# Patient Record
Sex: FEMALE | Race: WHITE | NOT HISPANIC OR LATINO
[De-identification: names, ages, dates, MRNs, and addresses within clinical notes are randomized per-mention and may not be internally consistent; named-entity substitution may affect disease eponyms.]

---

## 2017-03-28 ENCOUNTER — RECORD ABSTRACTING (OUTPATIENT)
Age: 22
End: 2017-03-28

## 2017-03-28 DIAGNOSIS — Z30.09 ENCOUNTER FOR OTHER GENERAL COUNSELING AND ADVICE ON CONTRACEPTION: ICD-10-CM

## 2017-03-28 DIAGNOSIS — Z82.49 FAMILY HISTORY OF ISCHEMIC HEART DISEASE AND OTHER DISEASES OF THE CIRCULATORY SYSTEM: ICD-10-CM

## 2017-03-28 DIAGNOSIS — J45.909 UNSPECIFIED ASTHMA, UNCOMPLICATED: ICD-10-CM

## 2017-03-28 DIAGNOSIS — N94.6 DYSMENORRHEA, UNSPECIFIED: ICD-10-CM

## 2017-03-28 DIAGNOSIS — Z30.41 ENCOUNTER FOR SURVEILLANCE OF CONTRACEPTIVE PILLS: ICD-10-CM

## 2017-03-28 DIAGNOSIS — Z71.7 HUMAN IMMUNODEFICIENCY VIRUS [HIV] COUNSELING: ICD-10-CM

## 2017-03-28 DIAGNOSIS — S62.102A FRACTURE OF UNSPECIFIED CARPAL BONE, LEFT WRIST, INITIAL ENCOUNTER FOR CLOSED FRACTURE: ICD-10-CM

## 2017-03-28 DIAGNOSIS — Z30.9 ENCOUNTER FOR CONTRACEPTIVE MANAGEMENT, UNSPECIFIED: ICD-10-CM

## 2017-03-28 DIAGNOSIS — Z78.9 OTHER SPECIFIED HEALTH STATUS: ICD-10-CM

## 2017-03-28 DIAGNOSIS — Z81.1 FAMILY HISTORY OF ALCOHOL ABUSE AND DEPENDENCE: ICD-10-CM

## 2017-03-28 DIAGNOSIS — Z80.9 FAMILY HISTORY OF MALIGNANT NEOPLASM, UNSPECIFIED: ICD-10-CM

## 2017-03-28 PROBLEM — Z00.00 ENCOUNTER FOR PREVENTIVE HEALTH EXAMINATION: Status: ACTIVE | Noted: 2017-03-28

## 2017-03-29 ENCOUNTER — APPOINTMENT (EMERGENCY)
Dept: RADIOLOGY | Facility: HOSPITAL | Age: 22
End: 2017-03-29
Payer: COMMERCIAL

## 2017-03-29 ENCOUNTER — HOSPITAL ENCOUNTER (EMERGENCY)
Facility: HOSPITAL | Age: 22
Discharge: HOME | End: 2017-03-29
Attending: EMERGENCY MEDICINE | Admitting: EMERGENCY MEDICINE
Payer: COMMERCIAL

## 2017-03-29 VITALS
BODY MASS INDEX: 20.41 KG/M2 | DIASTOLIC BLOOD PRESSURE: 71 MMHG | HEIGHT: 66 IN | WEIGHT: 127 LBS | HEART RATE: 92 BPM | TEMPERATURE: 98.8 F | RESPIRATION RATE: 18 BRPM | OXYGEN SATURATION: 100 % | SYSTOLIC BLOOD PRESSURE: 132 MMHG

## 2017-03-29 DIAGNOSIS — M54.6 THORACIC BACK PAIN: ICD-10-CM

## 2017-03-29 DIAGNOSIS — M54.50 ACUTE RIGHT-SIDED LOW BACK PAIN WITHOUT SCIATICA: Primary | ICD-10-CM

## 2017-03-29 PROCEDURE — 96372 THER/PROPH/DIAG INJ SC/IM: CPT

## 2017-03-29 PROCEDURE — 72100 X-RAY EXAM L-S SPINE 2/3 VWS: CPT

## 2017-03-29 PROCEDURE — 99283 EMERGENCY DEPT VISIT LOW MDM: CPT

## 2017-03-29 PROCEDURE — 72070 X-RAY EXAM THORAC SPINE 2VWS: CPT

## 2017-03-29 RX ORDER — IBUPROFEN 400 MG/1
400 TABLET ORAL EVERY 6 HOURS PRN
Qty: 30 TABLET | Refills: 0 | Status: SHIPPED | OUTPATIENT
Start: 2017-03-29 | End: 2017-06-05

## 2017-03-29 RX ORDER — LIDOCAINE 50 MG/G
2 PATCH TOPICAL ONCE
Status: DISCONTINUED | OUTPATIENT
Start: 2017-03-29 | End: 2017-03-29 | Stop reason: HOSPADM

## 2017-03-29 RX ORDER — METHOCARBAMOL 500 MG/1
500 TABLET, FILM COATED ORAL 3 TIMES DAILY
Qty: 30 TABLET | Refills: 0 | Status: SHIPPED | OUTPATIENT
Start: 2017-03-29 | End: 2018-03-31

## 2017-03-29 RX ORDER — METHOCARBAMOL 500 MG/1
500 TABLET, FILM COATED ORAL ONCE
Status: COMPLETED | OUTPATIENT
Start: 2017-03-29 | End: 2017-03-29

## 2017-03-29 RX ORDER — ACETAMINOPHEN 325 MG/1
975 TABLET ORAL ONCE
Status: COMPLETED | OUTPATIENT
Start: 2017-03-29 | End: 2017-03-29

## 2017-03-29 RX ORDER — KETOROLAC TROMETHAMINE 30 MG/ML
15 INJECTION, SOLUTION INTRAMUSCULAR; INTRAVENOUS ONCE
Status: COMPLETED | OUTPATIENT
Start: 2017-03-29 | End: 2017-03-29

## 2017-03-29 RX ORDER — LIDOCAINE 50 MG/G
1 PATCH TOPICAL EVERY 24 HOURS
Qty: 10 PATCH | Refills: 0 | Status: SHIPPED | OUTPATIENT
Start: 2017-03-29 | End: 2017-06-05

## 2017-03-29 RX ADMIN — KETOROLAC TROMETHAMINE 15 MG: 30 INJECTION, SOLUTION INTRAMUSCULAR at 15:36

## 2017-03-29 RX ADMIN — LIDOCAINE 2 PATCH: 50 PATCH CUTANEOUS at 15:37

## 2017-03-29 RX ADMIN — ACETAMINOPHEN 975 MG: 325 TABLET, FILM COATED ORAL at 15:36

## 2017-03-29 RX ADMIN — METHOCARBAMOL 500 MG: 500 TABLET ORAL at 15:36

## 2017-06-05 ENCOUNTER — HOSPITAL ENCOUNTER (EMERGENCY)
Facility: HOSPITAL | Age: 22
Discharge: HOME | End: 2017-06-05
Attending: EMERGENCY MEDICINE
Payer: COMMERCIAL

## 2017-06-05 ENCOUNTER — APPOINTMENT (EMERGENCY)
Dept: RADIOLOGY | Facility: HOSPITAL | Age: 22
End: 2017-06-05
Payer: COMMERCIAL

## 2017-06-05 VITALS
TEMPERATURE: 97.9 F | OXYGEN SATURATION: 99 % | BODY MASS INDEX: 20.09 KG/M2 | HEIGHT: 66 IN | DIASTOLIC BLOOD PRESSURE: 67 MMHG | WEIGHT: 125 LBS | SYSTOLIC BLOOD PRESSURE: 115 MMHG | RESPIRATION RATE: 18 BRPM | HEART RATE: 66 BPM
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
## 2017-06-05 DIAGNOSIS — R51.9 HEADACHE: ICD-10-CM

## 2017-06-05 DIAGNOSIS — M54.6 THORACOLUMBAR BACK PAIN: ICD-10-CM

## 2017-06-05 DIAGNOSIS — M54.2 NECK PAIN: ICD-10-CM

## 2017-06-05 DIAGNOSIS — R10.9 ABDOMINAL PAIN: ICD-10-CM

## 2017-06-05 DIAGNOSIS — M54.50 THORACOLUMBAR BACK PAIN: ICD-10-CM

## 2017-06-05 LAB
ANION GAP BLD CALC-SCNC: 15 MMOL/L (ref 4–13)
BILIRUB UR QL STRIP: NEGATIVE
BUN BLD-MCNC: 14 MG/DL (ref 5–25)
CA-I BLD-SCNC: 1.14 MMOL/L (ref 1.12–1.32)
CHLORIDE BLD-SCNC: 103 MMOL/L (ref 100–108)
CLARITY UR: CLEAR
COLOR UR: YELLOW
CREAT BLD-MCNC: 0.7 MG/DL (ref 0.6–1.3)
GFR SERPL CREATININE-BSD FRML MDRD: >60 ML/MIN/1.73SQ M
GLUCOSE SERPL-MCNC: 97 MG/DL (ref 65–140)
GLUCOSE UR STRIP-MCNC: NEGATIVE MG/DL
HCG UR QL: NORMAL
HCT VFR BLD CALC: 42 % (ref 34.8–46.1)
HGB BLDA-MCNC: 14.3 G/DL (ref 11.5–15.4)
HGB UR QL STRIP.AUTO: NEGATIVE
KETONES UR STRIP-MCNC: NEGATIVE MG/DL
LEUKOCYTE ESTERASE UR QL STRIP: NEGATIVE
NITRITE UR QL STRIP: NEGATIVE
PCO2 BLD: 28 MMOL/L (ref 21–32)
PH UR STRIP.AUTO: 6 [PH] (ref 4.5–8)
POTASSIUM BLD-SCNC: 4.7 MMOL/L (ref 3.5–5.3)
PROT UR STRIP-MCNC: NEGATIVE MG/DL
SODIUM BLD-SCNC: 140 MMOL/L (ref 136–145)
SP GR UR STRIP.AUTO: 1.01 (ref 1–1.03)
SPECIMEN SOURCE: ABNORMAL
UROBILINOGEN UR QL STRIP.AUTO: 0.2 E.U./DL

## 2017-06-05 PROCEDURE — 99284 EMERGENCY DEPT VISIT MOD MDM: CPT

## 2017-06-05 PROCEDURE — 72125 CT NECK SPINE W/O DYE: CPT

## 2017-06-05 PROCEDURE — 71250 CT THORAX DX C-: CPT

## 2017-06-05 PROCEDURE — 85014 HEMATOCRIT: CPT

## 2017-06-05 PROCEDURE — 96374 THER/PROPH/DIAG INJ IV PUSH: CPT

## 2017-06-05 PROCEDURE — 81025 URINE PREGNANCY TEST: CPT | Performed by: EMERGENCY MEDICINE

## 2017-06-05 PROCEDURE — 74176 CT ABD & PELVIS W/O CONTRAST: CPT

## 2017-06-05 PROCEDURE — 81003 URINALYSIS AUTO W/O SCOPE: CPT

## 2017-06-05 PROCEDURE — 70450 CT HEAD/BRAIN W/O DYE: CPT

## 2017-06-05 PROCEDURE — 80047 BASIC METABLC PNL IONIZED CA: CPT

## 2017-06-05 RX ORDER — OMEPRAZOLE 40 MG/1
40 CAPSULE, DELAYED RELEASE ORAL DAILY
COMMUNITY
End: 2020-01-07 | Stop reason: ALTCHOICE

## 2017-06-05 RX ORDER — ACETAMINOPHEN 325 MG/1
975 TABLET ORAL ONCE
Status: DISCONTINUED | OUTPATIENT
Start: 2017-06-05 | End: 2017-06-06 | Stop reason: HOSPADM

## 2017-06-05 RX ORDER — KETOROLAC TROMETHAMINE 30 MG/ML
15 INJECTION, SOLUTION INTRAMUSCULAR; INTRAVENOUS ONCE
Status: COMPLETED | OUTPATIENT
Start: 2017-06-05 | End: 2017-06-05

## 2017-06-05 RX ORDER — TIZANIDINE 4 MG/1
4 TABLET ORAL
COMMUNITY
End: 2018-03-31

## 2017-06-05 RX ORDER — OXYCODONE HYDROCHLORIDE AND ACETAMINOPHEN 5; 325 MG/1; MG/1
0.25 TABLET ORAL AS NEEDED
COMMUNITY

## 2017-06-05 RX ADMIN — KETOROLAC TROMETHAMINE 15 MG: 30 INJECTION, SOLUTION INTRAMUSCULAR at 19:09

## 2017-12-03 ENCOUNTER — EMERGENCY (EMERGENCY)
Facility: HOSPITAL | Age: 22
LOS: 0 days | Discharge: HOME | End: 2017-12-03
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
## 2017-12-03 DIAGNOSIS — M79.642 PAIN IN LEFT HAND: ICD-10-CM
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
## 2018-03-31 ENCOUNTER — APPOINTMENT (EMERGENCY)
Dept: RADIOLOGY | Facility: HOSPITAL | Age: 23
End: 2018-03-31
Payer: COMMERCIAL

## 2018-03-31 ENCOUNTER — HOSPITAL ENCOUNTER (EMERGENCY)
Facility: HOSPITAL | Age: 23
Discharge: HOME | End: 2018-04-01
Attending: EMERGENCY MEDICINE
Payer: COMMERCIAL

## 2018-03-31 DIAGNOSIS — N39.0 URINARY TRACT INFECTION: Primary | ICD-10-CM

## 2018-03-31 LAB
BACTERIA UR QL AUTO: ABNORMAL /HPF
BASOPHILS # BLD AUTO: 0.02 THOUSANDS/ΜL (ref 0–0.1)
BASOPHILS NFR BLD AUTO: 0 % (ref 0–1)
BILIRUB UR QL STRIP: NEGATIVE
CLARITY UR: CLEAR
COLOR UR: YELLOW
COLOR, POC: YELLOW
EOSINOPHIL # BLD AUTO: 0.18 THOUSAND/ΜL (ref 0–0.61)
EOSINOPHIL NFR BLD AUTO: 2 % (ref 0–6)
ERYTHROCYTE [DISTWIDTH] IN BLOOD BY AUTOMATED COUNT: 12.9 % (ref 11.6–15.1)
EXT PREG TEST URINE: NEGATIVE
GLUCOSE UR STRIP-MCNC: NEGATIVE MG/DL
HCT VFR BLD AUTO: 41.8 % (ref 34.8–46.1)
HGB BLD-MCNC: 14.5 G/DL (ref 11.5–15.4)
HGB UR QL STRIP.AUTO: ABNORMAL
HYALINE CASTS #/AREA URNS LPF: ABNORMAL /LPF
KETONES UR STRIP-MCNC: NEGATIVE MG/DL
LEUKOCYTE ESTERASE UR QL STRIP: ABNORMAL
LYMPHOCYTES # BLD AUTO: 3.34 THOUSANDS/ΜL (ref 0.6–4.47)
LYMPHOCYTES NFR BLD AUTO: 46 % (ref 14–44)
MCH RBC QN AUTO: 32 PG (ref 26.8–34.3)
MCHC RBC AUTO-ENTMCNC: 34.7 G/DL (ref 31.4–37.4)
MCV RBC AUTO: 92 FL (ref 82–98)
MONOCYTES # BLD AUTO: 0.66 THOUSAND/ΜL (ref 0.17–1.22)
MONOCYTES NFR BLD AUTO: 9 % (ref 4–12)
NEUTROPHILS # BLD AUTO: 3.17 THOUSANDS/ΜL (ref 1.85–7.62)
NEUTS SEG NFR BLD AUTO: 43 % (ref 43–75)
NITRITE UR QL STRIP: NEGATIVE
NON-SQ EPI CELLS URNS QL MICRO: ABNORMAL /HPF
NRBC BLD AUTO-RTO: 0 /100 WBCS
PH UR STRIP.AUTO: 7.5 [PH] (ref 4.5–8)
PLATELET # BLD AUTO: 189 THOUSANDS/UL (ref 149–390)
PMV BLD AUTO: 11.2 FL (ref 8.9–12.7)
PROT UR STRIP-MCNC: ABNORMAL MG/DL
RBC # BLD AUTO: 4.53 MILLION/UL (ref 3.81–5.12)
RBC #/AREA URNS AUTO: ABNORMAL /HPF
SP GR UR STRIP.AUTO: 1.01 (ref 1–1.03)
UROBILINOGEN UR QL STRIP.AUTO: 2 E.U./DL
WBC # BLD AUTO: 7.38 THOUSAND/UL (ref 4.31–10.16)
WBC #/AREA URNS AUTO: ABNORMAL /HPF

## 2018-03-31 PROCEDURE — 81002 URINALYSIS NONAUTO W/O SCOPE: CPT | Performed by: EMERGENCY MEDICINE

## 2018-03-31 PROCEDURE — 83690 ASSAY OF LIPASE: CPT | Performed by: EMERGENCY MEDICINE

## 2018-03-31 PROCEDURE — 81025 URINE PREGNANCY TEST: CPT | Performed by: EMERGENCY MEDICINE

## 2018-03-31 PROCEDURE — 81001 URINALYSIS AUTO W/SCOPE: CPT

## 2018-03-31 PROCEDURE — 80053 COMPREHEN METABOLIC PANEL: CPT | Performed by: EMERGENCY MEDICINE

## 2018-03-31 PROCEDURE — 96365 THER/PROPH/DIAG IV INF INIT: CPT

## 2018-03-31 PROCEDURE — 96375 TX/PRO/DX INJ NEW DRUG ADDON: CPT

## 2018-03-31 PROCEDURE — 85025 COMPLETE CBC W/AUTO DIFF WBC: CPT | Performed by: EMERGENCY MEDICINE

## 2018-03-31 PROCEDURE — 36415 COLL VENOUS BLD VENIPUNCTURE: CPT | Performed by: EMERGENCY MEDICINE

## 2018-03-31 PROCEDURE — 74176 CT ABD & PELVIS W/O CONTRAST: CPT

## 2018-03-31 RX ORDER — KETOROLAC TROMETHAMINE 30 MG/ML
15 INJECTION, SOLUTION INTRAMUSCULAR; INTRAVENOUS ONCE
Status: COMPLETED | OUTPATIENT
Start: 2018-03-31 | End: 2018-03-31

## 2018-03-31 RX ADMIN — KETOROLAC TROMETHAMINE 15 MG: 30 INJECTION, SOLUTION INTRAMUSCULAR at 23:38

## 2018-04-01 VITALS
SYSTOLIC BLOOD PRESSURE: 109 MMHG | OXYGEN SATURATION: 99 % | WEIGHT: 130 LBS | HEART RATE: 64 BPM | RESPIRATION RATE: 16 BRPM | BODY MASS INDEX: 20.98 KG/M2 | TEMPERATURE: 98.6 F | DIASTOLIC BLOOD PRESSURE: 61 MMHG

## 2018-04-01 LAB
ALBUMIN SERPL BCP-MCNC: 3.9 G/DL (ref 3.5–5)
ALP SERPL-CCNC: 38 U/L (ref 46–116)
ALT SERPL W P-5'-P-CCNC: 19 U/L (ref 12–78)
ANION GAP SERPL CALCULATED.3IONS-SCNC: 4 MMOL/L (ref 4–13)
AST SERPL W P-5'-P-CCNC: 16 U/L (ref 5–45)
BILIRUB SERPL-MCNC: 0.46 MG/DL (ref 0.2–1)
BUN SERPL-MCNC: 11 MG/DL (ref 5–25)
CALCIUM SERPL-MCNC: 8.6 MG/DL (ref 8.3–10.1)
CHLORIDE SERPL-SCNC: 108 MMOL/L (ref 100–108)
CO2 SERPL-SCNC: 28 MMOL/L (ref 21–32)
CREAT SERPL-MCNC: 1.03 MG/DL (ref 0.6–1.3)
GFR SERPL CREATININE-BSD FRML MDRD: 77 ML/MIN/1.73SQ M
GLUCOSE SERPL-MCNC: 86 MG/DL (ref 65–140)
LIPASE SERPL-CCNC: 81 U/L (ref 73–393)
POTASSIUM SERPL-SCNC: 3.6 MMOL/L (ref 3.5–5.3)
PROT SERPL-MCNC: 7.1 G/DL (ref 6.4–8.2)
SODIUM SERPL-SCNC: 140 MMOL/L (ref 136–145)

## 2018-04-01 PROCEDURE — 99284 EMERGENCY DEPT VISIT MOD MDM: CPT

## 2018-04-01 RX ORDER — NAPROXEN 375 MG/1
375 TABLET ORAL 2 TIMES DAILY WITH MEALS
Qty: 20 TABLET | Refills: 0 | Status: SHIPPED | OUTPATIENT
Start: 2018-04-01 | End: 2018-10-16

## 2018-04-01 RX ORDER — NAPROXEN 375 MG/1
375 TABLET ORAL 2 TIMES DAILY WITH MEALS
Qty: 20 TABLET | Refills: 0 | Status: SHIPPED | OUTPATIENT
Start: 2018-04-01 | End: 2018-04-01

## 2018-04-01 RX ORDER — CEPHALEXIN 500 MG/1
500 CAPSULE ORAL EVERY 8 HOURS SCHEDULED
Qty: 21 CAPSULE | Refills: 0 | Status: SHIPPED | OUTPATIENT
Start: 2018-04-01 | End: 2018-04-01

## 2018-04-01 RX ORDER — CEPHALEXIN 500 MG/1
500 CAPSULE ORAL EVERY 8 HOURS SCHEDULED
Qty: 21 CAPSULE | Refills: 0 | Status: SHIPPED | OUTPATIENT
Start: 2018-04-01 | End: 2018-04-08

## 2018-04-01 RX ADMIN — CEFTRIAXONE 1000 MG: 1 INJECTION, SOLUTION INTRAVENOUS at 01:05

## 2018-04-01 NOTE — ED ATTENDING ATTESTATION
I, [address] [place] [address], DO, saw and evaluated the patient  I have discussed the patient with the resident/non-physician practitioner and agree with the resident's/non-physician practitioner's findings, Plan of Care, and MDM as documented in the resident's/non-physician practitioner's note, except where noted  All available labs and Radiology studies were reviewed  At this point I agree with the current assessment done in the Emergency Department  I have conducted an independent evaluation of this patient a history and physical is as follows:    21yo female presents with abd pain  Pt states it started suddenly while at work  Pain is in lower left side of abdomen and into her back  Denies dysuria and denies blood in her urine  No fevers  On exam - nad, heart reg, lungs clear, abd soft with tenderness left lower abd and left CVA tenderness    Plan - check urine, labs, CT    Critical Care Time  CritCare Time    Procedures

## 2018-04-01 NOTE — DISCHARGE INSTRUCTIONS

## 2018-04-01 NOTE — ED PROVIDER NOTES
History  Chief Complaint   Patient presents with    Abdominal Pain     Pt has lower left sided abdominal pain that radiates to her back  Pt states its 'burning my spine " Pt denies NVD  HPI     26year old female p/w LLQ and flank pain  Started 10 hours ago  Worsening  Aching  Radiates to flank and whole left side of abdomen  denies assoc dysuria, hematuria, cp, sob  No vag bleeding or discharge  No pmhx  A/P: left sided pain, cva tenderness on exam  Will check ct for stsone, urine for infection  Labs  Prior to Admission Medications   Prescriptions Last Dose Informant Patient Reported? Taking? Norgestrel-Ethinyl Estradiol (CRYSELLE-28 PO)   Yes Yes   Sig: Take 1 tablet by mouth daily  etanercept (ENBREL) 50 mg/mL SOSY   Yes Yes   Sig: Inject under the skin once a week   omeprazole (PriLOSEC) 40 MG capsule   Yes No   Sig: Take 40 mg by mouth daily   oxyCODONE-acetaminophen (PERCOCET) 5-325 mg per tablet   Yes Yes   Sig: Take 1 tablet by mouth every 4 (four) hours as needed for moderate pain      Facility-Administered Medications: None       Past Medical History:   Diagnosis Date    GERD (gastroesophageal reflux disease)     Irritable bowel syndrome     Psoriasis        History reviewed  No pertinent surgical history  History reviewed  No pertinent family history  I have reviewed and agree with the history as documented  Social History   Substance Use Topics    Smoking status: Current Every Day Smoker     Packs/day: 0 25     Types: Cigarettes    Smokeless tobacco: Never Used    Alcohol use No        Review of Systems   Constitutional: Negative for diaphoresis, fatigue and fever  HENT: Negative for facial swelling and nosebleeds  Eyes: Negative for pain and visual disturbance  Respiratory: Negative for apnea, cough, shortness of breath and wheezing  Cardiovascular: Negative for chest pain and leg swelling     Gastrointestinal: Negative for abdominal distention, abdominal pain, anal bleeding, blood in stool, nausea, rectal pain and vomiting  Genitourinary: Negative for difficulty urinating, dysuria and flank pain  Musculoskeletal: Negative for back pain, neck pain and neck stiffness  Neurological: Negative for dizziness, syncope, weakness, light-headedness and headaches  All other systems reviewed and are negative  Physical Exam  ED Triage Vitals   Temperature Pulse Respirations Blood Pressure SpO2   04/01/18 0115 03/31/18 2235 03/31/18 2235 03/31/18 2235 03/31/18 2235   98 6 °F (37 °C) 100 18 147/94 100 %      Temp Source Heart Rate Source Patient Position - Orthostatic VS BP Location FiO2 (%)   04/01/18 0115 -- -- -- --   Oral          Pain Score       03/31/18 2235       9           Orthostatic Vital Signs  Vitals:    03/31/18 2235 04/01/18 0115   BP: 147/94 109/61   Pulse: 100 64       Physical Exam   Constitutional: She is oriented to person, place, and time  She appears well-developed and well-nourished  No distress  HENT:   Head: Normocephalic and atraumatic  Nose: Nose normal    Eyes: Conjunctivae and EOM are normal  Pupils are equal, round, and reactive to light  No scleral icterus  Neck: Normal range of motion  Neck supple  No JVD present  No tracheal deviation present  No thyromegaly present  Cardiovascular: Normal rate, regular rhythm, normal heart sounds and intact distal pulses  Exam reveals no gallop and no friction rub  Pulmonary/Chest: Effort normal and breath sounds normal  No respiratory distress  She has no wheezes  She has no rales  She exhibits no tenderness  Abdominal: Soft  Bowel sounds are normal  She exhibits no distension and no mass  There is tenderness (left cva, llq )  There is no rebound and no guarding  No hernia  Musculoskeletal: Normal range of motion  She exhibits no edema, tenderness or deformity  Neurological: She is alert and oriented to person, place, and time  She has normal reflexes  No cranial nerve deficit  Coordination normal    Skin: Skin is warm and dry  She is not diaphoretic  No erythema  Psychiatric: She has a normal mood and affect  Her behavior is normal    Nursing note and vitals reviewed  ED Medications  Medications   ketorolac (TORADOL) injection 15 mg (15 mg Intravenous Given 3/31/18 2338)   cefTRIAXone (ROCEPHIN) IVPB (premix) 1,000 mg (0 mg Intravenous Stopped 4/1/18 0135)       Diagnostic Studies  Results Reviewed     Procedure Component Value Units Date/Time    Comprehensive metabolic panel [[id number]]  (Abnormal) Collected:  03/31/18 2339    Lab Status:  Final result Specimen:  Blood from Arm, Left Updated:  04/01/18 0011     Sodium 140 mmol/L      Potassium 3 6 mmol/L      Chloride 108 mmol/L      CO2 28 mmol/L      Anion Gap 4 mmol/L      BUN 11 mg/dL      Creatinine 1 03 mg/dL      Glucose 86 mg/dL      Calcium 8 6 mg/dL      AST 16 U/L      ALT 19 U/L      Alkaline Phosphatase 38 (L) U/L      Total Protein 7 1 g/dL      Albumin 3 9 g/dL      Total Bilirubin 0 46 mg/dL      eGFR 77 ml/min/1 73sq m     Narrative:         National Kidney Disease Education Program recommendations are as follows:  GFR calculation is accurate only with a steady state creatinine  Chronic Kidney disease less than 60 ml/min/1 73 sq  meters  Kidney failure less than 15 ml/min/1 73 sq  meters      Lipase [[id number]]  (Normal) Collected:  03/31/18 2339    Lab Status:  Final result Specimen:  Blood from Arm, Left Updated:  04/01/18 0011     Lipase 81 u/L     Urine Microscopic [[id number]]  (Abnormal) Collected:  03/31/18 2349    Lab Status:  Final result Specimen:  Urine from Urine, Clean Catch Updated:  03/31/18 2359     RBC, UA None Seen /hpf      WBC, UA 4-10 (A) /hpf      Epithelial Cells None Seen /hpf      Bacteria, UA Occasional /hpf      Hyaline Casts, UA None Seen /lpf     CBC and differential [[id number]]  (Abnormal) Collected:  03/31/18 2339    Lab Status:  Final result Specimen:  Blood from Arm, Left Updated: 03/31/18 2358     WBC 7 38 Thousand/uL      RBC 4 53 Million/uL      Hemoglobin 14 5 g/dL      Hematocrit 41 8 %      MCV 92 fL      MCH 32 0 pg      MCHC 34 7 g/dL      RDW 12 9 %      MPV 11 2 fL      Platelets 057 Thousands/uL      nRBC 0 /100 WBCs      Neutrophils Relative 43 %      Lymphocytes Relative 46 (H) %      Monocytes Relative 9 %      Eosinophils Relative 2 %      Basophils Relative 0 %      Neutrophils Absolute 3 17 Thousands/µL      Lymphocytes Absolute 3 34 Thousands/µL      Monocytes Absolute 0 66 Thousand/µL      Eosinophils Absolute 0 18 Thousand/µL      Basophils Absolute 0 02 Thousands/µL     POCT pregnancy, urine [[id number]]  (Normal) Resulted:  03/31/18 2349    Lab Status:  Final result Updated:  03/31/18 2350     EXT PREG TEST UR (Ref: Negative) negative    POCT urinalysis dipstick [[id number]]  (Normal) Resulted:  03/31/18 2349    Lab Status:  Final result Specimen:  Urine Updated:  03/31/18 2349     Color, UA yellow    ED Urine Macroscopic [[id number]]  (Abnormal) Collected:  03/31/18 2349    Lab Status:  Final result Specimen:  Urine Updated:  03/31/18 2348     Color, UA Yellow     Clarity, UA Clear     pH, UA 7 5     Leukocytes, UA Trace (A)     Nitrite, UA Negative     Protein, UA 30 (1+) (A) mg/dl      Glucose, UA Negative mg/dl      Ketones, UA Negative mg/dl      Urobilinogen, UA 2 0 (A) E U /dl      Bilirubin, UA Negative     Blood, UA Moderate (A)     Specific [place], UA 1 015    Narrative:       CLINITEK RESULT                 CT abdomen pelvis wo contrast   Final Result by Gayathri Bartholomew MD (04/01 0035)         No renal stones  Normal appendix  No bowel obstruction  Consider contrast examining the appropriate clinical setting              Workstation performed: [id number]               Procedures  Procedures      Phone Consults  ED Phone Contact    ED Course  ED Course                                MDM  Number of Diagnoses or Management Options  Urinary tract infection: new and requires workup  Diagnosis management comments: Ct negative, patient feels better, will tx empirically for uti  Amount and/or Complexity of Data Reviewed  Clinical lab tests: reviewed and ordered  Tests in the radiology section of CPT®: reviewed and ordered  Tests in the medicine section of CPT®: reviewed and ordered      CritCare Time    Disposition  Final diagnoses:   Urinary tract infection     Time reflects when diagnosis was documented in both MDM as applicable and the Disposition within this note     Time User Action Codes Description Comment    4/1/2018  1:39 AM Ricarda Silva Add [N39 0] Urinary tract infection       ED Disposition     ED Disposition Condition Comment    Discharge  Lorenza Teresa discharge to home/self care  Condition at discharge: Good        Follow-up Information     Follow up With Specialties Details Why [address] [facility], DO Internal Medicine Schedule an appointment as soon as possible for a visit  [place] [id number]  [phone]          Discharge Medication List as of 4/1/2018  1:58 AM      START taking these medications    Details   cephalexin (KEFLEX) 500 mg capsule Take 1 capsule (500 mg total) by mouth every 8 (eight) hours for 7 days, Starting Sun 4/1/2018, Until Sun 4/8/2018, Print      naproxen (NAPROSYN) 375 mg tablet Take 1 tablet (375 mg total) by mouth 2 (two) times a day with meals for 20 doses, Starting Sun 4/1/2018, Until Wed 4/11/2018, Print         CONTINUE these medications which have NOT CHANGED    Details   etanercept (ENBREL) 50 mg/mL SOSY Inject under the skin once a week, Historical Med      Norgestrel-Ethinyl Estradiol (CRYSELLE-28 PO) Take 1 tablet by mouth daily  , Until Discontinued, Historical Med      oxyCODONE-acetaminophen (PERCOCET) 5-325 mg per tablet Take 1 tablet by mouth every 4 (four) hours as needed for moderate pain, Until Discontinued, Historical Med      omeprazole (PriLOSEC) 40 MG capsule Take 40 mg by mouth daily, Until Discontinued, Historical Med           No discharge procedures on file  ED Provider  Attending physically available and evaluated Gumaro Senior I managed the patient along with the ED Attending      Electronically Signed by         Stuart Leggett DO  04/03/18 9943

## 2018-05-21 ENCOUNTER — TELEPHONE (OUTPATIENT)
Dept: NEUROLOGY | Facility: CLINIC | Age: 23
End: 2018-05-21

## 2018-05-21 NOTE — TELEPHONE ENCOUNTER
Patient's provider, Dr Bon Mock called to see if we had a referral  We did so we called and spoke to Yeni in call center  She will be reaching out to patient to schedule an appointment

## 2018-10-15 ENCOUNTER — APPOINTMENT (EMERGENCY)
Dept: RADIOLOGY | Facility: HOSPITAL | Age: 23
End: 2018-10-15
Payer: COMMERCIAL

## 2018-10-15 ENCOUNTER — HOSPITAL ENCOUNTER (EMERGENCY)
Facility: HOSPITAL | Age: 23
Discharge: HOME | End: 2018-10-16
Attending: EMERGENCY MEDICINE
Payer: COMMERCIAL

## 2018-10-15 VITALS
OXYGEN SATURATION: 100 % | HEART RATE: 79 BPM | TEMPERATURE: 99.5 F | RESPIRATION RATE: 18 BRPM | BODY MASS INDEX: 20.99 KG/M2 | WEIGHT: 130.07 LBS | DIASTOLIC BLOOD PRESSURE: 72 MMHG | SYSTOLIC BLOOD PRESSURE: 119 MMHG

## 2018-10-15 DIAGNOSIS — K08.89 PAIN, DENTAL: Primary | ICD-10-CM

## 2018-10-15 DIAGNOSIS — K04.7 DENTAL ABSCESS: ICD-10-CM

## 2018-10-15 DIAGNOSIS — R50.9 FEVER: ICD-10-CM

## 2018-10-15 LAB
ANION GAP BLD CALC-SCNC: 17 MMOL/L (ref 4–13)
BUN BLD-MCNC: 9 MG/DL (ref 5–25)
CA-I BLD-SCNC: 1.17 MMOL/L (ref 1.12–1.32)
CHLORIDE BLD-SCNC: 102 MMOL/L (ref 100–108)
CREAT BLD-MCNC: 0.8 MG/DL (ref 0.6–1.3)
EXT PREG TEST URINE: NEGATIVE
GFR SERPL CREATININE-BSD FRML MDRD: 105 ML/MIN/1.73SQ M
GLUCOSE SERPL-MCNC: 87 MG/DL (ref 65–140)
HCT VFR BLD CALC: 47 % (ref 34.8–46.1)
HGB BLDA-MCNC: 16 G/DL (ref 11.5–15.4)
PCO2 BLD: 26 MMOL/L (ref 21–32)
POTASSIUM BLD-SCNC: 3.4 MMOL/L (ref 3.5–5.3)
SODIUM BLD-SCNC: 140 MMOL/L (ref 136–145)
SPECIMEN SOURCE: ABNORMAL

## 2018-10-15 PROCEDURE — 70491 CT SOFT TISSUE NECK W/DYE: CPT

## 2018-10-15 PROCEDURE — 96374 THER/PROPH/DIAG INJ IV PUSH: CPT

## 2018-10-15 PROCEDURE — 85014 HEMATOCRIT: CPT

## 2018-10-15 PROCEDURE — 99284 EMERGENCY DEPT VISIT MOD MDM: CPT

## 2018-10-15 PROCEDURE — 80047 BASIC METABLC PNL IONIZED CA: CPT

## 2018-10-15 PROCEDURE — 81025 URINE PREGNANCY TEST: CPT | Performed by: EMERGENCY MEDICINE

## 2018-10-15 PROCEDURE — 96375 TX/PRO/DX INJ NEW DRUG ADDON: CPT

## 2018-10-15 RX ORDER — HYDROMORPHONE HCL/PF 1 MG/ML
0.5 SYRINGE (ML) INJECTION ONCE
Status: COMPLETED | OUTPATIENT
Start: 2018-10-15 | End: 2018-10-15

## 2018-10-15 RX ORDER — AMOXICILLIN AND CLAVULANATE POTASSIUM 875; 125 MG/1; MG/1
1 TABLET, FILM COATED ORAL EVERY 12 HOURS
Qty: 14 TABLET | Refills: 0 | Status: SHIPPED | OUTPATIENT
Start: 2018-10-15 | End: 2018-10-22

## 2018-10-15 RX ORDER — AMOXICILLIN AND CLAVULANATE POTASSIUM 875; 125 MG/1; MG/1
1 TABLET, FILM COATED ORAL ONCE
Status: COMPLETED | OUTPATIENT
Start: 2018-10-16 | End: 2018-10-16

## 2018-10-15 RX ORDER — METHYLPREDNISOLONE SODIUM SUCCINATE 125 MG/2ML
125 INJECTION, POWDER, LYOPHILIZED, FOR SOLUTION INTRAMUSCULAR; INTRAVENOUS ONCE
Status: COMPLETED | OUTPATIENT
Start: 2018-10-15 | End: 2018-10-15

## 2018-10-15 RX ADMIN — IOHEXOL 100 ML: 350 INJECTION, SOLUTION INTRAVENOUS at 23:31

## 2018-10-15 RX ADMIN — METHYLPREDNISOLONE SODIUM SUCCINATE 125 MG: 125 INJECTION, POWDER, FOR SOLUTION INTRAMUSCULAR; INTRAVENOUS at 23:10

## 2018-10-15 RX ADMIN — HYDROMORPHONE HYDROCHLORIDE 0.5 MG: 1 INJECTION, SOLUTION INTRAMUSCULAR; INTRAVENOUS; SUBCUTANEOUS at 23:11

## 2018-10-16 RX ORDER — ZOLPIDEM TARTRATE 5 MG/1
5 TABLET ORAL
COMMUNITY
End: 2019-08-06 | Stop reason: SINTOL

## 2018-10-16 RX ORDER — DULOXETIN HYDROCHLORIDE 60 MG/1
20 CAPSULE, DELAYED RELEASE ORAL DAILY
COMMUNITY
End: 2019-07-15

## 2018-10-16 RX ADMIN — AMOXICILLIN AND CLAVULANATE POTASSIUM 1 TABLET: 875; 125 TABLET, FILM COATED ORAL at 00:05

## 2018-10-16 NOTE — DISCHARGE INSTRUCTIONS
Dental Abscess   WHAT YOU NEED TO KNOW:   A dental abscess is a collection of pus in or around a tooth  A dental abscess is caused by bacteria  The bacteria usually enter the tooth when the enamel (outer part of the tooth) is damaged by tooth decay  Bacteria may also enter the tooth through a break or chip in the tooth, or a cut in the gum  Food particles that are stuck between the teeth for a long time may also lead to an abscess  DISCHARGE INSTRUCTIONS:   Return to the emergency department if:   · You have severe pain  · You have trouble breathing because of pain or swelling  Contact your healthcare provider if:   · Your symptoms get worse, even after treatment  · Your mouth is bleeding  · You cannot eat or drink because of pain or swelling  · Your abscess returns  · You have an injury that causes a crack in your tooth  · You have questions or concerns about your condition or care  Medicines: You may  need any of the following:  · Antibiotics  help treat a bacterial infection  · NSAIDs , such as ibuprofen, help decrease swelling, pain, and fever  This medicine is available with or without a doctor's order  NSAIDs can cause stomach bleeding or kidney problems in certain people  If you take blood thinner medicine, always ask your healthcare provider if NSAIDs are safe for you  Always read the medicine label and follow directions  · Acetaminophen  decreases pain and fever  It is available without a doctor's order  Ask how much to take and how often to take it  Follow directions  Read the labels of all other medicines you are using to see if they also contain acetaminophen, or ask your doctor or pharmacist  Acetaminophen can cause liver damage if not taken correctly  Do not use more than 4 grams (4,000 milligrams) total of acetaminophen in one day  · Prescription pain medicine  may be given  Ask your healthcare provider how to take this medicine safely   Some prescription pain medicines contain acetaminophen  Do not take other medicines that contain acetaminophen without talking to your healthcare provider  Too much acetaminophen may cause liver damage  Prescription pain medicine may cause constipation  Ask your healthcare provider how to prevent or treat constipation  · Take your medicine as directed  Contact your healthcare provider if you think your medicine is not helping or if you have side effects  Tell him of her if you are allergic to any medicine  Keep a list of the medicines, vitamins, and herbs you take  Include the amounts, and when and why you take them  Bring the list or the pill bottles to follow-up visits  Carry your medicine list with you in case of an emergency  Self-care:   · Rinse your mouth every 2 hours with salt water  This will help keep the area clean  · Gently brush your teeth twice a day with a soft tooth brush  This will help keep the area clean  · Eat soft foods as directed  Soft foods may cause less pain  Examples include applesauce, yogurt, and cooked pasta  Ask your healthcare provider how long to follow this instruction  · Apply a warm compress to your tooth or gum  Use a cotton ball or gauze soaked in warm water  Remove the compress in 10 minutes or when it becomes cool  Repeat 3 times a day  Prevent another abscess:   · Brush your teeth at least 2 times a day with fluoride toothpaste  · Use dental floss to clean between your teeth at least once a day  · Rinse your mouth with water or mouthwash after meals and snacks  · Chew sugarless gum after meals and snacks  · Limit foods that are sticky and high in sugar such as raisons  Also limit drinks high in sugar, such as soda  · See your dentist every 6 months for dental cleanings and oral exams  Follow up with your healthcare provider in 24 hours: Your healthcare provider will need to check your teeth and gums   Write down your questions so you remember to ask them during your visits  © 2017 [employer] Information is for End User's use only and may not be sold, redistributed or otherwise used for commercial purposes  All illustrations and images included in CareNotes® are the copyrighted property of [employer]  or Mikael Traore  The above information is an  only  It is not intended as medical advice for individual conditions or treatments  Talk to your doctor, nurse or pharmacist before following any medical regimen to see if it is safe and effective for you

## 2018-10-16 NOTE — ED ATTENDING ATTESTATION
John London MD, saw and evaluated the patient  All available labs and X-rays were ordered by me or the resident and have been reviewed by myself  I discussed the patient with the resident / non-physician and agree with the resident's / non-physician practitioner's findings and plan as documented in the resident's / non-physician practicitioner's note, except where noted  At this point, I agree with the current assessment done in the ED  Chief Complaint   Patient presents with    Dental Pain     Pt c/o right sided dental pain that started 1 week ago  Pt sent from her dentist to rule out abscess  Pt reports fevers  This is a 79-year-old female presenting from [place] for evaluation of right-sided dental pain  She states that she has been noticing focal tenderness in the right upper aspect of her teeth, around to 4-6  It has been gradually getting worse, noticing some facial swelling  She had percussive tenderness of multiple teeth  Subjective fevers  Denies nausea or vomiting  Home medications not functioning so she came in for evaluation  No history of similar  She states that she has a history of asthma whenever she gets IV contrast   Denies any anaphylaxis  PE:  Vitals:    10/15/18 2019 10/15/18 2322   BP: 166/76 119/72   BP Location:  Right arm   Pulse: 104 79   Resp: 18 18   Temp: 99 5 °F (37 5 °C)    SpO2: 100% 100%   Weight: 59 kg (130 lb 1 1 oz)    General: VSS, NAD, awake, alert  Well-nourished, well-developed  Appears stated age  Speaking normally in full sentences  Head: Normocephalic, atraumatic, nontender  Eyes: PERRL, EOM-I  No diplopia  No hyphema  No subconjunctival hemorrhages  Symmetrical lids  ENT: Atraumatic external nose and ears  MMM  No malocclusion  No stridor  Normal phonation  No drooling  Normal swallowing  There is no fluctuance, there is percussive tenderness of teeth 4 5 and 6 without any obvious fractures or dental caries    She has very mild facial asymmetry noted  Uvula is midline  No tonsillar hypertrophy  No marked lymphadenopathy  Neck: Symmetric, trachea midline  No JVD  CV: RRR  +S1/S2  No murmurs or gallops  Peripheral pulses +2 throughout  No chest wall tenderness  Lungs:   Unlabored No retractions  CTAB, lungs sounds equal bilateral    No tachypnea  Abd: +BS, soft, NT/ND    MSK:   FROM   Back:   No rashes  Skin: Dry, intact  Neuro: AAOx3, GCS 15, CN II-XII grossly intact  Motor grossly intact  Psychiatric/Behavioral: Appropriate mood and affect   Exam: deferred  A:  - dental pain  P:  - imaging for abscess, but likely discharge home with dental pain management, follow up with dental clinic   - We will give tylenol/motrin for pain  - We will write for chlorhexidine to sterilize the area and prevent further worsening of infection or repeat infection   - Given the appearance, we will also do antibiotics:  [    ] PCN 500mg QID  [ x ] Amoxicillin 20-40mg/kg/day TID  [    ] Augmentin 20mg/kg BID  [    ] Clindamycin (PCN allergy or worsening infection after 72 hours of amoxicillin) 8-20mg/kg/day TID  [    ] Azithromycin (alternative to clindamycin) 5-12mg/kg Qday  [    ] Metronidazole (30mg/kg/day QID) + Amoxicillin if suspected resistant infection  - The patient will follow-up with her primary care or dentist for re-evaluation of her symptoms   - The patient has no red flags for Jacques's or serious dental infection at this juncture  The patient doesn't appear toxic, nor has rapid progression of symptoms  Patient isn't immunocompromised  Patient has no SOB nor trouble swallowing  Patient doesn't appear dehydrated nor demonstrates trismus on exam    - 13 point ROS was performed and all are normal unless stated in the history above  - Nursing note reviewed  Vitals reviewed  - Orders placed by myself and/or advanced practitioner / resident     - Previous chart was reviewed  - No language barrier    - History obtained from patient     - There are no limitations to the history obtained  - Critical care time: Not applicable for this patient  Final Diagnosis:  1  Pain, dental    2  Fever    3  Dental abscess         Medications   methylPREDNISolone sodium succinate (Solu-MEDROL) injection 125 mg (125 mg Intravenous Given 10/15/18 2310)   HYDROmorphone (DILAUDID) injection 0 5 mg (0 5 mg Intravenous Given 10/15/18 2311)   iohexol (OMNIPAQUE) 350 MG/ML injection (MULTI-DOSE) 100 mL (100 mL Intravenous Given 10/15/18 2331)   amoxicillin-clavulanate (AUGMENTIN) 875-125 mg per tablet 1 tablet (1 tablet Oral Given 10/16/18 0005)     CT soft tissue neck   Final Result      No discrete fluid collection to suggest drainable abscess           Workstation performed: [id number]           Orders Placed This Encounter   Procedures    CT soft tissue neck    POCT chem 8+    POCT pregnancy, urine     Labs Reviewed   POCT CHEM 8+ - Abnormal        Result Value Ref Range Status    SODIUM, I-STAT 140  136 - 145 mmol/l Final    Potassium, i-STAT 3 4 (*) 3 5 - 5 3 mmol/L Final    Chloride, istat 102  100 - 108 mmol/L Final    CO2, i-STAT 26  21 - 32 mmol/L Final    Anion Gap, Istat 17 (*) 4 - 13 mmol/L Final    Calcium, Ionized i-STAT 1 17  1 12 - 1 32 mmol/L Final    BUN, I-STAT 9  5 - 25 mg/dl Final    Creatinine, i-STAT 0 8  0 6 - 1 3 mg/dl Final    eGFR 105  ml/min/1 73sq m Final    Glucose, i-STAT 87  65 - 140 mg/dl Final    Hct, i-STAT 47 (*) 34 8 - 46 1 % Final    Hgb, i-STAT 16 0 (*) 11 5 - 15 4 g/dl Final    Specimen Type VENOUS   Final   POCT PREGNANCY, URINE - Normal    EXT PREG TEST UR (Ref: Negative) negative   Final     Time reflects when diagnosis was documented in both MDM as applicable and the Disposition within this note     Time User Action Codes Description Comment    10/15/2018 11:58 PM Mamadou Bird Add [K08 89] Pain, dental     10/15/2018 11:58 PM Bria Palomino Add [R50 9] Fever     10/16/2018 12:01 AM Bria Palomino Add [K04 7] Dental abscess       ED Disposition     ED Disposition Condition Comment    Discharge  Florencia Schilling discharge to home/self care  Condition at discharge: Good        Follow-up Information     Follow up With Specialties Details Why Contact Info Additional Information    Keira Sandoval DMD Oral Maxillofacial Surgery Schedule an appointment as soon as possible for a visit Dental abscess [address] [facility] Emergency Department Emergency Medicine  If symptoms worsen [address]  [phone]  ED, [address] [place], [place], [address]        Discharge Medication List as of 10/16/2018 12:03 AM      START taking these medications    Details   amoxicillin-clavulanate (AUGMENTIN) 875-125 mg per tablet Take 1 tablet by mouth every 12 (twelve) hours for 7 days, Starting Mon 10/15/2018, Until Mon 10/22/2018, Print         CONTINUE these medications which have NOT CHANGED    Details   Norgestrel-Ethinyl Estradiol (CRYSELLE-28 PO) Take 1 tablet by mouth daily  , Until Discontinued, Historical Med      omeprazole (PriLOSEC) 40 MG capsule Take 40 mg by mouth daily, Until Discontinued, Historical Med      oxyCODONE-acetaminophen (PERCOCET) 5-325 mg per tablet Take 1 tablet by mouth every 4 (four) hours as needed for moderate pain, Until Discontinued, Historical Med      etanercept (ENBREL) 50 mg/mL SOSY Inject under the skin once a week, Historical Med      naproxen (NAPROSYN) 375 mg tablet Take 1 tablet (375 mg total) by mouth 2 (two) times a day with meals for 20 doses, Starting Sun 4/1/2018, Until Wed 4/11/2018, Print           No discharge procedures on file  Prior to Admission Medications   Prescriptions Last Dose Informant Patient Reported? Taking?    Certolizumab Pegol (CIMZIA SC)   Yes Yes   Sig: Inject under the skin 2 syringes every 4 weeks   DULoxetine (CYMBALTA) 60 mg delayed release capsule   Yes Yes Sig: Take 20 mg by mouth daily   Norgestrel-Ethinyl Estradiol (CRYSELLE-28 PO)   Yes Yes   Sig: Take 1 tablet by mouth daily  omeprazole (PriLOSEC) 40 MG capsule   Yes Yes   Sig: Take 40 mg by mouth daily   oxyCODONE-acetaminophen (PERCOCET) 5-325 mg per tablet   Yes Yes   Sig: Take 1 tablet by mouth every 4 (four) hours as needed for moderate pain   zolpidem (AMBIEN) 5 mg tablet   Yes Yes   Sig: Take 5 mg by mouth daily at bedtime as needed for sleep      Facility-Administered Medications: None       Portions of the record may have been created with voice recognition software  Occasional wrong word or "sound a like" substitutions may have occurred due to the inherent limitations of voice recognition software  Read the chart carefully and recognize, using context, where substitutions have occurred      Electronically signed by:  Penny Melton

## 2018-10-16 NOTE — ED PROVIDER NOTES
History  Chief Complaint   Patient presents with    Dental Pain     Pt c/o right sided dental pain that started 1 week ago  Pt sent from her dentist to rule out abscess  Pt reports fevers  This is a 81-year-old female presenting emergency department for evaluation of dental pain that started 1 week ago  She reports that the pain started on the right side of her upper jaw and has recently started to spread across the right side of her face  She has pain with chewing  She admits to fevers and headache as well  She has been having nausea without any vomiting and diarrhea  She denies any difficulty swallowing or change in voice  She has a history poor dentition with multiple fillings in the past   She has been trying opioid pain medications and mouthwash for her pain without significant improvement  She called her dentist today who was unable to see her and referred her to the emergency department  She has an appointment with him at 10 o'clock tomorrow morning  Prior to Admission Medications   Prescriptions Last Dose Informant Patient Reported? Taking? Certolizumab Pegol (CIMZIA SC)   Yes Yes   Sig: Inject under the skin 2 syringes every 4 weeks   DULoxetine (CYMBALTA) 60 mg delayed release capsule   Yes Yes   Sig: Take 20 mg by mouth daily   Norgestrel-Ethinyl Estradiol (CRYSELLE-28 PO)   Yes Yes   Sig: Take 1 tablet by mouth daily  omeprazole (PriLOSEC) 40 MG capsule   Yes Yes   Sig: Take 40 mg by mouth daily   oxyCODONE-acetaminophen (PERCOCET) 5-325 mg per tablet   Yes Yes   Sig: Take 1 tablet by mouth every 4 (four) hours as needed for moderate pain   zolpidem (AMBIEN) 5 mg tablet   Yes Yes   Sig: Take 5 mg by mouth daily at bedtime as needed for sleep      Facility-Administered Medications: None       Past Medical History:   Diagnosis Date    GERD (gastroesophageal reflux disease)     Irritable bowel syndrome     Psoriasis        History reviewed   No pertinent surgical history  History reviewed  No pertinent family history  I have reviewed and agree with the history as documented  Social History   Substance Use Topics    Smoking status: Current Every Day Smoker     Packs/day: 0 25     Types: Cigarettes    Smokeless tobacco: Never Used    Alcohol use No        Review of Systems   Constitutional: Positive for chills and fever  HENT: Positive for dental problem  Negative for congestion, drooling, mouth sores, sore throat, trouble swallowing and voice change  Eyes: Negative for visual disturbance  Respiratory: Negative for cough and shortness of breath  Cardiovascular: Negative for chest pain and palpitations  Gastrointestinal: Positive for diarrhea and nausea  Negative for abdominal pain and vomiting  Genitourinary: Negative for difficulty urinating and dysuria  Musculoskeletal: Negative for myalgias  Skin: Negative for rash  Neurological: Negative for dizziness, weakness, light-headedness, numbness and headaches  Physical Exam  ED Triage Vitals   Temperature Pulse Respirations Blood Pressure SpO2   10/15/18 2019 10/15/18 2019 10/15/18 2019 10/15/18 2019 10/15/18 2019   99 5 °F (37 5 °C) 104 18 166/76 100 %      Temp src Heart Rate Source Patient Position - Orthostatic VS BP Location FiO2 (%)   -- 10/15/18 2322 10/15/18 2322 10/15/18 2322 --    Monitor Sitting Right arm       Pain Score       10/15/18 2019       Worst Possible Pain           Orthostatic Vital Signs  Vitals:    10/15/18 2019 10/15/18 2322   BP: 166/76 119/72   Pulse: 104 79   Patient Position - Orthostatic VS:  Sitting       Physical Exam   Constitutional: She is oriented to person, place, and time  She appears well-developed and well-nourished  No distress  HENT:   Head: Normocephalic and atraumatic  Right Ear: External ear normal    Left Ear: External ear normal    Nose: Nose normal    Mouth/Throat:       Tenderness to palpation over the entire right side of the face    Within the oropharynx there is no fluctuance noted along the gumline or within the buccal mucosa  The floor of the mouth is soft a nonfluctuant  There are no mucosal lesions identified on exam    Eyes: Pupils are equal, round, and reactive to light  Conjunctivae and EOM are normal    Neck: Normal range of motion  Neck supple  No tracheal deviation present  Cardiovascular: Normal rate, regular rhythm, normal heart sounds and intact distal pulses  Pulmonary/Chest: Effort normal and breath sounds normal  No stridor  No respiratory distress  Abdominal: Soft  Bowel sounds are normal  There is no tenderness  Musculoskeletal: Normal range of motion  Lymphadenopathy:     She has no cervical adenopathy  Neurological: She is alert and oriented to person, place, and time  No cranial nerve deficit  Skin: Skin is warm and dry  Capillary refill takes less than 2 seconds  Psychiatric: She has a normal mood and affect  Nursing note and vitals reviewed        ED Medications  Medications   methylPREDNISolone sodium succinate (Solu-MEDROL) injection 125 mg (125 mg Intravenous Given 10/15/18 2310)   HYDROmorphone (DILAUDID) injection 0 5 mg (0 5 mg Intravenous Given 10/15/18 2311)   iohexol (OMNIPAQUE) 350 MG/ML injection (MULTI-DOSE) 100 mL (100 mL Intravenous Given 10/15/18 2331)   amoxicillin-clavulanate (AUGMENTIN) 875-125 mg per tablet 1 tablet (1 tablet Oral Given 10/16/18 0005)       Diagnostic Studies  Results Reviewed     Procedure Component Value Units Date/Time    POCT Chem 8+ [[id number]]  (Abnormal) Collected:  10/15/18 2315    Lab Status:  Final result Updated:  10/15/18 2320     SODIUM, I-STAT 140 mmol/l      Potassium, i-STAT 3 4 (L) mmol/L      Chloride, istat 102 mmol/L      CO2, i-STAT 26 mmol/L      Anion Gap, Istat 17 (H) mmol/L      Calcium, Ionized i-STAT 1 17 mmol/L      BUN, I-STAT 9 mg/dl      Creatinine, i-STAT 0 8 mg/dl      eGFR 105 ml/min/1 73sq m      Glucose, i-STAT 87 mg/dl      Hct, i-STAT 47 (H) %      Hgb, i-STAT 16 0 (H) g/dl      Specimen Type VENOUS    POCT pregnancy, urine [[id number]]  (Normal) Resulted:  10/15/18 2314    Lab Status:  Final result Updated:  10/15/18 2314     EXT PREG TEST UR (Ref: Negative) negative                 CT soft tissue neck   Final Result by David Gaytan MD (10/15 2352)      No discrete fluid collection to suggest drainable abscess  Workstation performed: [id number]               Procedures  Procedures      Phone Consults  ED Phone Contact    ED Course  ED Course as of Oct 15 2347   Mon Oct 15, 2018   2156                                [facility]  Number of Diagnoses or Management Options  Dental abscess:   Fever:   Pain, dental:   Diagnosis management comments: 28-year-old female presented to the emergency department for evaluation of dental pain which is likely secondary to apical abscesses over multiple teeth  She has got fevers and chills and appears uncomfortable  A CT scan of the soft tissue of the face and neck was performed to identify any large drainable abscesses and was negative  She is treated in the emergency department with pain medication and discharged home with a prescription of Augmentin  She has a appointment with her dentist at 10:00 a m  Tomorrow morning  I gave her the phone number for [employer] for further management if her dentist her dental abscess  CritCare Time    Disposition  Final diagnoses:   Pain, dental   Fever   Dental abscess     Time reflects when diagnosis was documented in both MDM as applicable and the Disposition within this note     Time User Action Codes Description Comment    10/15/2018 11:58 PM Lashawn Muller Add [K08 89] Pain, dental     10/15/2018 11:58 PM Mg Palomino Add [R50 9] Fever     10/16/2018 12:01 AM Mg Palomino Add [K04 7] Dental abscess       ED Disposition     ED Disposition Condition Comment    Discharge  Sangeetha Abed discharge to home/self care      Condition at discharge: Good Follow-up Information     Follow up With Specialties Details Why Contact Info Additional Information    Jigar Buckley DMD Oral Maxillofacial Surgery Schedule an appointment as soon as possible for a visit Dental abscess [address] [facility] Emergency Department Emergency Medicine  If symptoms worsen [address]  [phone]  ED, [address] [facility], [place], [place], [address]          Discharge Medication List as of 10/16/2018 12:03 AM      START taking these medications    Details   amoxicillin-clavulanate (AUGMENTIN) 875-125 mg per tablet Take 1 tablet by mouth every 12 (twelve) hours for 7 days, Starting Mon 10/15/2018, Until Mon 10/22/2018, Print         CONTINUE these medications which have NOT CHANGED    Details   Norgestrel-Ethinyl Estradiol (CRYSELLE-28 PO) Take 1 tablet by mouth daily  , Until Discontinued, Historical Med      omeprazole (PriLOSEC) 40 MG capsule Take 40 mg by mouth daily, Until Discontinued, Historical Med      oxyCODONE-acetaminophen (PERCOCET) 5-325 mg per tablet Take 1 tablet by mouth every 4 (four) hours as needed for moderate pain, Until Discontinued, Historical Med      etanercept (ENBREL) 50 mg/mL SOSY Inject under the skin once a week, Historical Med      naproxen (NAPROSYN) 375 mg tablet Take 1 tablet (375 mg total) by mouth 2 (two) times a day with meals for 20 doses, Starting Sun 4/1/2018, Until Wed 4/11/2018, Print           No discharge procedures on file  ED Provider  Attending physically available and evaluated Christo Arora  ANDRE managed the patient along with the ED Attending      Electronically Signed by         Bharath Caro MD  10/16/18 2413

## 2019-01-09 ENCOUNTER — TELEPHONE (OUTPATIENT)
Dept: PSYCHIATRY | Facility: CLINIC | Age: 24
End: 2019-01-09

## 2019-01-09 NOTE — TELEPHONE ENCOUNTER
Behavorial Health Outpatient Intake Questions    Referred by: insurance  Check with provider before scheduling    Are there any developmental disabilities? No    Does the patient have hearing impairment? No    Does the patient have ICM or CTT? No    Taking injectable psychiatric medications? NoIf yes, patient can not be seen here  Has the patient ever seen or currently see a psychiatrist? No If yes who/when? Has the patient ever seen or currently see a therapist? Yes If yes who/when? 5 yrs ago, from [place]    How many visits did the pt have for previous psychiatric treatment?  History    Has the patient served in the [place] [address]? No    If yes, have you had combat services? No    Was the patient activated into federal active duty as a member of the national guard or reserve? No    Minor Child    Who has custody of the child? Is there a custody agreement? If there is a custody agreement remind parent that they must bring a copy to the first appt or they will not be seen  [facility]     Presenting Problem (in patient's words) depression and anxiety    Substance Abuse:No concerns of substance abuse are reported  Has the patient been seen here previously, either inpatient or outpatient? No outpatient    If seen as outpatient, what provider(s) did the patient see? A member of the patient's family has been in therapy here with none  ACCEPTED as a patient Yes Appointment Date: Ant Morelos 3/11/19 @ 1:00    Referred Elsewhere? No    Primary Care Physician: DO PACHECO Loaiza telephone number: [phone]    [address] [facility]  ----------------------------------------------------------------------------------------------------------------------    Insurance subscriber:     kashmir BAUER;     Address: Phone:                                   SSN:    Employer:     [employer]                                                 Address:  ----------------------------------------------------------------------------------------------------------------------    Primary Insurance:  ma                                                              Phone:    ID number:    ss#                                                Group number:  ----------------------------------------------------------------------------------------------------------------------    Secondary Insurance:                                                               Phone:    ID number:                                                   Group number:  ----------------------------------------------------------------------------------------------------------------------    Other insurance information:             _______________________________________

## 2019-02-26 ENCOUNTER — TELEPHONE (OUTPATIENT)
Dept: NEUROLOGY | Facility: CLINIC | Age: 24
End: 2019-02-26

## 2019-04-26 ENCOUNTER — OFFICE VISIT (OUTPATIENT)
Dept: BEHAVIORAL/MENTAL HEALTH CLINIC | Facility: CLINIC | Age: 24
End: 2019-04-26
Payer: COMMERCIAL

## 2019-04-26 DIAGNOSIS — F41.1 GAD (GENERALIZED ANXIETY DISORDER): Primary | ICD-10-CM

## 2019-04-26 DIAGNOSIS — F31.81 BIPOLAR II DISORDER (HCC): ICD-10-CM

## 2019-04-26 PROCEDURE — 90791 PSYCH DIAGNOSTIC EVALUATION: CPT | Performed by: SOCIAL WORKER

## 2019-05-07 ENCOUNTER — TRANSCRIBE ORDERS (OUTPATIENT)
Dept: NEUROLOGY | Facility: CLINIC | Age: 24
End: 2019-05-07

## 2019-05-07 DIAGNOSIS — R51.9 HEADACHE: Primary | ICD-10-CM

## 2019-05-08 ENCOUNTER — TELEPHONE (OUTPATIENT)
Dept: PSYCHIATRY | Facility: CLINIC | Age: 24
End: 2019-05-08

## 2019-06-24 ENCOUNTER — OFFICE VISIT (OUTPATIENT)
Dept: BEHAVIORAL/MENTAL HEALTH CLINIC | Facility: CLINIC | Age: 24
End: 2019-06-24
Payer: COMMERCIAL

## 2019-06-24 DIAGNOSIS — F43.10 PTSD (POST-TRAUMATIC STRESS DISORDER): ICD-10-CM

## 2019-06-24 DIAGNOSIS — F33.2 SEVERE EPISODE OF RECURRENT MAJOR DEPRESSIVE DISORDER, WITHOUT PSYCHOTIC FEATURES (HCC): Primary | ICD-10-CM

## 2019-06-24 DIAGNOSIS — F41.1 GAD (GENERALIZED ANXIETY DISORDER): ICD-10-CM

## 2019-06-24 PROCEDURE — 90834 PSYTX W PT 45 MINUTES: CPT | Performed by: SOCIAL WORKER

## 2019-07-01 ENCOUNTER — SOCIAL WORK (OUTPATIENT)
Dept: BEHAVIORAL/MENTAL HEALTH CLINIC | Facility: CLINIC | Age: 24
End: 2019-07-01

## 2019-07-01 DIAGNOSIS — F43.10 PTSD (POST-TRAUMATIC STRESS DISORDER): ICD-10-CM

## 2019-07-01 DIAGNOSIS — F33.2 SEVERE EPISODE OF RECURRENT MAJOR DEPRESSIVE DISORDER, WITHOUT PSYCHOTIC FEATURES (HCC): Primary | ICD-10-CM

## 2019-07-01 DIAGNOSIS — F41.1 GAD (GENERALIZED ANXIETY DISORDER): ICD-10-CM

## 2019-07-08 ENCOUNTER — SOCIAL WORK (OUTPATIENT)
Dept: BEHAVIORAL/MENTAL HEALTH CLINIC | Facility: CLINIC | Age: 24
End: 2019-07-08

## 2019-07-08 DIAGNOSIS — F41.1 GAD (GENERALIZED ANXIETY DISORDER): ICD-10-CM

## 2019-07-08 DIAGNOSIS — F33.2 SEVERE EPISODE OF RECURRENT MAJOR DEPRESSIVE DISORDER, WITHOUT PSYCHOTIC FEATURES (HCC): Primary | ICD-10-CM

## 2019-07-08 DIAGNOSIS — F43.10 PTSD (POST-TRAUMATIC STRESS DISORDER): ICD-10-CM

## 2019-07-08 NOTE — PSYCH
Patient no showed  This is the second no show and support staff will call her to ask her about her commitment to therapy

## 2019-07-14 NOTE — PROGRESS NOTES
[address] [facility] Consult  PATIENT:  Marlon Felix  MRN:  [id number]  :  1995  DATE OF SERVICE:  7/15/2019  REFERRED BY: Cristian Bauer DO  PMD: Leticia Cedillo DO    Assessment/Plan:     Marlon Felix is a very pleasant 22 y o  female with a Past medical history that includes psoriatic arthritis, psoriasis, degenerative disc disease, possible lupus, possible torticollis, PTSD, possible bipolar, anxiety, panic attacks, depression, GERD, IBS, chronic neck and back pain referred here for evaluation of headache  Chronic daily headache  Migraine without aura and without status migrainosus, not intractable  Cervicalgia  Patient reports a long history of headaches and migraines since pre teen years  She describes many different headache types including bitemporal/bifrontal, bilateral sinus pressure, bilateral ear pressure, base of the neck and bioccipital - see HPI for details  She describes typical associated migrainous features across the board, without aura  - as of 07/15/2019:  Headaches every other day, migraines that last a week about once a month    We discussed likely multifactorial etiology to her headaches and migraines as well as cervicogenic, attention, migrainous components  Also likely related to lifestyle factors in comorbid medical conditions  Workup:  - patient reports normal MRI brain in the past multiple times  -most recent head imaging was noncontrast head CT 2017:  Unremarkable   and CT C-spine 2017:  No cervical spine fracture or traumatic malalignment, unremarkable  - referral to physical therapy for cervicogenic headache  - Referral to physiatry Dr Rebecca Baez to see if trigger point injections may be useful    Preventative:  - we discussed headache hygiene and lifestyle factors that may improve headaches including caffeine regulation, mental health care, sleep quality and quantity  - trial of magnesium 40 mg daily    Discussed possible side effects and risks  - discussed other headache preventative supplements she could consider including riboflavin, butterbur  - trial of topiramate with gradual up titration  -     topiramate (TOPAMAX) 25 mg tablet; 1 tab PO QHS for 1 week, increase as tolerated to 1 tab BID for 1 week, then 1 tab QAM and 2 tabs QHS for 1 week and finish at 2 tabs BID  Discussed proper use, possible side effects and risks  - she is currently on tizanidine 4 mg nightly, Ambien 5 mg nightly  - past:  Failed for migraine prevention:  Duloxetine, Depakote, gabapentin, melatonin  - Future options:  Verapamil if blood pressure would tolerate, today she reports she is not willing to start a mood medication due to previous reaction - but options include venlafaxine, lamotrigine, amitriptyline, protriptyline, olanzapine, CGRP med, Botox (patient reports she was actually referred for this and therefore will have patient see with Dr Gissel Sanders in the future)    Abortive:  - discussed not taking over-the-counter or prescription pain medications more than 3 days per week to prevent medication overuse/rebound headache  - trial of rizatriptan 10 mg as needed for migraine abortive  Discussed proper use, possible side effects and risks  -trial of prochlorperazine/Compazine as needed for migraine abortive or for nausea  Discussed proper use, possible side effects and risks  - past:  Sumatriptan 50 mg was ineffective without side effects, patient reports Depakote was prescribed by her rheumatologist and she had side effects?  - future options:   Alternative Triptan,  indomethacin, dexamethasone 1-2 mg daily with breakfast for 3-5 days, metoclopramide/Reglan, Toradol/ketorolac 10 mg PO, or IM 30-60 mg      Patient instructions     [employer] - Download this free Sudha on your phone (they will offer subscription, but you do not have to do this)  - I recommend listening to the first approximately 5 or so lectures (more if you want) that are about 10-20 minutes long on the neuroscience of pain  - They discuss how pain works in the brain and steps to try and cure chronic pain    - Referral to physical therapy  - Referral to physiatry Dr Gia Rico to see if trigger point injections would be useful, she has not typically do these on the 1st visit  - Follow up with Dentist re TMJ    Try and get MRI Brain results on CD, or radiology read paper    Headache/migraine treatment:   Abortive medications (for immediate treatment of a headache): It is ok to take ibuprofen, acetaminophen or naproxen (Advil, Tylenol,  Aleve, Excedrin) if they help your headaches you should limit these to No more than 3 times a week to avoid medication overuse/rebound headaches  Percocet is not a good medication for headaches/migraines     Migraine specific medication  For your more moderate to severe migraines take this medication early:  Maxalt (rizatriptan) (orally dissolving tablet - ODT) 10mg tabs - take one at the onset of headache  May repeat one time after 1-2 hours if pain has not resolved  (Max 2 a day and 9 a month)   - try half tab the 1st time to see how you feel  - can make you feel flushed, tingling or tightness of chest or face, palpitations, tired    Technically and nausea medication but can use for migraines with or without nausea  Prochlorperazine/Compazine 10 mg  -     prochlorperazine (COMPAZINE) 10 mg tablet;  Take 1 tablet (10 mg total) by mouth every 6 (six) hours as needed for Migraine     Over the counter preventive supplements for headaches/migraines - daily for 2-3 months  (to take every day to help prevent headaches - not to take at the time of headache):  [x] Magnesium 400mg daily (If any diarrhea or upset stomach, decrease dose  as tolerated)  [] Riboflavin (Vitamin B2) 200 mg (kids) to 400mg daily (adults)   (FYI B2 may make your urine bright/neon yellow)  AND/OR  [] Herbal medication: Petasites/Butterbur 50mg (kids) - 150 mg (adults) daily  (When choosing your Butterbur online or in the store, beware that there are some poor preps containing pyrrolizidine alkaloids (PAs) that can be harmful to the liver  Therefore, do not use butterbur products that are not certified and labeled as PA-free )    Prescription preventive medications for headaches/migraines   (to take every day to help prevent headaches - not to take at the time of headache):  [x] topiramate 25 mg nightly for 1 week, then 25 mg in a m  and 25 mg in night for 1 week, then 25 mg in a m  And 50 mg at night for 1 week, then 50 mg in a m  and 50 mg in p m  Holli Loco - if the a m  dose is making you have side effects such as drowsiness or cognitive slowing you can take the whole dose at night of course be careful taking this with you your other sleep medications  - if as you increased when you get to the next higher dose you have side effects you can go back to last tolerated dose for little longer if you would like  - some people have affect at lower doses than 100 mg daily sometimes need a little higher    *Typically these types of medications take time untill you see the benefit, although some may see improvement in days, often it may take weeks, especially if the medication is being titrated up to a beneficial level  Please contact us if there are any concerns or questions regarding the medication  If you are going to start a medication through your psychiatrist, these are medications that could also help with headache prevention:  Lamotrigine, valproic acid, venlafaxine, protriptyline      Self-Monitoring:  [x] Headache calendar  Each day julián a number from 0-10 indicating if there was a headache and how bad it was  This can be used to monitor gradual improvement and is helpful to make medication adjustments  You can do this on paper or there is an PRIYA for a smart phone called "Migraine e Diary"       Lifestyle Recommendations:  [x] SLEEP - Maintain a regular sleep schedule: Adults need at least 7-8 hours of uninterrupted a night  Maintain good sleep hygiene:  Going to bed and waking up at consistent times, avoiding excessive daytime naps, avoiding caffeinated beverages in the evening, avoid excessive stimulation in the evening and generally using bed primarily for sleeping  One hour before bedtime would recommend turning lights down lower, decreasing your activity (may read quietly, listen to music at a low volume)  When you get into bed, should eliminate all technology (no texting, emailing, playing with your phone, iPad or tablet in bed)  [x] HYDRATION - Maintain good hydration  Drink  2L of fluid a day (4 typical small water bottles)  [x] DIET - Maintain good nutrition  In particular don't skip meals and try and eat healthy balanced meals regularly  [x] TRIGGERS - Look for other triggers and avoid them: Limit caffeine to 1-2 cups a day or less  Avoid dietary triggers that you have noticed bring on your headaches (this could include aged cheese, peanuts, MSG, aspartame and nitrates)  [x] EXERCISE - physical exercise as we all know is good for you in many ways, and not only is good for your heart, but also is beneficial for your mental health, cognitive health and  chronic pain/headaches  I would encourage at the least 5 days of physical exercise weekly for at least 30 minutes  Education and Follow-up  [x] Please call with any questions or concerns  Of course if any new concerning symptoms go to the emergency department  [x] Follow up with Dr Elba Ramos in 3 months and with Dr Priyanka Dawson in the winter as a second opinion and to discuss botox        CC: We had the pleasure of evaluating Dunia Olguin in neurological consultation today  Dunia Olguin is a 22 y o    right handed female who presents today for evaluation of headaches  History obtained from patient as well as available medical record review    History of Present Illness:   Past medical history includes psoriatic arthritis, psoriasis, DDD, possible lupus, possible torticollis, PTSD, possible bipolar, anxiety, panic attacks, depression, GERD, IBS, chronic neck and back pain     She reports she was referred from physiatrist Dr Arjun Gabriel at [facility]  since her EMG showed "it lit up towards the back of the neck and brain and he does not understand why" therefore "He referred me for botox shots of my whole spine"    What is your current pain level - 6    Headaches started at what age? 9years old  How often do the headaches occur?   - as of 7/15/19: headaches every other day, Migraines that last a week "I do not know because I have memory problems as well" - maybe once a month  What time of the day do the headaches start? Always have a headache, no particular time of day are worse  How long do the headaches last? 6-12 hours   Are you ever headache free? No  Aura? without aura    Where is your headache located and pain quality? Different types  - some are bitemporal or bifrontal thumping, sharpness, throbbing, pulsating, pressure there, tight band, achy  - bilateral sinus pressure  - Some are bilateral ears pressure, shooting, stabbing, sometimes only right side  - base of the neck, bioccipital - pressure, stabbing, throbbing, tightness  What is the intensity of pain?  10/10  Associated symptoms:   [x] Nausea       [x] Vomiting        [] Diarrhea  [x] Insomnia    [x] Stiff or sore neck   [x] Problems with concentration  [x] Photophobia     [x]Phonophobia      [x] Osmophobia  [x] Blurred vision    [x] Prefer quiet, dark room  [x] Light-headed or dizzy     [x] Tinnitus   [x] Hands or feet tingle or feel numb/paresthesias      [] Red ear      [] Ptosis      [] Facial droop  [] Lacrimation  [x] Nasal congestion/rhinorrhea   [x] Flushing>pale face  [] Change in pupil size    Number of days missed per month because of headaches:  Work (or school) days: 4  Social or Family activities: 2    Things that make the headache worse?any movement, just blinking hurts     Headache triggers:  chewing, stress, intercourse sometimes after, exercise, related to sleep, sunlight, fatigue, position changes, neck    Have you seen someone else for headaches or pain? Yes, Dr Rosales Aguila with Physiatry   Have you had trigger point injection performed and how often? No  Have you had Botox injection performed and how often? No   Have you had epidural injections or transforaminal injections performed? No  - had cortisone shots in her SI joint that she reports did not work   Are you current pregnant or planning on getting pregnant? No, on birth control   Have you ever had any Brain imaging? MRI Brain - multiple in the past, last unsure how long ago, maybe 3 years ago normal per patient     What medications do you take or have you taken for your headaches?    ABORTIVE:      ibuoprofen 800 mg once every three days   Percocet - 1/4th - prescribed a year ago, takes once a month - helps      Past:  Sanjuana  made it worse  Sumatriptan 50 mg did not work - does not what dose - took for 5 days in a row     PREVENTIVE:   -     Tizanidine 4 mg nightly     For Mood/"nerve damage": clonazepam 0 5 mg twice a month   For Sleep:  Ambien 5 mg    Past:  - Duloxetine - went up to 90 mg and weaned off and then stopped end of June - side effects of hallucinating, extreme depression, SI (does not have SI currently)  - depakote from "rheumatologist" she says for "arthritis" - side effects of N/V, chest pains, diarrhea  - gabapentin - did not work twice a day she thinks 500 mg BID   - melatonin a year     Never  - wants to stay away from antidepressant or mood stabilizers  - amitriptyline, protriptyline  - Venlafaxine  - Lamotrigine/lamictal    Alternative therapies used in the past for headaches? chiropractic care, massage    Neck pain?: chronic     LIFESTYLE  Sleep chronic insomnia  - "I need a very heavy sleep medication to put me out, ambien and melatonin do not work)  - averages: 3 hours  Problems falling asleep?: Yes  Problems staying asleep?:  Yes  - does not snore    Physical exercise: every day gym  Water: 5 bottles  Caffeine: coffee cup in am, sometimes in pm - about 16 oz a day     Mood:   PTSD, possible bipolar, anxiety, panic attacks, depression,   - Psychiatry apmt in August  - once a week counseling     The following portions of the patient's history were reviewed and updated as appropriate: allergies, current medications, past family history, past medical history, past social history, past surgical history and problem list     Past Medical History:     Past Medical History:   Diagnosis Date    GERD (gastroesophageal reflux disease)     Irritable bowel syndrome     Psoriasis        Patient Active Problem List   Diagnosis    Severe episode of recurrent major depressive disorder, without psychotic features ([facility] [address] )    SHAHRIAR (generalized anxiety disorder)    PTSD (post-traumatic stress disorder)       Medications:      Current Outpatient Medications   Medication Sig Dispense Refill    Certolizumab Pegol (CIMZIA SC) Inject under the skin 2 syringes every 4 weeks      clonazePAM (KlonoPIN) 0 5 mg tablet Take 0 5 mg by mouth 2 (two) times a day as needed      Norgestrel-Ethinyl Estradiol (CRYSELLE-28 PO) Take 1 tablet by mouth daily   oxyCODONE-acetaminophen (PERCOCET) 5-325 mg per tablet Take 1 tablet by mouth every 4 (four) hours as needed for moderate pain      tiZANidine (ZANAFLEX) 4 mg tablet Take 4 mg by mouth daily at bedtime  1    omeprazole (PriLOSEC) 40 MG capsule Take 40 mg by mouth daily      zolpidem (AMBIEN) 5 mg tablet Take 5 mg by mouth daily at bedtime as needed for sleep       No current facility-administered medications for this visit  Allergies:       Allergies   Allergen Reactions    Pollen Extract      Other reaction(s): Rhinitis (Runny Nose, Stuffy Nose, Sneezing)       Family History:   Family history of headaches:  Migraines/vertigo in maternal grandma possible   Any family history of aneurysms - Yes - paternal grandpa  of one in his 62s  History reviewed  No pertinent family history      Social History:   Work:   Education: GED  Lives with dad, mom, grandma    Illicit Drugs: denies   Alcohol/tobacco: quit smoking cigarettes 19, now vape  Alcohol socially     Social History     Socioeconomic History    Marital status: Single     Spouse name: Not on file    Number of children: Not on file    Years of education: Not on file    Highest education level: Not on file   Occupational History    Not on file   Social Needs    Financial resource strain: Not on file    Food insecurity:     Worry: Not on file     Inability: Not on file    Transportation needs:     Medical: Not on file     Non-medical: Not on file   Tobacco Use    Smoking status: Current Every Day Smoker     Packs/day: 0 25     Types: Cigarettes    Smokeless tobacco: Never Used   Substance and Sexual Activity    Alcohol use: No    Drug use: No    Sexual activity: Not on file   Lifestyle    Physical activity:     Days per week: Not on file     Minutes per session: Not on file    Stress: Not on file   Relationships    Social connections:     Talks on phone: Not on file     Gets together: Not on file     Attends Nondenominational service: Not on file     Active member of club or organization: Not on file     Attends meetings of clubs or organizations: Not on file     Relationship status: Not on file    Intimate partner violence:     Fear of current or ex partner: Not on file     Emotionally abused: Not on file     Physically abused: Not on file     Forced sexual activity: Not on file   Other Topics Concern    Not on file   Social History Narrative    Not on file         Objective:     Physical Exam:                                                                 Vitals:            Constitutional:    /80 (BP Location: Right arm, Patient Position: Sitting, Cuff Size: Standard)   Pulse 92   Ht 5' 6 5" (1 689 m)   Wt 69 9 kg (154 lb 3 2 oz)   BMI 24 52 kg/m²   BP Readings from Last 3 Encounters:   07/15/19 110/80   10/15/18 119/72   04/01/18 109/61     Pulse Readings from Last 3 Encounters:   07/15/19 92   10/15/18 79   04/01/18 64         Well developed, well nourished, well groomed  No dysmorphic features  HEENT:  Normocephalic atraumatic  No meningismus  Oropharynx is clear and moist  No oral mucosal lesions  Chest:  Respirations regular and unlabored  Cardiovascular:  Regular rate, intact distal pulses  Distal extremities warm without palpable edema or tenderness, no observed significant swelling  Musculoskeletal:  Full range of motion  (see below under neurologic exam for evaluation of motor function and gait)   Skin:  warm and dry, not diaphoretic  No apparent birthmarks or stigmata of neurocutaneous disease  Psychiatric:  Normal behavior and appropriate affect        Neurological Examination:     Mental status/cognitive function:   Orientated to time, place and person  Recent and remote memory intact  Attention span and concentration as well as fund of knowledge are appropriate for age  Normal language and spontaneous speech  Cranial Nerves:  II-visual fields full  Fundi poorly visualized due to pupillary constriction  III, IV, VI-Pupils were equal, round, and reactive to light and accomodation  Extraocular movements were full and conjugate without nystagmus  V-facial sensation symmetric  VII-facial expression symmetric, intact forehead wrinkle, strong eye closure, symmetric smile    VIII-hearing grossly intact bilaterally   IX, X-palate elevation symmetric, no dysarthria  XI-shoulder shrug strength intact    XII-tongue protrusion midline  Motor Exam: symmetric bulk and tone throughout, no pronator drift  Power/strength 5/5 bilateral upper and lower extremities, no atrophy, fasciculations or abnormal movements noted     Sensory: grossly intact light touch in all extremities  Reflexes: brachioradialis 2+, biceps 2+, knee 2+, ankle 2+ bilaterally  No ankle clonus  Coordination: Finger nose finger intact bilaterally, no apparent dysmetria, ataxia or tremor noted  Gait: steady casual and tandem gait  Romberg Negative  Pertinent lab results:   03/31/2018:  CMP and CBC unremarkable     Imaging:   Noncontrast head CT 06/05/2017:  Unremarkable  CT C-spine 06/05/2017:  No cervical spine fracture or traumatic malalignment, unremarkable  MRI Brain normal in the past      Review of Systems:   ROS obtained by medical assistant Personally reviewed and updated if indicated  Review of Systems   Constitutional: Positive for appetite change and fatigue  Negative for fever  HENT: Positive for tinnitus  Negative for hearing loss, trouble swallowing and voice change  Eyes: Positive for pain  Negative for photophobia  Respiratory: Negative  Negative for shortness of breath  Cardiovascular: Negative  Negative for palpitations  Gastrointestinal: Positive for abdominal pain and nausea  Negative for vomiting  Endocrine: Negative  Negative for cold intolerance and heat intolerance  Genitourinary: Negative  Negative for dysuria, frequency and urgency  Musculoskeletal: Positive for back pain, myalgias and neck pain  Muscle pain   Pain while walking   Skin: Negative  Negative for rash  Neurological: Positive for dizziness, light-headedness, numbness and headaches  Negative for tremors, seizures, syncope, facial asymmetry, speech difficulty and weakness  Memory loss   Tingling   Increased sleepiness   Hematological: Bruises/bleeds easily  Psychiatric/Behavioral: Positive for confusion and sleep disturbance (staying alseep and falling asleep)  Negative for hallucinations     Anxiety   Depression       I have spent 80 minutes with Patient  today in which greater than 50% of this time was spent in counseling/coordination of care regarding Prognosis, Risks and benefits of tx options, Intructions for management, Importance of tx compliance, Risk factor reductions and Impressions        Author:  Helena Alvarado MD 7/15/2019 4:12 PM

## 2019-07-15 ENCOUNTER — CONSULT (OUTPATIENT)
Dept: NEUROLOGY | Facility: CLINIC | Age: 24
End: 2019-07-15
Payer: COMMERCIAL

## 2019-07-15 VITALS
BODY MASS INDEX: 24.2 KG/M2 | SYSTOLIC BLOOD PRESSURE: 110 MMHG | WEIGHT: 154.2 LBS | HEART RATE: 92 BPM | HEIGHT: 67 IN | DIASTOLIC BLOOD PRESSURE: 80 MMHG

## 2019-07-15 DIAGNOSIS — R51.9 CHRONIC DAILY HEADACHE: Primary | ICD-10-CM

## 2019-07-15 DIAGNOSIS — M54.2 CERVICALGIA: ICD-10-CM

## 2019-07-15 DIAGNOSIS — G43.009 MIGRAINE WITHOUT AURA AND WITHOUT STATUS MIGRAINOSUS, NOT INTRACTABLE: ICD-10-CM

## 2019-07-15 PROCEDURE — 99245 OFF/OP CONSLTJ NEW/EST HI 55: CPT | Performed by: PSYCHIATRY & NEUROLOGY

## 2019-07-15 RX ORDER — TIZANIDINE 4 MG/1
4 TABLET ORAL
Refills: 1 | COMMUNITY
Start: 2019-06-24 | End: 2020-04-22 | Stop reason: SDUPTHER

## 2019-07-15 RX ORDER — CLONAZEPAM 0.5 MG/1
0.5 TABLET ORAL 2 TIMES DAILY PRN
COMMUNITY

## 2019-07-15 RX ORDER — PROCHLORPERAZINE MALEATE 10 MG
10 TABLET ORAL EVERY 6 HOURS PRN
Qty: 12 TABLET | Refills: 3 | Status: SHIPPED | OUTPATIENT
Start: 2019-07-15 | End: 2020-03-12 | Stop reason: SDUPTHER

## 2019-07-15 RX ORDER — TOPIRAMATE 25 MG/1
TABLET ORAL
Qty: 120 TABLET | Refills: 1 | Status: SHIPPED | OUTPATIENT
Start: 2019-07-15 | End: 2019-08-26 | Stop reason: ALTCHOICE

## 2019-07-15 RX ORDER — RIZATRIPTAN BENZOATE 10 MG/1
10 TABLET ORAL ONCE AS NEEDED
Qty: 9 TABLET | Refills: 3 | Status: SHIPPED | OUTPATIENT
Start: 2019-07-15 | End: 2020-03-12 | Stop reason: SDUPTHER

## 2019-07-15 NOTE — PROGRESS NOTES
Patient ID: Lawrence Quezada is a 22 y o  female  Assessment/Plan:    No problem-specific Assessment & Plan notes found for this encounter  {Assess/PlanSmartLinks:61813}       Subjective:    HPI    {[facility] HPI texts:29735}    {Common ambulatory SmartLinks:33905}         Objective: There were no vitals taken for this visit  Physical Exam    Neurological Exam      ROS:    Review of Systems   Constitutional: Positive for appetite change and fatigue  Negative for fever  HENT: Positive for tinnitus  Negative for hearing loss, trouble swallowing and voice change  Eyes: Positive for pain  Negative for photophobia  Respiratory: Negative  Negative for shortness of breath  Cardiovascular: Negative  Negative for palpitations  Gastrointestinal: Positive for abdominal pain and nausea  Negative for vomiting  Endocrine: Negative  Negative for cold intolerance and heat intolerance  Genitourinary: Negative  Negative for dysuria, frequency and urgency  Musculoskeletal: Positive for back pain, myalgias and neck pain  Muscle pain   Pain while walking   Skin: Negative  Negative for rash  Neurological: Positive for dizziness, light-headedness, numbness and headaches  Negative for tremors, seizures, syncope, facial asymmetry, speech difficulty and weakness  Memory loss  Tingling  Increased sleepiness   Hematological: Bruises/bleeds easily  Psychiatric/Behavioral: Positive for confusion and sleep disturbance (staying alseep and falling asleep)  Negative for hallucinations          Anxiety  Depression

## 2019-07-15 NOTE — PATIENT INSTRUCTIONS
[employer] - Download this free Sudha on your phone (they will offer subscription, but you do not have to do this)  - I recommend listening to the first approximately 5 or so lectures (more if you want) that are about 10-20 minutes long on the neuroscience of pain  - They discuss how pain works in the brain and steps to try and cure chronic pain    - Referral to physical therapy  - Referral to physiatry Dr Paradise Mario to see if trigger point injections would be useful, she has not typically do these on the 1st visit  - Follow up with Dentist re TMJ    Try and get MRI Brain results on CD, or radiology read paper    Headache/migraine treatment:   Abortive medications (for immediate treatment of a headache): It is ok to take ibuprofen, acetaminophen or naproxen (Advil, Tylenol,  Aleve, Excedrin) if they help your headaches you should limit these to No more than 3 times a week to avoid medication overuse/rebound headaches  Percocet is not a good medication for headaches/migraines     Migraine specific medication  For your more moderate to severe migraines take this medication early:  Maxalt (rizatriptan) (orally dissolving tablet - ODT) 10mg tabs - take one at the onset of headache  May repeat one time after 1-2 hours if pain has not resolved  (Max 2 a day and 9 a month)   - try half tab the 1st time to see how you feel  - can make you feel flushed, tingling or tightness of chest or face, palpitations, tired    Technically and nausea medication but can use for migraines with or without nausea  Prochlorperazine/Compazine 10 mg  -     prochlorperazine (COMPAZINE) 10 mg tablet;  Take 1 tablet (10 mg total) by mouth every 6 (six) hours as needed for Migraine     Over the counter preventive supplements for headaches/migraines - daily for 2-3 months  (to take every day to help prevent headaches - not to take at the time of headache):  [x] Magnesium 400mg daily (If any diarrhea or upset stomach, decrease dose  as tolerated)  [] Riboflavin (Vitamin B2) 200 mg (kids) to 400mg daily (adults)   (FYI B2 may make your urine bright/neon yellow)  AND/OR  [] Herbal medication: Petasites/Butterbur 50mg (kids) - 150 mg (adults) daily  (When choosing your Butterbur online or in the store, beware that there are some poor preps containing pyrrolizidine alkaloids (PAs) that can be harmful to the liver  Therefore, do not use butterbur products that are not certified and labeled as PA-free )    Prescription preventive medications for headaches/migraines   (to take every day to help prevent headaches - not to take at the time of headache):  [x] topiramate 25 mg nightly for 1 week, then 25 mg in a m  and 25 mg in night for 1 week, then 25 mg in a m  And 50 mg at night for 1 week, then 50 mg in a m  and 50 mg in p m  Marky Ellis - if the a m  dose is making you have side effects such as drowsiness or cognitive slowing you can take the whole dose at night of course be careful taking this with you your other sleep medications  - if as you increased when you get to the next higher dose you have side effects you can go back to last tolerated dose for little longer if you would like  - some people have affect at lower doses than 100 mg daily sometimes need a little higher    *Typically these types of medications take time untill you see the benefit, although some may see improvement in days, often it may take weeks, especially if the medication is being titrated up to a beneficial level  Please contact us if there are any concerns or questions regarding the medication  If you are going to start a medication through your psychiatrist, these are medications that could also help with headache prevention:  Lamotrigine, valproic acid, venlafaxine, protriptyline      Self-Monitoring:  [x] Headache calendar  Each day julián a number from 0-10 indicating if there was a headache and how bad it was    This can be used to monitor gradual improvement and is helpful to make medication adjustments  You can do this on paper or there is an PRIYA for a smart phone called "Migraine e Diary"  Lifestyle Recommendations:  [x] SLEEP - Maintain a regular sleep schedule: Adults need at least 7-8 hours of uninterrupted a night  Maintain good sleep hygiene:  Going to bed and waking up at consistent times, avoiding excessive daytime naps, avoiding caffeinated beverages in the evening, avoid excessive stimulation in the evening and generally using bed primarily for sleeping  One hour before bedtime would recommend turning lights down lower, decreasing your activity (may read quietly, listen to music at a low volume)  When you get into bed, should eliminate all technology (no texting, emailing, playing with your phone, iPad or tablet in bed)  [x] HYDRATION - Maintain good hydration  Drink  2L of fluid a day (4 typical small water bottles)  [x] DIET - Maintain good nutrition  In particular don't skip meals and try and eat healthy balanced meals regularly  [x] TRIGGERS - Look for other triggers and avoid them: Limit caffeine to 1-2 cups a day or less  Avoid dietary triggers that you have noticed bring on your headaches (this could include aged cheese, peanuts, MSG, aspartame and nitrates)  [x] EXERCISE - physical exercise as we all know is good for you in many ways, and not only is good for your heart, but also is beneficial for your mental health, cognitive health and  chronic pain/headaches  I would encourage at the least 5 days of physical exercise weekly for at least 30 minutes  Education and Follow-up  [x] Please call with any questions or concerns  Of course if any new concerning symptoms go to the emergency department    [x] Follow up with Dr Eloisa Cadena in 3 months and with Dr García Dodson in the winter as a second opinion and to discuss botox

## 2019-07-22 ENCOUNTER — SOCIAL WORK (OUTPATIENT)
Dept: BEHAVIORAL/MENTAL HEALTH CLINIC | Facility: CLINIC | Age: 24
End: 2019-07-22
Payer: COMMERCIAL

## 2019-07-22 DIAGNOSIS — F31.81 BIPOLAR II DISORDER (HCC): Primary | ICD-10-CM

## 2019-07-22 DIAGNOSIS — F41.1 GAD (GENERALIZED ANXIETY DISORDER): ICD-10-CM

## 2019-07-22 DIAGNOSIS — F43.10 PTSD (POST-TRAUMATIC STRESS DISORDER): ICD-10-CM

## 2019-07-22 PROCEDURE — 90834 PSYTX W PT 45 MINUTES: CPT | Performed by: SOCIAL WORKER
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
## 2019-07-22 NOTE — BH TREATMENT PLAN
[facility]  1995       Date of Initial Treatment Plan: 07/22/2019  Date of Current Treatment Plan: 07/22/19    Treatment Plan Number Initial due to no shows    Strengths/Personal Resources for Self Care: I try to be positive minded with others, generally a strong person, I am too smart for my own good    Diagnosis:   Depression, anxiety, fleeting suicidal ideation, criteria for Bipolar,anger    Area of Needs: I need to develop coping skills for my depression,anxiety, I get manicky more so at night and I dont sleep well, I have anger issues  Long Term Goal 1: I need to develop coping skills for my depression and my anxiety    Target Date:11/22/2019  Completion Date: TBD         Short Term Objectives for Goal 1: Mindfulness strategies    Long Term Goal 2: I get manicky at night and dont sleep    Target Date: 11/22/2019  Completion Date: TBD    Short Term Objectives for Goal 2: Meditation, deep breathing and medication compliance         Long Term Goal # 3: I have anger management issues     Target Date: 11/22/2019  Completion Date: TBD    Short Term Objectives for Goal 3: anger management issues    GOAL 1: Modality: 2 times a month TBD completion     GOAL 2: Modality: Individual 2x per month   Completion Date TBD     GOAL 3: Modality: Individual 2x per month   Completion Date TBD      Behavioral Health Treatment Plan  Luke: Diagnosis and Treatment Plan explained to Nancy Salmeron relates understanding diagnosis and is agreeable to Treatment Plan         Client Comments : Please share your thoughts, feelings, need and/or experiences regarding your treatment plan: David De La Paz participated in the development of the plan

## 2019-07-31 ENCOUNTER — TELEPHONE (OUTPATIENT)
Dept: BEHAVIORAL/MENTAL HEALTH CLINIC | Facility: CLINIC | Age: 24
End: 2019-07-31

## 2019-07-31 NOTE — TELEPHONE ENCOUNTER
George called and asked if she could speak with you she said it is important  She is on medical leave coming up on 8/5/19  She would like to speak with you about this  Also wanted you to have a fyi about the medical leave

## 2019-08-06 ENCOUNTER — OFFICE VISIT (OUTPATIENT)
Dept: PSYCHIATRY | Facility: CLINIC | Age: 24
End: 2019-08-06
Payer: COMMERCIAL

## 2019-08-06 VITALS
SYSTOLIC BLOOD PRESSURE: 116 MMHG | HEIGHT: 66 IN | BODY MASS INDEX: 24.22 KG/M2 | DIASTOLIC BLOOD PRESSURE: 82 MMHG | HEART RATE: 73 BPM | WEIGHT: 150.7 LBS

## 2019-08-06 DIAGNOSIS — F31.81 BIPOLAR II DISORDER (HCC): Primary | ICD-10-CM

## 2019-08-06 PROCEDURE — 90833 PSYTX W PT W E/M 30 MIN: CPT | Performed by: NURSE PRACTITIONER

## 2019-08-06 PROCEDURE — 99245 OFF/OP CONSLTJ NEW/EST HI 55: CPT | Performed by: NURSE PRACTITIONER

## 2019-08-06 RX ORDER — QUETIAPINE FUMARATE 50 MG/1
50 TABLET, FILM COATED ORAL
Qty: 30 TABLET | Refills: 0 | Status: SHIPPED | OUTPATIENT
Start: 2019-08-06 | End: 2019-08-26 | Stop reason: ALTCHOICE
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
## 2019-08-13 ENCOUNTER — TELEPHONE (OUTPATIENT)
Dept: NEUROLOGY | Facility: CLINIC | Age: 24
End: 2019-08-13

## 2019-08-13 NOTE — TELEPHONE ENCOUNTER
Patient stated she has been calling our office since Sunday and was waiting for Dr Adrianna Alford to call her back  (Informed patient that Dr Adrianna Alford is out of the office this week ) c/o of sxs for about one week  Patient thinks her topiramate is causing her sxs, x 1 week  Patient is on her second week of topiramate, she reports she is doing 50 mg at bed  (she was supposed to be taking 25 mg BID but she was unaware of this)  "my brain is shaking"   C/ of on and off "staticky" and tingling of brain, denies tingling sensation anywhere else  also nauseated, can't think straight, trouble finding words, feels like her speech is slurred, generalized weakness and fatigue  Denies any other sxs  Also started seroquel 50 mg at bed last week  Please advise

## 2019-08-13 NOTE — TELEPHONE ENCOUNTER
Have her decrease to 25 mg x 3 nights then stop  Have her call back after she is off the topamax to see how she is doing  Might be the seroquel but this is from another provider

## 2019-08-14 NOTE — TELEPHONE ENCOUNTER
she states that she had the side effects prior to starting seroquel but it got worse since starting seroquel  she will decrease topamax and then stop    she will also call prescriber of seroquel

## 2019-08-15 ENCOUNTER — TELEPHONE (OUTPATIENT)
Dept: PSYCHIATRY | Facility: CLINIC | Age: 24
End: 2019-08-15

## 2019-08-15 NOTE — TELEPHONE ENCOUNTER
Patient is on seroquel and is having side effects she spoke with her neurologist about another medication that she is also having side effects with and feels they may be interacting with each other

## 2019-08-19 ENCOUNTER — SOCIAL WORK (OUTPATIENT)
Dept: BEHAVIORAL/MENTAL HEALTH CLINIC | Facility: CLINIC | Age: 24
End: 2019-08-19
Payer: COMMERCIAL

## 2019-08-19 ENCOUNTER — TELEPHONE (OUTPATIENT)
Dept: PSYCHIATRY | Facility: CLINIC | Age: 24
End: 2019-08-19

## 2019-08-19 DIAGNOSIS — F41.1 GAD (GENERALIZED ANXIETY DISORDER): Primary | ICD-10-CM

## 2019-08-19 DIAGNOSIS — F31.81 BIPOLAR II DISORDER (HCC): ICD-10-CM

## 2019-08-19 DIAGNOSIS — F43.10 PTSD (POST-TRAUMATIC STRESS DISORDER): ICD-10-CM

## 2019-08-19 PROCEDURE — 90834 PSYTX W PT 45 MINUTES: CPT | Performed by: SOCIAL WORKER

## 2019-08-19 NOTE — TELEPHONE ENCOUNTER
Patient is no longer taking topamax originally prescribed by neurologist and she is still having side effects with seroquel

## 2019-08-19 NOTE — TELEPHONE ENCOUNTER
Nursing called patient to follow up  Ronni reported that she was made aware on 8/13/2019 that her provider, Sonny Bowman had to cancel her 8/30/19 appointment  She also stated that it was relayed that she will need to be transferred to a different provider  Tonio Haristate stated that she wants to move forward to ensure she has a provider, but has not received a return call  Tonio Michelle is looking for guidance regarding her medication, Seroquel  She is unsure if she should stop it because it is not helpful with sleep  She reports that she has also been taken off Topamax recently due to side effects  She is reporting lethargy, decreased appetite and nausea even though Topamax has been stopped (see neurology notes in chart) and is questioning if it may be the Seroquel  She reports sleep as "terrible" and not sleeping at night and "no energy during the day and sleeping during the day"  She reported a visit to ED yesterday for nausea and right flank pain  She is concerned about medical leave paperwork that she provided at her visit on 8/6/19" to Frankie Maloney at the "  Tonio Michelle reports that she needs this paperwork filled out and sent to her HR department asap  It was explained that the provider is the only person able to fill out the paperwork and a message will be sent to provider when she returns from vacation  Tonio Michelle continues to be interested in the partial program  For Dr Tripp Ravi to review

## 2019-08-19 NOTE — TELEPHONE ENCOUNTER
Patient calling in with update  Patient stopped topamax on the 17th  Continues with symptoms of random "zaps" in her brain, extreme fatigue, has a hard time waking up, loss of appetite  Patient still on seroquel has reached out to psych MD but still waiting on a call back  Just an FYI

## 2019-08-19 NOTE — PSYCH
Psychotherapy Provided: Individual Psychotherapy 45 minutes     Length of time in session: 45 minutes, follow up in 2 week    Goals addressed in session: Goal 1, Goal 2 and Goal 3      Pain:      moderate to severe    0    Current suicide risk : Low     Data: Florencia told me that she definitely wants partial  She also sees Anselmo Elizalde the nurse practitioner  I spoke to Brewster Hillnicole Mohr and there about 6 people ahead of her  She talked about her medications and she feels they are not working the way she thinks they should and has concerns about possible side effects she is feeling  She claims she has left messages here for [address] got back via voicemail telling her she will see someone else but she still has not heard back from a practitioner and about her leave paperwork  Florencia shared she is very depressed and anxious  She is not sleeping at all  She is up all night and sometimes in the day she is sleeping all day and gets nothing done  She admits to constant suicidal ideation but denies plan or intent  She paces in her room due to anxiety about not working or doing anything  She cries and feels terrible in her words  She feels she had complications due to her medications and last evening ended up in the [facility] ED  She thought the meds were making her sick  They did blood work but told her to go to her primary  She digs her nails into her thighs or arms in her words to divert her feelings  Lucia did tell her to discontinue her Topomax and they advised her to speak to us about the Seroquel because they did not feel it was doing her well  Regarding her goals she is sad she is sad all of the time  She wants to feel normal but remains depressed, anxious and regarding goal 2 she does not feel her manic behavior is decreasing  Regarding goal 3 her anger is intermittent but she feels strategies we have discussed have helped  Assessment: She is awaiting partial and in her words has concerns with her medications   She remains depressed and anxious  Plan: Working on getting her into partial       [address]: Diagnosis and Treatment Plan explained to Matt Eneida relates understanding diagnosis and is agreeable to Treatment Plan   Yes

## 2019-08-19 NOTE — TELEPHONE ENCOUNTER
Florencia notes lethargy, nausea, poor appetite  Also was on topamax and that made her slowed  Then seroquel made that even worse  Stopped topamax 2 days ago  No improvement so far  "randomly falling asleep"  Felt depressed and anxious more since starting     Denies wanting to harm self  Some SI, but normal depression  No plan or intent  Feels safe  Would seek help if issues/concerns  P:   - Stop seroquel  - She looks forward to hear from Quita Ley when she returns from [place]  - I gave nurses number  - Menjivar Man may be interested in retrial of seroquel once she feels more at her baseline

## 2019-08-20 ENCOUNTER — TELEPHONE (OUTPATIENT)
Dept: PSYCHIATRY | Facility: CLINIC | Age: 24
End: 2019-08-20

## 2019-08-21 ENCOUNTER — TELEPHONE (OUTPATIENT)
Dept: PSYCHOLOGY | Facility: CLINIC | Age: 24
End: 2019-08-21

## 2019-08-26 ENCOUNTER — OFFICE VISIT (OUTPATIENT)
Dept: PSYCHOLOGY | Facility: CLINIC | Age: 24
End: 2019-08-26
Payer: COMMERCIAL

## 2019-08-26 ENCOUNTER — OFFICE VISIT (OUTPATIENT)
Dept: PSYCHIATRY | Facility: CLINIC | Age: 24
End: 2019-08-26
Payer: COMMERCIAL

## 2019-08-26 VITALS
TEMPERATURE: 98.6 F | BODY MASS INDEX: 23.78 KG/M2 | SYSTOLIC BLOOD PRESSURE: 103 MMHG | WEIGHT: 148 LBS | DIASTOLIC BLOOD PRESSURE: 64 MMHG | HEART RATE: 65 BPM | HEIGHT: 66 IN | RESPIRATION RATE: 18 BRPM

## 2019-08-26 DIAGNOSIS — F31.81 BIPOLAR II DISORDER (HCC): Primary | ICD-10-CM

## 2019-08-26 DIAGNOSIS — F43.10 PTSD (POST-TRAUMATIC STRESS DISORDER): ICD-10-CM

## 2019-08-26 DIAGNOSIS — Z79.899 HIGH RISK MEDICATION USE: ICD-10-CM

## 2019-08-26 DIAGNOSIS — F41.1 GAD (GENERALIZED ANXIETY DISORDER): ICD-10-CM

## 2019-08-26 PROBLEM — H53.149 VISUAL DISCOMFORT: Status: ACTIVE | Noted: 2017-06-14

## 2019-08-26 PROBLEM — S16.1XXA CERVICAL STRAIN: Status: ACTIVE | Noted: 2017-06-14

## 2019-08-26 PROBLEM — R11.0 NAUSEA WITHOUT VOMITING: Status: ACTIVE | Noted: 2017-06-14

## 2019-08-26 PROBLEM — G44.309 POST-TRAUMATIC HEADACHE: Status: ACTIVE | Noted: 2017-06-14

## 2019-08-26 PROBLEM — S06.9XAS LATE EFFECT OF INTRACRANIAL INJURY WITHOUT SKULL FRACTURE: Status: ACTIVE | Noted: 2017-06-14

## 2019-08-26 PROBLEM — R42 DISEQUILIBRIUM: Status: ACTIVE | Noted: 2017-06-14

## 2019-08-26 PROBLEM — S06.0XAA CONCUSSION: Status: ACTIVE | Noted: 2017-06-14

## 2019-08-26 PROBLEM — S06.0X9A CONCUSSION: Status: ACTIVE | Noted: 2017-06-14

## 2019-08-26 PROBLEM — S06.9X9S LATE EFFECT OF INTRACRANIAL INJURY WITHOUT SKULL FRACTURE (HCC): Status: ACTIVE | Noted: 2017-06-14

## 2019-08-26 PROCEDURE — 90791 PSYCH DIAGNOSTIC EVALUATION: CPT | Performed by: PSYCHIATRY & NEUROLOGY

## 2019-08-26 PROCEDURE — H0035 MH PARTIAL HOSP TX UNDER 24H: HCPCS

## 2019-08-26 PROCEDURE — 90791 PSYCH DIAGNOSTIC EVALUATION: CPT

## 2019-08-26 RX ORDER — KETOROLAC TROMETHAMINE 10 MG/1
10 TABLET, FILM COATED ORAL 3 TIMES DAILY
Refills: 0 | COMMUNITY
Start: 2019-08-20 | End: 2020-01-07 | Stop reason: ALTCHOICE

## 2019-08-26 RX ORDER — ZOLPIDEM TARTRATE 5 MG/1
5 TABLET ORAL
COMMUNITY
End: 2020-01-07 | Stop reason: ALTCHOICE

## 2019-08-26 RX ORDER — ZIPRASIDONE HYDROCHLORIDE 40 MG/1
40 CAPSULE ORAL
Qty: 30 CAPSULE | Refills: 0 | Status: SHIPPED | OUTPATIENT
Start: 2019-08-26 | End: 2019-09-30 | Stop reason: SDUPTHER

## 2019-08-26 RX ORDER — CARISOPRODOL 350 MG/1
350 TABLET ORAL 2 TIMES DAILY
Refills: 0 | COMMUNITY
Start: 2019-08-20 | End: 2020-01-07 | Stop reason: ALTCHOICE

## 2019-08-26 RX ORDER — ERGOCALCIFEROL 1.25 MG/1
1 CAPSULE ORAL WEEKLY
Refills: 0 | COMMUNITY
Start: 2019-07-29 | End: 2020-01-07 | Stop reason: ALTCHOICE

## 2019-08-26 RX ORDER — ALBUTEROL SULFATE 90 UG/1
1 AEROSOL, METERED RESPIRATORY (INHALATION) EVERY 4 HOURS PRN
COMMUNITY
Start: 2019-08-18

## 2019-08-26 NOTE — PSYCH
Subjective:     Patient ID: Marilou Sykes is a 22 y o  female  [facility] Clinical Progress Notes      Specialized Services Documentation  Therapist must complete separate progress note for each specific clinical activity in which the individual participated during the day  Group Psychotherapy      ([id number]) Marilou Sykes participated in group psychotherapy process today  Clients checked in, shared how they were feeling, and a challenge or a success from the weekend  Clients engaged in cinemetherapy to promote emotional awareness, insight, inspiration, and connectivity to a specific topic  Clients discussed reactions to the short segments of individuals experiencing mental health crises and shared insight  This writer debriefed on emotional reactions to film  Florencia was quiet but engaged in group  She was receptive  Little [place] made progress towards goals today through group participation and is encouraged to continue participating to progress towards long term goals  [place] Goals: 1 1, 1 2, 1 4 Therapist: Kellie Galarza MA, [facility]    Other     Case Management Note    Kellie Galarza LPC    Current suicide risk : Low     ([id number]) CM met with Marilou Sykes  Reviewed program structure, expectations and she was given on call number and crisis phone numbers  Marilou Sykes completed releases and OP providers/ PCP notified of admission and health care coordination form completed  Completed initial psycho-social evaluation and initial treatment goals discussed  Medications changes/added/denied? Yes     Treatment session number: 1    Individual Case Management Visit provided today?  Yes     [facility] follow up physician's orders: None at this time
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
## 2019-08-26 NOTE — PSYCH
Subjective:     Patient ID: Jeane Michel is a 22 y o  female  [facility] Clinical Progress Notes      Specialized Services Documentation  Therapist must complete separate progress note for each specific clinical activity in which the individual participated during the day  Group Psychotherapy     ([id number])   Solomon Plummer RN     Jeane Michel attended wellness group: [id number]- Discussion began with -  [address] can be a powerful catalyst for healing and growth for anybody who is open to learning how movies affect us and gives us conscious awareness  Cinema therapy allows us to use the effects of imagery, plot, music, etc  in films on our psych for insight, inspiration, emotional release or relief and natural changes  Group participants were able to see that mental illness is not curable but very manageable  The movie  emphasizes why medication compliance as well as therapy is needed if wellness is to be achieved and maintained  Education then distributed on  the different classes of  medication used for different disorders  as shown by the different diagnosis from this movie  It also showed the importance of supports especially when dealing with a mental illness diagnosis  Gwendolyn Plunkett  was able to learn from this group and will continue to attend wellness groups towards achieving goals and objectives       Tx Plan Objective:1 1,1 2,1 3,1 4
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
## 2019-08-26 NOTE — PSYCH
Subjective:     Patient ID: Lorenza Teresa is a 22 y o  female  [facility] Clinical Progress Notes      Specialized Services Documentation  Therapist must complete separate progress note for each specific clinical activity in which the individual participated during the day  Allied Therapy   [id number] Lorenza Teresa actively shared in [facility] group focused on DBT skill pickard mind  Florencia engaged in tasks exploring differences between reasonable and emotion mind and ways to get to wise mind  Group explored the benefits of mindfulness and practiced ways to slow down to begin to develop pickard mind  Celina Parent asked 'how this really could work for her as she is always thinking about what ifs'  She was a bit more receptive yet definitely skeptical   Group reinforced role of participating with wise mind in order to prevent getting stuck in the past or fears of the future  Some beginning effort toward treatment goal noted  Continue AT to encourage healthy skill development and practice of explored strategies       Tx Plan Objective: 1 1, Therapist:  Nely ARZATE    Education Therapy   Time:  0391-5689  Previous goal met: first treatment day  Readiness to Learning: Receptive  Barriers to Learning: None  Learning Assessment  Time: 9956-5414  Education Completed: Illness, Medication and Wellness Tools  Teaching Method: Verbal and Demonstration  Shared Area of Learning: Yes   Goal Set: try to get comfortable in a new environment  Tx Plan Objective: 1 1, Therapist:  Nely ARZATE

## 2019-08-26 NOTE — PSYCH
Assessment/Plan:      Diagnoses and all orders for this visit:    Bipolar II disorder ([address] )    SHAHRIAR (generalized anxiety disorder)    PTSD (post-traumatic stress disorder)          Subjective:     Patient ID: Ketan Holden is a 22 y o  female  [facility] Treatment Plan   AREAS OF NEED: Increased depression and anxiety as evidenced by low motivation, crying spells, racing thoughts, sleep and appetite difficulties, and increased irritability and agitation due to family and occupational stressors    Date Initiated: 08/26/19     Strengths: "caring, smart"         LONG TERM GOAL:   Date Initiated: 8/26/2019  1 0 I will identify three ways in which my overall mood and functioning as stabilized since attending [facility]  Target Date: 09/23/19  Completion Date:     SHORT TERM OBJECTIVES:     Date Initiated: 8/26/2019  1 1 I will learn three new coping skills and implement one daily in order to cope with my anxiety and depressive symptoms  Revision Date:   Target Date: 09/05/19  Completion Date:     Date Initiated: 8/26/2019  1 2 I will learn two emotional expression techniques in order to develop an understanding of my current struggles and begin to cope with my challenges  Revision Date:   Target Date: 09/05/19  Completion Date:    Date Initiated: 8/26/2019  1 3 I will take medications as prescribed and share questions and concerns if arise  Revision Date:  Target Date: 09/05/19  Completion Date:     Date Initiated: 8/26/2019  1 4 I will identify 3 ways my supports can assist in my recovery and agree to staff/support contact as indicated      Revision Date:  Target Date: 09/05/19  Completion Date:           7 DAY REVISION:    Date Initiated:  Revision Date:   Target Date:   Completion Date:      PSYCHIATRY:  Date Initiated:  8/26/2019  Medication Management and Education       Revision Date:   The person(s) responsible for carrying out the plan is Kwaku Tirado MD    NURSING:   Date Initiated: 8/26/2019  1 1,1 2,1 3,1 4 This RN will provide daily wellness group five days weekly to educate Dennise Bright on S/S of her diagnoses and medications used in treatment  Revision Date:  The person(s) responsible for carrying out the plan is Magdiel Pearson RN    PSYCHOLOGY:   Date Initiated: 8/26/2019       1 1, 1 2, 1 4 Provide psychotherapy group 5 times per week to allow opportunity for Dennise Bright  to explore stressors and ways of coping  Revision Date:   The person(s) responsible for carrying out the plan is Kalen Llanos [place], [place]    ALLIED THERAPY:   Date Initiated: 8/26/2019  1 1,1 2 Engage Dennise Bright in AT group 5 times daily to encourage development and use of wellness tools to decrease symptoms and promote recovery through meaningful activity  Revision Date:   The person(s) responsible for carrying out the plan is HUEY Schofield     CASE MANAGEMENT:   Date Initiated: 8/26/2019      1 0 This  will meet with Dennise Bright  3-4 times weekly to assess treatment progress, discharge planning, connection to community supports and UR as indicated  Revision Date:   The person(s) responsible for carrying out the plan is Waleska Ramírez MA, LPC    TREATMENT REVIEW/COMMENTS:     DISCHARGE CRITERIA: Identify 3 signs of progress and complete relapse prevention plan  DISCHARGE PLAN: Medication management and outpatient therapy  Estimated Length of Stay: 10 treatment days            Diagnosis and Treatment Plan explained to Alinatate Dwayne relates understanding diagnosis and is agreeable to Treatment Plan           CLIENT COMMENTS / Please share your thoughts, feelings, need and/or experiences regarding your treatment plan: _____________________________________________________________________________________________________________________________________________________________________________________________________________________________________________________________________________________________________________________ Date/Time: ______________     Patient Signature: _________________________________     Date/Time: ______________      Signature: _________________________________    Date/Time: ______________

## 2019-08-27 ENCOUNTER — OFFICE VISIT (OUTPATIENT)
Dept: PSYCHOLOGY | Facility: CLINIC | Age: 24
End: 2019-08-27
Payer: COMMERCIAL

## 2019-08-27 VITALS
SYSTOLIC BLOOD PRESSURE: 107 MMHG | HEART RATE: 78 BPM | DIASTOLIC BLOOD PRESSURE: 60 MMHG | TEMPERATURE: 97.8 F | RESPIRATION RATE: 16 BRPM

## 2019-08-27 DIAGNOSIS — F43.10 PTSD (POST-TRAUMATIC STRESS DISORDER): ICD-10-CM

## 2019-08-27 DIAGNOSIS — F31.81 BIPOLAR II DISORDER (HCC): Primary | ICD-10-CM

## 2019-08-27 DIAGNOSIS — F41.1 GAD (GENERALIZED ANXIETY DISORDER): ICD-10-CM

## 2019-08-27 PROCEDURE — H0035 MH PARTIAL HOSP TX UNDER 24H: HCPCS

## 2019-08-27 NOTE — PSYCH
Subjective:     Patient ID: Dunia Olguin is a 22 y o  female  [facility] Clinical Progress Notes      Specialized Services Documentation  Therapist must complete separate progress note for each specific clinical activity in which the individual participated during the day  Group Psychotherapy     ([id number]) Dunia Olguin participated in group psychotherapy process today  Clients checked in, shared how they were feeling, and a unique thing about them  Clients engaged in an expressive activity focusing on emotions  Clients spent time creatively expressing emotions such as lost, fear, regret and inspiration  Clients shared their expressive pieces  Mesfin Wang was engaged and involved in the group activity  Mesfin Wang made progress towards goals today through group participation and is encouraged to continue participating to progress towards long term goals  TX Goals: 1 1, 1 2, Therapist: Anette Silva MA, LPC    Other     Case Management Note    Waleska Ramírez LPC    Current suicide risk : Low     ([id number]) Met with Florencia and reviewed her treatment plan  Agreed to plan and the treatment interventions  Shared still struggling with depressive moods and feeling "off" today  Encouraged Florencia to do something productive or pleasant after program and not go directly home  Medications changes/added/denied? No    Treatment session number: 2    Individual Case Management Visit provided today?  Yes     [facility] follow up physician's orders: None

## 2019-08-27 NOTE — PSYCH
Subjective:     Patient ID: Carole Perez is a 22 y o  female  [facility] Clinical Progress Notes      Specialized Services Documentation  Therapist must complete separate progress note for each specific clinical activity in which the individual participated during the day  Group Psychotherapy       ([id number]) Yosi Castano RN     Carole Perez attended wellness group today on Metabolic Syndrome and What You Need To Know:  Discussion began with knowing what is metabolic syndrome? Metabolic syndrome is a group of medical conditions that can increase you risk for heart disease, stroke, or type 2 diabetes  You may have metabolic syndrome if you have at least 3 of the following medical conditions:  1 ) High triglycerides  2) Low HDL cholesterol (good cholesterol)  3) High blood pressure  4) Extra abdominal fat  Discussion then focused on signs and symptoms of metabolic syndrome: most people with metabolic syndrome do not have any signs or symptoms  Symptoms that can occur : increased thirst or hunger, urinate more often, or have blurred vision or headaches  How is metabolic syndrome diagnosed? Your health care provider will examine you and ask about other medical conditions you may have and will ask about your family history of diabetes, obesity or heart disease  Discussion continued on the blood tests that will be ordered to evaluate for metabolic syndrome (Glucose and cholesterol levels)  Discussion then focused on how metabolic syndrome is treated: Cholesterol medication, blood pressure medications, and hypoglycemic medications  Manage a healthy weight, eat a variety of healthy foods, exercise, limit alcohol, do not smoke and check your blood pressure as directed  ASHLEY Gonzalez was able to understand the material presented and will continue to attend wellness groups toward achieving goals and objectives         Tx Plan Objective: 1 1,1 2,1 3

## 2019-08-27 NOTE — TELEPHONE ENCOUNTER
Thank you for the msg Dr Keyshawn Lawson  I called Yobani Perkins now and there was not answer  I will try again tomorrow but encouraged to call the nurse line and give us an update

## 2019-08-27 NOTE — PSYCH
Subjective:     Patient ID: Gisele Sierra is a 22 y o  female  [facility] Clinical Progress Notes      Specialized Services Documentation  Therapist must complete separate progress note for each specific clinical activity in which the individual participated during the day  Allied Therapy     [id number]  Gisele Sierra  actively shared in [facility] ([place], [place]) group focused on distress tolerance skill self-soothe  Jamie Khalil was observed to be engaged in therapist led relaxation exercises  She sat outside of the [place] and continues to be somewhat isolating to ideas and group (however once group starts she is more receptive)  Group discussed specific items that could help self soothe if in an anxiety crisis with encouragement to use these things regularly to manage stressors consistently  She identified she would put a stuffed animal  in  a self soothe kit  Some beginning effort noted toward tx goal   Continue AT to encourage development of wellness skills and consistent practice        Tx Plan Objective: 1 1, Therapist:  Jes ARZATE    Education Therapy   Time:  0409-1952  Previous goal met: Yes   Readiness to Learning: Receptive  Barriers to Learning: None  Learning Assessment  Time: 7550-5725  Education Completed: Illness and Wellness Tools  Teaching Method: Verbal, Written and Demonstration  Shared Area of Learning: Yes   Goal Set: do something productive rather than just go to bed  Tx Plan Objective: 1 1, Therapist:  Jes ARZATE

## 2019-08-28 ENCOUNTER — TELEPHONE (OUTPATIENT)
Dept: PSYCHOLOGY | Facility: CLINIC | Age: 24
End: 2019-08-28

## 2019-08-28 ENCOUNTER — DOCUMENTATION (OUTPATIENT)
Dept: PSYCHOLOGY | Facility: CLINIC | Age: 24
End: 2019-08-28

## 2019-08-28 NOTE — PROGRESS NOTES
Subjective:     Patient ID: Marlon Felix is a 22 y o  female  [facility] Clinical Progress Notes      Specialized Services Documentation  Therapist must complete separate progress note for each specific clinical activity in which the individual participated during the day  Case Management Note    Evert Buerger, LPC    Current suicide risk : Unable to assess due to absence  Marlon Felix contacted the program and stated they were not going to be present due to oversleeping but will be present the following day  Medications changes/added/denied? No    Treatment session number: N/A    Individual Case Management Visit provided today? No    [facility] follow up physician's orders: None at this time

## 2019-08-29 ENCOUNTER — OFFICE VISIT (OUTPATIENT)
Dept: PSYCHOLOGY | Facility: CLINIC | Age: 24
End: 2019-08-29
Payer: COMMERCIAL

## 2019-08-29 DIAGNOSIS — F43.10 PTSD (POST-TRAUMATIC STRESS DISORDER): ICD-10-CM

## 2019-08-29 DIAGNOSIS — F41.1 GAD (GENERALIZED ANXIETY DISORDER): ICD-10-CM

## 2019-08-29 DIAGNOSIS — F31.81 BIPOLAR II DISORDER (HCC): Primary | ICD-10-CM

## 2019-08-29 PROCEDURE — H0035 MH PARTIAL HOSP TX UNDER 24H: HCPCS

## 2019-08-29 NOTE — PSYCH
Subjective:     Patient ID: Gisele Sierra is a 22 y o  female  [facility] Clinical Progress Notes      Specialized Services Documentation  Therapist must complete separate progress note for each specific clinical activity in which the individual participated during the day  Allied Therapy   [id number] Gisele Sierra actively shared in [facility] group focused DBT Module Interpersonal Effectiveness and [facility] skill  Engaged in tasks exploring what makes it difficult to share and strategies to improve with supports  She participated in discussion related to communication roadblocks  She was an active participant as group worked together to practice writing out a meaningful interaction using [facility]  Jamie Khalil continues to be contrary and challenging quietly - ie  Sitting right in front of the white board and when asked to move just shifting her seat a few inches  Slow progress toward goal noted  Continue AT to encourage sharing, personal advocacy and practice of wellness tools         Tx Plan Objective: 1 1,1 2, Therapist:  Jes ARZATE    Education Therapy   Time:  2752-8615  Previous goal met: Yes   Readiness to Learning: Receptive  Barriers to Learning: None  Learning Assessment  Time: 6979-7924  Education Completed: Illness and Wellness Tools  Teaching Method: Verbal and Written  Shared Area of Learning: Yes   Goal Set: refused to set a goal  Tx Plan Objective: na, Therapist:  Jes ARZATE

## 2019-08-29 NOTE — PSYCH
Subjective:     Patient ID: Keyshawn Lancaster is a 22 y o  female  [facility] Clinical Progress Notes      Specialized Services Documentation  Therapist must complete separate progress note for each specific clinical activity in which the individual participated during the day  Group Psychotherapy     ([id number]) SANDRA Valenzuela  Keyshawn Lancaster attended wellness/education group today : Zahida  Different areas discussed were:  (1) Myth or fact about mental health  (2) Sign/symptoms   (3) Treatment options and medications used to treat different diagnosis  (4)  Random facts about metal health  (5) Supports groups for the different diagnosis  (6) Coping Skills   (7) Self care      Florencia was able to actively participate in group discussion  after each category explored and will continue to attend education and wellness group to work towards achieving goals and objectives         Tx Plan Objective: 1 1,1 2,1 3,1 4

## 2019-08-29 NOTE — PSYCH
Subjective:     Patient ID: Ketan Holden is a 22 y o  female  [facility] Clinical Progress Notes      Specialized Services Documentation  Therapist must complete separate progress note for each specific clinical activity in which the individual participated during the day  Group Psychotherapy     ([id number]) Ketan Holden participated in psychotherapy process group today  This writer facilitated a guided meditation focusing on a body scan  Clients checked in, shared how they were feeling, and what they find most valuable in life  Clients participated in a values activity focusing on sorting values and shared how they were or were not living a values-based life  Clients shared barriers to living a values-based life  Clients engaged in an open discussion on how the steps they are taking towards wellness have aligned with their values  Florencia shared how independence and autonomy has brought her to get herself help and be enrolled in the program   She continues to make progress towards goals by participating in group psychotherapy and is encouraged to continue to progress towards long term goals  Tx Plan Objective: 1 1, 1 2 Therapist:  Pablo Johnson, [facility] [place], [place]      Other     Case Management Note    Waleska Ramírez, [facility]    Current suicide risk : Low     ([id number])  Met with Florencia and discussed progress and absence  Identified continued struggles with family and being told she is not maing progress which reinforces her own negative thoughts  Identified importance of keeping herself enrolled in program and that the two days she was here she felt like she was improving  Florencia discussed wellness tools she can use to take care of herself  She was encouraged to  her medications due to the upcoming holiday  Medications changes/added/denied? No    Treatment session number: 3    Individual Case Management Visit provided today?  Yes     [facility] follow up physician's orders: None at this time

## 2019-08-30 ENCOUNTER — OFFICE VISIT (OUTPATIENT)
Dept: PSYCHOLOGY | Facility: CLINIC | Age: 24
End: 2019-08-30
Payer: COMMERCIAL

## 2019-08-30 DIAGNOSIS — F41.1 GAD (GENERALIZED ANXIETY DISORDER): ICD-10-CM

## 2019-08-30 DIAGNOSIS — F43.10 PTSD (POST-TRAUMATIC STRESS DISORDER): ICD-10-CM

## 2019-08-30 DIAGNOSIS — F31.81 BIPOLAR II DISORDER (HCC): Primary | ICD-10-CM

## 2019-08-30 PROCEDURE — H0035 MH PARTIAL HOSP TX UNDER 24H: HCPCS

## 2019-08-30 NOTE — PSYCH
Subjective:     Patient ID: Nisha Lockhart is a 22 y o  female  [facility] Clinical Progress Notes      Specialized Services Documentation  Therapist must complete separate progress note for each specific clinical activity in which the individual participated during the day  Group Psychotherapy     ([id number]) Nisha Lockhart participated in group psychotherapy process today cofacilitated by psychiatric RN  Clients engaged in a check in and discussed how they were feeling as well as a pleasant and productive activity they have planned for the extended weekend  Clients participated in an open discussion on intellectual wellness and activities they can do that promoted intellectual wellness  Clients engaged in an activity on intellectual wellness that promoted discussion and problem solving  Charbel Ayala was open in discussion and advocated for her personal beliefs  She made progress towards goals through group participation and is encouraged to continue progressing towards long term goals through program compliance and participation  Tx Plan Objective: 1 1 , 1 2  Therapist:  Radu Nelson LPC and Kunal Stokes RN      Education Therapy   Time:  3400-0184  Previous goal met: Yes   Readiness to Learning: Receptive  Barriers to Learning: None  Learning Assessment  Time: 6091-9271  Education Completed: Medication, Aftercare and Wellness Tools  Teaching Method: Verbal, Written and Demonstration  Shared Area of Learning: Yes   Goal Set: "Journal and find an active outlet for my emotions"  Tx Plan Objective: 1 4 Therapist:  Radu Nelson [facility] [place], [place]    Other     Case Management Note    Radu Nelson LPC    Current suicide risk : Low     ([id number]) Met with Florencia and processed current stressors going on  Florencia emotionally expressed herself regarding her current situation with her father and how his mental health effects her mental health    Discussed her struggles with remaining optimistic when she continues to be in a difficult environment  Identified prolonged avoidance and how it maintains her depression and she identified she knows that's her pattern  Identified different strategies she can try over the weekend and she shared she would open herself up to them  Medications changes/added/denied? No    Treatment session number: 4    Individual Case Management Visit provided today?  Yes     [facility] follow up physician's orders: None at this time

## 2019-08-30 NOTE — PSYCH
Subjective:     Patient ID: Inna Nieves is a 22 y o  female  [facility] Clinical Progress Notes      Specialized Services Documentation  Therapist must complete separate progress note for each specific clinical activity in which the individual participated during the day  Group Psychotherapy     ([id number])  Luisana Flowers RN and Horacio Cordova MA, [address] [place] [address] attended- : weekly  wellness assessment group today  Reviewing weekend supports and plan as we approach the weekend  Medications reviewed with  no concerns or questions at this time  Compliance of medications reviewed and understood  The six (6) dimension of wellness discussed (Physical, Emotional, Cognitive, Vocational, Social and Spiritual)  The format for assessing wellness and measuring progress   reviewed   Questions asked :  (1) I intend to improve in this area by    (2) My first step will be   (3) I will share my plans with Emily Rincon ask for their support by saying    Nael Villa stated that she needs to feel more comfortable when asking for help as part of her wellness assessment  Nael Villa is making  progress towards goals and objectives and will continue to attend wellness group       Tx Plan Objective: 1 1,1 2,1 3,1 4

## 2019-09-03 ENCOUNTER — HOSPITAL ENCOUNTER (EMERGENCY)
Facility: HOSPITAL | Age: 24
Discharge: HOME | End: 2019-09-03
Attending: EMERGENCY MEDICINE | Admitting: EMERGENCY MEDICINE
Payer: COMMERCIAL

## 2019-09-03 ENCOUNTER — DOCUMENTATION (OUTPATIENT)
Dept: PSYCHOLOGY | Facility: CLINIC | Age: 24
End: 2019-09-03

## 2019-09-03 ENCOUNTER — OFFICE VISIT (OUTPATIENT)
Dept: PSYCHOLOGY | Facility: CLINIC | Age: 24
End: 2019-09-03
Payer: COMMERCIAL

## 2019-09-03 VITALS
DIASTOLIC BLOOD PRESSURE: 50 MMHG | TEMPERATURE: 97.9 F | RESPIRATION RATE: 16 BRPM | SYSTOLIC BLOOD PRESSURE: 80 MMHG | HEART RATE: 60 BPM

## 2019-09-03 VITALS
OXYGEN SATURATION: 99 % | RESPIRATION RATE: 18 BRPM | SYSTOLIC BLOOD PRESSURE: 89 MMHG | DIASTOLIC BLOOD PRESSURE: 44 MMHG | HEART RATE: 54 BPM

## 2019-09-03 DIAGNOSIS — I95.89 HYPOTENSION DUE TO HYPOVOLEMIA: Primary | ICD-10-CM

## 2019-09-03 DIAGNOSIS — E86.1 HYPOTENSION DUE TO HYPOVOLEMIA: Primary | ICD-10-CM

## 2019-09-03 LAB
ANION GAP SERPL CALCULATED.3IONS-SCNC: 4 MMOL/L (ref 4–13)
ATRIAL RATE: 55 BPM
BACTERIA UR QL AUTO: ABNORMAL /HPF
BASOPHILS # BLD AUTO: 0.02 THOUSANDS/ΜL (ref 0–0.1)
BASOPHILS NFR BLD AUTO: 0 % (ref 0–1)
BILIRUB UR QL STRIP: ABNORMAL
BUN SERPL-MCNC: 7 MG/DL (ref 5–25)
CALCIUM SERPL-MCNC: 7.3 MG/DL (ref 8.3–10.1)
CHLORIDE SERPL-SCNC: 110 MMOL/L (ref 100–108)
CLARITY UR: CLEAR
CO2 SERPL-SCNC: 23 MMOL/L (ref 21–32)
COLOR UR: YELLOW
COLOR, POC: NORMAL
CREAT SERPL-MCNC: 0.74 MG/DL (ref 0.6–1.3)
EOSINOPHIL # BLD AUTO: 0.19 THOUSAND/ΜL (ref 0–0.61)
EOSINOPHIL NFR BLD AUTO: 3 % (ref 0–6)
ERYTHROCYTE [DISTWIDTH] IN BLOOD BY AUTOMATED COUNT: 12.2 % (ref 11.6–15.1)
EXT PREG TEST URINE: NEGATIVE
EXT. CONTROL ED NAV: NORMAL
GFR SERPL CREATININE-BSD FRML MDRD: 115 ML/MIN/1.73SQ M
GLUCOSE SERPL-MCNC: 79 MG/DL (ref 65–140)
GLUCOSE UR STRIP-MCNC: NEGATIVE MG/DL
HCT VFR BLD AUTO: 35.6 % (ref 34.8–46.1)
HGB BLD-MCNC: 11.8 G/DL (ref 11.5–15.4)
HGB UR QL STRIP.AUTO: NEGATIVE
IMM GRANULOCYTES # BLD AUTO: 0.01 THOUSAND/UL (ref 0–0.2)
IMM GRANULOCYTES NFR BLD AUTO: 0 % (ref 0–2)
KETONES UR STRIP-MCNC: NEGATIVE MG/DL
LEUKOCYTE ESTERASE UR QL STRIP: NEGATIVE
LYMPHOCYTES # BLD AUTO: 2.96 THOUSANDS/ΜL (ref 0.6–4.47)
LYMPHOCYTES NFR BLD AUTO: 47 % (ref 14–44)
MCH RBC QN AUTO: 31.9 PG (ref 26.8–34.3)
MCHC RBC AUTO-ENTMCNC: 33.1 G/DL (ref 31.4–37.4)
MCV RBC AUTO: 96 FL (ref 82–98)
MONOCYTES # BLD AUTO: 0.37 THOUSAND/ΜL (ref 0.17–1.22)
MONOCYTES NFR BLD AUTO: 6 % (ref 4–12)
MUCOUS THREADS UR QL AUTO: ABNORMAL
NEUTROPHILS # BLD AUTO: 2.81 THOUSANDS/ΜL (ref 1.85–7.62)
NEUTS SEG NFR BLD AUTO: 44 % (ref 43–75)
NITRITE UR QL STRIP: NEGATIVE
NON-SQ EPI CELLS URNS QL MICRO: ABNORMAL /HPF
NRBC BLD AUTO-RTO: 0 /100 WBCS
P AXIS: 75 DEGREES
PH UR STRIP.AUTO: 7 [PH] (ref 4.5–8)
PLATELET # BLD AUTO: 153 THOUSANDS/UL (ref 149–390)
PMV BLD AUTO: 11.6 FL (ref 8.9–12.7)
POTASSIUM SERPL-SCNC: 3.6 MMOL/L (ref 3.5–5.3)
PR INTERVAL: 144 MS
PROT UR STRIP-MCNC: ABNORMAL MG/DL
QRS AXIS: 86 DEGREES
QRSD INTERVAL: 78 MS
QT INTERVAL: 408 MS
QTC INTERVAL: 390 MS
RBC # BLD AUTO: 3.7 MILLION/UL (ref 3.81–5.12)
RBC #/AREA URNS AUTO: ABNORMAL /HPF
SODIUM SERPL-SCNC: 137 MMOL/L (ref 136–145)
SP GR UR STRIP.AUTO: 1.02 (ref 1–1.03)
T WAVE AXIS: 56 DEGREES
UROBILINOGEN UR QL STRIP.AUTO: 1 E.U./DL
VENTRICULAR RATE: 55 BPM
WBC # BLD AUTO: 6.36 THOUSAND/UL (ref 4.31–10.16)
WBC #/AREA URNS AUTO: ABNORMAL /HPF

## 2019-09-03 PROCEDURE — 99285 EMERGENCY DEPT VISIT HI MDM: CPT

## 2019-09-03 PROCEDURE — 36415 COLL VENOUS BLD VENIPUNCTURE: CPT | Performed by: EMERGENCY MEDICINE

## 2019-09-03 PROCEDURE — 96360 HYDRATION IV INFUSION INIT: CPT

## 2019-09-03 PROCEDURE — 81025 URINE PREGNANCY TEST: CPT | Performed by: EMERGENCY MEDICINE

## 2019-09-03 PROCEDURE — 93005 ELECTROCARDIOGRAM TRACING: CPT

## 2019-09-03 PROCEDURE — 99284 EMERGENCY DEPT VISIT MOD MDM: CPT | Performed by: EMERGENCY MEDICINE

## 2019-09-03 PROCEDURE — 85025 COMPLETE CBC W/AUTO DIFF WBC: CPT | Performed by: EMERGENCY MEDICINE

## 2019-09-03 PROCEDURE — 80048 BASIC METABOLIC PNL TOTAL CA: CPT | Performed by: EMERGENCY MEDICINE

## 2019-09-03 PROCEDURE — 81001 URINALYSIS AUTO W/SCOPE: CPT

## 2019-09-03 PROCEDURE — 96361 HYDRATE IV INFUSION ADD-ON: CPT

## 2019-09-03 PROCEDURE — 93010 ELECTROCARDIOGRAM REPORT: CPT | Performed by: INTERNAL MEDICINE

## 2019-09-03 RX ORDER — ONDANSETRON 2 MG/ML
1 INJECTION INTRAMUSCULAR; INTRAVENOUS ONCE
Status: COMPLETED | OUTPATIENT
Start: 2019-09-03 | End: 2019-09-03

## 2019-09-03 RX ADMIN — SODIUM CHLORIDE 1000 ML: 0.9 INJECTION, SOLUTION INTRAVENOUS at 10:37

## 2019-09-03 NOTE — ED ATTENDING ATTESTATION
Guillermo Davidson MD, saw and evaluated the patient  All available labs and X-rays were ordered by me or the resident and have been reviewed by myself  I discussed the patient with the resident / non-physician and agree with the resident's / non-physician practitioner's findings and plan as documented in the resident's / non-physician practicitioner's note, except where noted  At this point, I agree with the current assessment done in the ED  I was present during key portions of all procedures performed unless otherwise stated  Chief Complaint   Patient presents with    Hypotension     Pt was at physician office today and was found to be hypotensive in the 60s  Pt c/o feeling dizzy and lethargic on arrival to ED  This is a 26-year-old female presenting for evaluation of hypotension  The patient for last 2 or 3 days has not been eating normally  She normally has very low blood pressure in the 90s to 100  Today she was at her [facility] psychiatric facility office visit  She was asymptomatic but had a blood pressure of 60 systolic  Repeated 3 times via a right upper extremity measurement  No fevers chills nausea vomiting at the time  She states that she has had decreased p o  Intake though  She says that this because of her depression which her psychiatrist is evaluating her for  She was started a new medication  Of Soma within the past few weeks but does not think it is related  No urinary symptoms including burning itching pain blood frequency  No chest pain shortness of breath  No actual syncopal episodes  She felt a little bit dizzy at times but nothing extremely unusual   No vaginal symptoms  Denies past medical problems apart from psychiatric illness  No surgeries  No smoking drinking drugs  No recent travel long drives    PE:  Vitals:    09/03/19 1000 09/03/19 1030 09/03/19 1130 09/03/19 1215   BP: (!) 89/54 98/57 91/50 (!) 89/44   BP Location: Right arm Right arm Right arm Right arm   Pulse: 68 62 (!) 54 (!) 54   Resp: 18 20 20 18   SpO2: 100% 100% 100% 99%   General: VSS, NAD, awake, alert  Well-nourished, well-developed  Appears stated age  Speaking normally in full sentences  Head: Normocephalic, atraumatic, nontender  Eyes: PERRL, EOM-I  No diplopia  No hyphema  No subconjunctival hemorrhages  Symmetrical lids  ENT: Atraumatic external nose and ears  Dry MM  No malocclusion  No stridor  Normal phonation  No drooling  Normal swallowing  Neck: Symmetric, trachea midline  No JVD  CV: RRR  +S1/S2  No murmurs or gallops  Peripheral pulses +2 throughout  No chest wall tenderness  Lungs:   Unlabored No retractions  CTAB, lungs sounds equal bilateral    No tachypnea  Abd: +BS, soft, NT/ND    MSK:   FROM   Back:   No rashes  Skin: Dry, intact  Neuro: AAOx3, GCS 15, CN II-XII grossly intact  Motor grossly intact  Psychiatric/Behavioral: Appropriate mood and affect   Exam: deferred  A:  - Dehydration poor oral intake  P:  - IVF  - labs  - Outpatient management: no SI HI AH VH  - 13 point ROS was performed and all are normal unless stated in the history above  - Nursing note reviewed  Vitals reviewed  - Orders placed by myself and/or advanced practitioner / resident     - Previous chart was reviewed  - No language barrier    - History obtained from patient  - There are no limitations to the history obtained  - Critical care time: Not applicable for this patient  Final Diagnosis:  1  Hypotension due to hypovolemia        ED Course as of Sep 03 1543   Tue Sep 03, 2019   1034 Blood Pressure: 101/56   1216 Consistent with dehydration / poor oral intake      MUCUS THREADS(!): Innumerable     Medications   ondansetron (FOR EMS ONLY) (ZOFRAN) 4 mg/2 mL injection 4 mg (0 mg Does not apply Given to EMS 9/3/19 5605)   sodium chloride 0 9 % bolus 1,000 mL (0 mL Intravenous Stopped 9/3/19 1242)     No orders to display     Orders Placed This Encounter   Procedures  CBC and differential    Basic metabolic panel    Urine Microscopic    EKG RESULTS    POCT pregnancy, urine    POCT urinalysis dipstick    ECG 12 lead    ECG 12 lead     Labs Reviewed   CBC AND DIFFERENTIAL - Abnormal       Result Value Ref Range Status    WBC 6 36  4 31 - 10 16 Thousand/uL Final    RBC 3 70 (*) 3 81 - 5 12 Million/uL Final    Hemoglobin 11 8  11 5 - 15 4 g/dL Final    Hematocrit 35 6  34 8 - 46 1 % Final    MCV 96  82 - 98 fL Final    MCH 31 9  26 8 - 34 3 pg Final    MCHC 33 1  31 4 - 37 4 g/dL Final    RDW 12 2  11 6 - 15 1 % Final    MPV 11 6  8 9 - 12 7 fL Final    Platelets 299  750 - 390 Thousands/uL Final    nRBC 0  /100 WBCs Final    Neutrophils Relative 44  43 - 75 % Final    Immat GRANS % 0  0 - 2 % Final    Lymphocytes Relative 47 (*) 14 - 44 % Final    Monocytes Relative 6  4 - 12 % Final    Eosinophils Relative 3  0 - 6 % Final    Basophils Relative 0  0 - 1 % Final    Neutrophils Absolute 2 81  1 85 - 7 62 Thousands/µL Final    Immature Grans Absolute 0 01  0 00 - 0 20 Thousand/uL Final    Lymphocytes Absolute 2 96  0 60 - 4 47 Thousands/µL Final    Monocytes Absolute 0 37  0 17 - 1 22 Thousand/µL Final    Eosinophils Absolute 0 19  0 00 - 0 61 Thousand/µL Final    Basophils Absolute 0 02  0 00 - 0 10 Thousands/µL Final   BASIC METABOLIC PANEL - Abnormal    Sodium 137  136 - 145 mmol/L Final    Potassium 3 6  3 5 - 5 3 mmol/L Final    Comment: Slightly Hemolyzed; Results May be Affected    Chloride 110 (*) 100 - 108 mmol/L Final    CO2 23  21 - 32 mmol/L Final    ANION GAP 4  4 - 13 mmol/L Final    BUN 7  5 - 25 mg/dL Final    Creatinine 0 74  0 60 - 1 30 mg/dL Final    Comment: Standardized to IDMS reference method    Glucose 79  65 - 140 mg/dL Final    Comment:   If the patient is fasting, the ADA then defines impaired fasting glucose as > 100 mg/dL and diabetes as > or equal to 123 mg/dL    Specimen collection should occur prior to Sulfasalazine administration due to the potential for falsely depressed results  Specimen collection should occur prior to Sulfapyridine administration due to the potential for falsely elevated results  Calcium 7 3 (*) 8 3 - 10 1 mg/dL Final    eGFR 115  ml/min/1 73sq m Final    Narrative:     [place] guidelines for Chronic Kidney Disease (CKD):     Stage 1 with normal or high GFR (GFR > 90 mL/min/1 73 square meters)    Stage 2 Mild CKD (GFR = 60-89 mL/min/1 73 square meters)    Stage 3A Moderate CKD (GFR = 45-59 mL/min/1 73 square meters)    Stage 3B Moderate CKD (GFR = 30-44 mL/min/1 73 square meters)    Stage 4 Severe CKD (GFR = 15-29 mL/min/1 73 square meters)    Stage 5 End Stage CKD (GFR <15 mL/min/1 73 square meters)  Note: GFR calculation is accurate only with a steady state creatinine   URINE MICROSCOPIC - Abnormal    RBC, UA None Seen  None Seen, 0-5 /hpf Final    WBC, UA 4-10 (*) None Seen, 0-5, 5-55, 5-65 /hpf Final    Epithelial Cells Occasional  None Seen, Occasional /hpf Final    Bacteria, UA Moderate (*) None Seen, Occasional /hpf Final    MUCUS THREADS Innumerable (*) None Seen Final   ED URINE MACROSCOPIC - Abnormal    Color, UA Yellow   Final    Clarity, UA Clear   Final    pH, UA 7 0  4 5 - 8 0 Final    Leukocytes, UA Negative  Negative Final    Nitrite, UA Negative  Negative Final    Protein, UA 30 (1+) (*) Negative mg/dl Final    Glucose, UA Negative  Negative mg/dl Final    Ketones, UA Negative  Negative mg/dl Final    Urobilinogen, UA 1 0  0 2, 1 0 E U /dl E U /dl Final    Bilirubin, UA Interference- unable to analyze (*) Negative Final    Comment: The dipstick result may be falsely positive due to interfering substances  We recommend reliance upon serum bilirubin, liver & kidney function tests to guide patient care if clinically indicated      Blood, UA Negative  Negative Final    Specific Gravity, UA 1 020  1 003 - 1 030 Final    Narrative:     CLINITEK RESULT   POCT PREGNANCY, URINE - Normal EXT PREG TEST UR (Ref: Negative) Negative   Final    Control -   Final   POCT URINALYSIS DIPSTICK - Normal    Color, UA -   Final     Time reflects when diagnosis was documented in both MDM as applicable and the Disposition within this note     Time User Action Codes Description Comment    9/3/2019 11:19 AM Natalie Palomino Add [I95 89,  E86 1] Hypotension due to hypovolemia       ED Disposition     ED Disposition Condition Date/Time Comment    Discharge Stable Tue Sep 3, 2019 11:19 AM Melinda Parker discharge to home/self care              Follow-up Information     Follow up With Specialties Details Why Contact Info Additional Information    González Sethi DO Internal Medicine Schedule an appointment as soon as possible for a visit in 3 days  [address]        [address] Emergency Department Emergency Medicine Go to  If symptoms worsen [address] [facility] ED, [address], [address]        Discharge Medication List as of 9/3/2019 11:31 AM      CONTINUE these medications which have NOT CHANGED    Details   albuterol (PROVENTIL HFA,VENTOLIN HFA) 90 mcg/act inhaler Inhale 1 puff every 4 (four) hours as needed for shortness of breath, Starting Sun 8/18/2019, Historical Med      carisoprodol (SOMA) 350 mg tablet Take 350 mg by mouth 2 (two) times a day, Starting Tue 8/20/2019, Historical Med      Certolizumab Pegol (CIMZIA SC) Inject under the skin 2 syringes every 4 weeks, Historical Med      clonazePAM (KlonoPIN) 0 5 mg tablet Take 0 5 mg by mouth 2 (two) times a day as needed, Historical Med      ergocalciferol (VITAMIN D2) 50,000 units Take 1 capsule by mouth once a week, Starting Mon 7/29/2019, Historical Med      ketorolac (TORADOL) 10 mg tablet Take 10 mg by mouth 3 (three) times a day, Starting Tue 8/20/2019, Historical Med      magnesium oxide (MAG-OX) 400 mg Take 1 tablet (400 mg total) by mouth daily, Starting Mon 7/15/2019, Normal      Norgestrel-Ethinyl Estradiol (CRYSELLE-28 PO) Take 1 tablet by mouth daily  , Until Discontinued, Historical Med      omeprazole (PriLOSEC) 40 MG capsule Take 40 mg by mouth daily, Until Discontinued, Historical Med      oxyCODONE-acetaminophen (PERCOCET) 5-325 mg per tablet Take 1 tablet by mouth every 4 (four) hours as needed for moderate pain, Until Discontinued, Historical Med      prochlorperazine (COMPAZINE) 10 mg tablet Take 1 tablet (10 mg total) by mouth every 6 (six) hours as needed for nausea or vomiting, Starting Mon 7/15/2019, Normal      rizatriptan (MAXALT) 10 MG tablet Take 1 tablet (10 mg total) by mouth once as needed for migraine May repeat in 2 hours if needed  Max 2/24 hours, 9/month , Starting Mon 7/15/2019, Normal      tiZANidine (ZANAFLEX) 4 mg tablet Take 4 mg by mouth daily at bedtime, Starting Mon 6/24/2019, Historical Med      ziprasidone (GEODON) 40 mg capsule Take 1 capsule (40 mg total) by mouth daily after dinner, Starting Mon 8/26/2019, Normal      zolpidem (AMBIEN) 5 mg tablet Take 5 mg by mouth daily at bedtime as needed for sleep, Historical Med           No discharge procedures on file  Prior to Admission Medications   Prescriptions Last Dose Informant Patient Reported? Taking? Certolizumab Pegol (CIMZIA SC)   Yes No   Sig: Inject under the skin 2 syringes every 4 weeks   Norgestrel-Ethinyl Estradiol (CRYSELLE-28 PO)   Yes No   Sig: Take 1 tablet by mouth daily     albuterol (PROVENTIL HFA,VENTOLIN HFA) 90 mcg/act inhaler   Yes No   Sig: Inhale 1 puff every 4 (four) hours as needed for shortness of breath   carisoprodol (SOMA) 350 mg tablet   Yes No   Sig: Take 350 mg by mouth 2 (two) times a day   clonazePAM (KlonoPIN) 0 5 mg tablet   Yes No   Sig: Take 0 5 mg by mouth 2 (two) times a day as needed   ergocalciferol (VITAMIN D2) 50,000 units   Yes No   Sig: Take 1 capsule by mouth once a week   ketorolac (TORADOL) 10 mg tablet   Yes No   Sig: Take 10 mg by mouth 3 (three) times a day   magnesium oxide (MAG-OX) 400 mg   No No   Sig: Take 1 tablet (400 mg total) by mouth daily   omeprazole (PriLOSEC) 40 MG capsule   Yes No   Sig: Take 40 mg by mouth daily   oxyCODONE-acetaminophen (PERCOCET) 5-325 mg per tablet   Yes No   Sig: Take 1 tablet by mouth every 4 (four) hours as needed for moderate pain   prochlorperazine (COMPAZINE) 10 mg tablet   No No   Sig: Take 1 tablet (10 mg total) by mouth every 6 (six) hours as needed for nausea or vomiting   rizatriptan (MAXALT) 10 MG tablet   No No   Sig: Take 1 tablet (10 mg total) by mouth once as needed for migraine May repeat in 2 hours if needed  Max 2/24 hours, 9/month  tiZANidine (ZANAFLEX) 4 mg tablet   Yes No   Sig: Take 4 mg by mouth daily at bedtime   ziprasidone (GEODON) 40 mg capsule   No No   Sig: Take 1 capsule (40 mg total) by mouth daily after dinner   zolpidem (AMBIEN) 5 mg tablet   Yes No   Sig: Take 5 mg by mouth daily at bedtime as needed for sleep      Facility-Administered Medications: None       Portions of the record may have been created with voice recognition software  Occasional wrong word or "sound a like" substitutions may have occurred due to the inherent limitations of voice recognition software  Read the chart carefully and recognize, using context, where substitutions have occurred      Electronically signed by:  Dawood Kingston

## 2019-09-03 NOTE — ED PROVIDER NOTES
History  Chief Complaint   Patient presents with    Hypotension     Pt was at physician office today and was found to be hypotensive in the 60s  Pt c/o feeling dizzy and lethargic on arrival to ED  81-year-old female outpatient partial psychiatric program after her triage blood work revealed a systolic blood pressure in the 60s  Patient was transported by EMS and they reported her blood pressure to be in the 90 systolic  Patient notes that she has not eaten for the last 2 days secondary to depression  She has been drinking Gatorade and urinating appropriately  Notes feeling lightheaded when she got up this morning  Worse when she sits up  Denies any chest pain, shortness of breath palpitations, or syncope  No recent fevers or chills or viral illnesses  LMP was 2 weeks ago and normal flow  The denies any melena hematochezia  No other complaints on review of systems  No SI or HI  Patient does report that several weeks ago her blood pressures checked at her PCP office was approximately 90/60 at that time  Prior to Admission Medications   Prescriptions Last Dose Informant Patient Reported? Taking? Certolizumab Pegol (CIMZIA SC)   Yes No   Sig: Inject under the skin 2 syringes every 4 weeks   Norgestrel-Ethinyl Estradiol (CRYSELLE-28 PO)   Yes No   Sig: Take 1 tablet by mouth daily     albuterol (PROVENTIL HFA,VENTOLIN HFA) 90 mcg/act inhaler   Yes No   Sig: Inhale 1 puff every 4 (four) hours as needed for shortness of breath   carisoprodol (SOMA) 350 mg tablet   Yes No   Sig: Take 350 mg by mouth 2 (two) times a day   clonazePAM (KlonoPIN) 0 5 mg tablet   Yes No   Sig: Take 0 5 mg by mouth 2 (two) times a day as needed   ergocalciferol (VITAMIN D2) 50,000 units   Yes No   Sig: Take 1 capsule by mouth once a week   ketorolac (TORADOL) 10 mg tablet   Yes No   Sig: Take 10 mg by mouth 3 (three) times a day   magnesium oxide (MAG-OX) 400 mg   No No   Sig: Take 1 tablet (400 mg total) by mouth daily   omeprazole (PriLOSEC) 40 MG capsule   Yes No   Sig: Take 40 mg by mouth daily   oxyCODONE-acetaminophen (PERCOCET) 5-325 mg per tablet   Yes No   Sig: Take 1 tablet by mouth every 4 (four) hours as needed for moderate pain   prochlorperazine (COMPAZINE) 10 mg tablet   No No   Sig: Take 1 tablet (10 mg total) by mouth every 6 (six) hours as needed for nausea or vomiting   rizatriptan (MAXALT) 10 MG tablet   No No   Sig: Take 1 tablet (10 mg total) by mouth once as needed for migraine May repeat in 2 hours if needed  Max 2/24 hours, 9/month  tiZANidine (ZANAFLEX) 4 mg tablet   Yes No   Sig: Take 4 mg by mouth daily at bedtime   ziprasidone (GEODON) 40 mg capsule   No No   Sig: Take 1 capsule (40 mg total) by mouth daily after dinner   zolpidem (AMBIEN) 5 mg tablet   Yes No   Sig: Take 5 mg by mouth daily at bedtime as needed for sleep      Facility-Administered Medications: None       Past Medical History:   Diagnosis Date    Anxiety     Depression     GERD (gastroesophageal reflux disease)     Head injury     Irritable bowel syndrome     Multiple gastric ulcers     Psoriasis        History reviewed  No pertinent surgical history  Family History   Problem Relation Age of Onset    Bipolar disorder Father     Alcohol abuse Father     Bipolar disorder Brother      I have reviewed and agree with the history as documented  Social History     Tobacco Use    Smoking status: Current Every Day Smoker     Packs/day: 0 00     Types: E-Cigarettes    Smokeless tobacco: Never Used    Tobacco comment: vape   Substance Use Topics    Alcohol use: Yes     Alcohol/week: 3 0 standard drinks     Types: 2 Glasses of wine, 1 Shots of liquor per week     Frequency: Monthly or less     Drinks per session: 3 or 4     Comment: "depends on what I a doing"    Drug use: No        Review of Systems   Constitutional: Positive for appetite change  Negative for chills and fever     HENT: Negative for congestion and sore throat  Eyes: Negative for visual disturbance  Respiratory: Negative for cough and shortness of breath  Cardiovascular: Negative for chest pain and palpitations  Gastrointestinal: Negative for abdominal pain, diarrhea, nausea and vomiting  Genitourinary: Negative for difficulty urinating and dysuria  Musculoskeletal: Negative for myalgias  Skin: Negative for rash  Neurological: Positive for light-headedness  Negative for weakness, numbness and headaches  Psychiatric/Behavioral: Positive for dysphoric mood  Negative for self-injury and suicidal ideas  The patient is nervous/anxious  Physical Exam  ED Triage Vitals [09/03/19 0930]   Temp Pulse Respirations Blood Pressure SpO2   -- 64 18 101/56 100 %      Temp src Heart Rate Source Patient Position - Orthostatic VS BP Location FiO2 (%)   -- Monitor Sitting Right arm --      Pain Score       No Pain             Orthostatic Vital Signs  Vitals:    09/03/19 1000 09/03/19 1030 09/03/19 1130 09/03/19 1215   BP: (!) 89/54 98/57 91/50 (!) 89/44   Pulse: 68 62 (!) 54 (!) 54   Patient Position - Orthostatic VS: Sitting Sitting Sitting Sitting       Physical Exam   Constitutional: She is oriented to person, place, and time  She appears well-developed and well-nourished  No distress  HENT:   Head: Normocephalic and atraumatic  Dry mucous membranes  Eyes: Pupils are equal, round, and reactive to light  EOM are normal    Neck: Normal range of motion  Neck supple  Cardiovascular: Normal rate, regular rhythm, normal heart sounds and intact distal pulses  Pulmonary/Chest: Effort normal and breath sounds normal  No respiratory distress  Abdominal: Soft  Bowel sounds are normal  There is no tenderness  Musculoskeletal: Normal range of motion  Neurological: She is alert and oriented to person, place, and time  No cranial nerve deficit or sensory deficit  She exhibits normal muscle tone  Coordination normal    Skin: Skin is warm and dry  Capillary refill takes less than 2 seconds  No pallor  Psychiatric: She has a normal mood and affect  Nursing note and vitals reviewed        ED Medications  Medications   ondansetron (FOR EMS ONLY) (ZOFRAN) 4 mg/2 mL injection 4 mg (0 mg Does not apply Given to EMS 9/3/19 0938)   sodium chloride 0 9 % bolus 1,000 mL (0 mL Intravenous Stopped 9/3/19 1242)       Diagnostic Studies  Results Reviewed     Procedure Component Value Units Date/Time    Urine Microscopic [[id number]]  (Abnormal) Collected:  09/03/19 1051    Lab Status:  Final result Specimen:  Urine, Other Updated:  09/03/19 1123     RBC, UA None Seen /hpf      WBC, UA 4-10 /hpf      Epithelial Cells Occasional /hpf      Bacteria, UA Moderate /hpf      MUCUS THREADS Innumerable    Basic metabolic panel [[id number]]  (Abnormal) Collected:  09/03/19 1037    Lab Status:  Final result Specimen:  Blood from Arm, Left Updated:  09/03/19 1059     Sodium 137 mmol/L      Potassium 3 6 mmol/L      Chloride 110 mmol/L      CO2 23 mmol/L      ANION GAP 4 mmol/L      BUN 7 mg/dL      Creatinine 0 74 mg/dL      Glucose 79 mg/dL      Calcium 7 3 mg/dL      eGFR 115 ml/min/1 73sq m     Narrative:       [place] guidelines for Chronic Kidney Disease (CKD):     Stage 1 with normal or high GFR (GFR > 90 mL/min/1 73 square meters)    Stage 2 Mild CKD (GFR = 60-89 mL/min/1 73 square meters)    Stage 3A Moderate CKD (GFR = 45-59 mL/min/1 73 square meters)    Stage 3B Moderate CKD (GFR = 30-44 mL/min/1 73 square meters)    Stage 4 Severe CKD (GFR = 15-29 mL/min/1 73 square meters)    Stage 5 End Stage CKD (GFR <15 mL/min/1 73 square meters)  Note: GFR calculation is accurate only with a steady state creatinine    POCT urinalysis dipstick [[id number]]  (Normal) Resulted:  09/03/19 1051    Lab Status:  Final result Specimen:  Urine Updated:  09/03/19 1052     Color, UA -    POCT pregnancy, urine [[id number]]  (Normal) Resulted:  09/03/19 1051    Lab Status:  Final result Updated:  09/03/19 1051     EXT PREG TEST UR (Ref: Negative) Negative     Control -    ED Urine Macroscopic [[id number]]  (Abnormal) Collected:  09/03/19 1051    Lab Status:  Final result Specimen:  Urine Updated:  09/03/19 1050     Color, UA Yellow     Clarity, UA Clear     pH, UA 7 0     Leukocytes, UA Negative     Nitrite, UA Negative     Protein, UA 30 (1+) mg/dl      Glucose, UA Negative mg/dl      Ketones, UA Negative mg/dl      Urobilinogen, UA 1 0 E U /dl      Bilirubin, UA Interference- unable to analyze     Blood, UA Negative     Specific Gravity, UA 1 020    Narrative:       CLINITEK RESULT    CBC and differential [[id number]]  (Abnormal) Collected:  09/03/19 1037    Lab Status:  Final result Specimen:  Blood from Arm, Left Updated:  09/03/19 1046     WBC 6 36 Thousand/uL      RBC 3 70 Million/uL      Hemoglobin 11 8 g/dL      Hematocrit 35 6 %      MCV 96 fL      MCH 31 9 pg      MCHC 33 1 g/dL      RDW 12 2 %      MPV 11 6 fL      Platelets 133 Thousands/uL      nRBC 0 /100 WBCs      Neutrophils Relative 44 %      Immat GRANS % 0 %      Lymphocytes Relative 47 %      Monocytes Relative 6 %      Eosinophils Relative 3 %      Basophils Relative 0 %      Neutrophils Absolute 2 81 Thousands/µL      Immature Grans Absolute 0 01 Thousand/uL      Lymphocytes Absolute 2 96 Thousands/µL      Monocytes Absolute 0 37 Thousand/µL      Eosinophils Absolute 0 19 Thousand/µL      Basophils Absolute 0 02 Thousands/µL                  No orders to display         Procedures  Procedures        ED Course  ED Course as of Sep 03 1730   Tue Sep 03, 2019   1139 This EKG was interpreted by me  The EKG demonstrates Normal sinus rhythm, normal intervals and axis, normal QRS, no acute ST changes present  No brugada pattern, delta wave, normal QTC                                      MDM  Number of Diagnoses or Management Options  Diagnosis management comments: 27-year-old female presenting emergency department with hypotension on routine triage for her partial program   Reports lightheadedness since she woke this morning  Appears dehydrated on exam   Has had decreased p o  Intake secondary to her depression  Will do a CBC to evaluate for anemia, BMP to check her electrolytes, EKG to look for arrhythmia, urinalysis to evaluate for pregnancy and ketones  If workup is negative patient's symptoms improved with IV fluids and blood pressure remained stable in the emergency department plan to discharge with PCP follow-up  Disposition  Final diagnoses:   Hypotension due to hypovolemia     Time reflects when diagnosis was documented in both MDM as applicable and the Disposition within this note     Time User Action Codes Description Comment    9/3/2019 11:19 AM Jia Palomino Add [I95 89,  E86 1] Hypotension due to hypovolemia       ED Disposition     ED Disposition Condition Date/Time Comment    Discharge Stable Tue Sep 3, 2019 11:19 AM Ketan Holden discharge to home/self care              Follow-up Information     Follow up With Specialties Details Why Contact Info Additional Information    Sandhya Lujan DO Internal Medicine Schedule an appointment as soon as possible for a visit in 3 days  [place] [address] [address] Emergency Department Emergency Medicine Go to  If symptoms worsen [address] [facility] [address] [facility] ED, [address], [address]          Discharge Medication List as of 9/3/2019 11:31 AM      CONTINUE these medications which have NOT CHANGED    Details   albuterol (PROVENTIL HFA,VENTOLIN HFA) 90 mcg/act inhaler Inhale 1 puff every 4 (four) hours as needed for shortness of breath, Starting Sun 8/18/2019, Historical Med      carisoprodol (SOMA) 350 mg tablet Take 350 mg by mouth 2 (two) times a day, Starting Tue 8/20/2019, Historical Med      Certolizumab Pegol (CIMZIA SC) Inject under the skin 2 syringes every 4 weeks, Historical Med      clonazePAM (KlonoPIN) 0 5 mg tablet Take 0 5 mg by mouth 2 (two) times a day as needed, Historical Med      ergocalciferol (VITAMIN D2) 50,000 units Take 1 capsule by mouth once a week, Starting Mon 7/29/2019, Historical Med      ketorolac (TORADOL) 10 mg tablet Take 10 mg by mouth 3 (three) times a day, Starting Tue 8/20/2019, Historical Med      magnesium oxide (MAG-OX) 400 mg Take 1 tablet (400 mg total) by mouth daily, Starting Mon 7/15/2019, Normal      Norgestrel-Ethinyl Estradiol (CRYSELLE-28 PO) Take 1 tablet by mouth daily  , Until Discontinued, Historical Med      omeprazole (PriLOSEC) 40 MG capsule Take 40 mg by mouth daily, Until Discontinued, Historical Med      oxyCODONE-acetaminophen (PERCOCET) 5-325 mg per tablet Take 1 tablet by mouth every 4 (four) hours as needed for moderate pain, Until Discontinued, Historical Med      prochlorperazine (COMPAZINE) 10 mg tablet Take 1 tablet (10 mg total) by mouth every 6 (six) hours as needed for nausea or vomiting, Starting Mon 7/15/2019, Normal      rizatriptan (MAXALT) 10 MG tablet Take 1 tablet (10 mg total) by mouth once as needed for migraine May repeat in 2 hours if needed  Max 2/24 hours, 9/month , Starting Mon 7/15/2019, Normal      tiZANidine (ZANAFLEX) 4 mg tablet Take 4 mg by mouth daily at bedtime, Starting Mon 6/24/2019, Historical Med      ziprasidone (GEODON) 40 mg capsule Take 1 capsule (40 mg total) by mouth daily after dinner, Starting Mon 8/26/2019, Normal      zolpidem (AMBIEN) 5 mg tablet Take 5 mg by mouth daily at bedtime as needed for sleep, Historical Med           No discharge procedures on file  ED Provider  Attending physically available and evaluated Marlon Felix I managed the patient along with the ED Attending      Electronically Signed by         Peri Skinner MD  09/03/19 7889

## 2019-09-03 NOTE — PROGRESS NOTES
Subjective:     Patient ID: Nisha Lockhart is a 22 y o  female  [facility] Clinical Progress Notes      Specialized Services Documentation  Therapist must complete separate progress note for each specific clinical activity in which the individual participated during the day  Case Management Note    Radu Nelson LPC    Current suicide risk : Unable to assess due to absence  Nisha Lockhart was initially present for program but due to low blood pressure and other physical side effects she was escorted to the emergency room via ambulance  Medications changes/added/denied? No    Treatment session number: N/A    Individual Case Management Visit provided today? No    [facility] follow up physician's orders: None at this time

## 2019-09-04 ENCOUNTER — DOCUMENTATION (OUTPATIENT)
Dept: PSYCHOLOGY | Facility: CLINIC | Age: 24
End: 2019-09-04

## 2019-09-04 ENCOUNTER — APPOINTMENT (OUTPATIENT)
Dept: PSYCHOLOGY | Facility: CLINIC | Age: 24
End: 2019-09-04
Payer: COMMERCIAL

## 2019-09-04 NOTE — PROGRESS NOTES
Subjective:     Patient ID: Nahed Andujar is a 22 y o  female  [facility] Clinical Progress Notes      Specialized Services Documentation  Therapist must complete separate progress note for each specific clinical activity in which the individual participated during the day  Case Management Note    Han Acuña LPC    Current suicide risk : Unable to assess due to absence  Nahed Andujar contacted the program and stated they were not going to be present due to illness and would return tomorrow  Medications changes/added/denied? No    Treatment session number: N/A    Individual Case Management Visit provided today? No    [facility] follow up physician's orders: None at this time

## 2019-09-05 ENCOUNTER — OFFICE VISIT (OUTPATIENT)
Dept: PSYCHOLOGY | Facility: CLINIC | Age: 24
End: 2019-09-05
Payer: COMMERCIAL

## 2019-09-05 VITALS
SYSTOLIC BLOOD PRESSURE: 104 MMHG | TEMPERATURE: 97.8 F | DIASTOLIC BLOOD PRESSURE: 68 MMHG | HEART RATE: 68 BPM | RESPIRATION RATE: 16 BRPM

## 2019-09-05 DIAGNOSIS — F43.10 PTSD (POST-TRAUMATIC STRESS DISORDER): ICD-10-CM

## 2019-09-05 DIAGNOSIS — F41.1 GAD (GENERALIZED ANXIETY DISORDER): ICD-10-CM

## 2019-09-05 DIAGNOSIS — F31.81 BIPOLAR II DISORDER (HCC): Primary | ICD-10-CM

## 2019-09-05 PROCEDURE — H0035 MH PARTIAL HOSP TX UNDER 24H: HCPCS

## 2019-09-05 NOTE — PSYCH
Subjective:     Patient ID: Marilou Sykes is a 22 y o  female  [facility] Clinical Progress Notes      Specialized Services Documentation  Therapist must complete separate progress note for each specific clinical activity in which the individual participated during the day  Group Psychotherapy    ([id number]) Shane Oakley RN     Marilou Sykes attended wellness group today on: Living with Bipolar: Educational Video entitled - Nancy Valera viewed  Discussion  started off with definition of Bipolar Disorder which can be referred  as  manic-depressive disorder    Video viewed  After  the video discussion  began with  : For people living with Bipolar , the view of life can seem dark and ominous or deceptively beautiful  Bipolar illness can twist and distort the mind of physically healthy individuals, to the point that their jobs their families and even lives can be lost   But with education, therapy and medication management, people can regain what they have lost and perhaps live in a world better than they have known  Information provided on all the resources available to treat this disease  Support groups like [facility] ([address] [place] [address]) is a great tool available for people diagnosed with this illness, families and friends  Was able to understand better that with proper diagnosis,  medication and education, this illness is manageable and treatable  [place] will continue to attend wellness groups in order to achieve goals and objectives          Tx Plan Objective: 1 1,1 2,1 3,1 4        Education Therapy   Time:  0618-2671  Previous goal met: Yes   Readiness to Learning: Receptive  Barriers to Learning: None  Learning Assessment  Time: 4234-6761  Education Completed: Wellness Tools  Teaching Method: Verbal  Shared Area of Learning: Yes   Goal Set: " Get my Geodon from the pharmacy and start taking it "  Tx Plan Objective: 1 3   Therapist:  Shane Oakley RN

## 2019-09-05 NOTE — PSYCH
Patient ID: Gisele Sierra is a 22 y o  female    [facility] Clinical Progress Notes      Specialized Services Documentation  Therapist must complete separate progress note for each specific clinical activity in which the individual participated during the day  Allied Therapy group ([phone]) Pt actively participated in Self-esteem development  group  Pt engaged  in group discussion and presented activity  This group focused on the importance of nurturing self-esteem to achieve a more stable state of recovery  Group discussion included the definition of self-esteem, the identification of barriers to good self-esteem, as well as the impact self-esteem has in many areas of life (quality of communication with support system, fulfillment of importance life roles, work performance, motivation to engage in healthy routines, stress/anxiety levels, and participation in meaningful activities) which eventually can cause a relapse  Activity required pt's to identify unhealthy thoughts about self interfering with recovery, then to cover up these thoughts with alternative positive statements  Pt's provided with handout "Calendar to Confidence" to encourage pt's to engage in at least 1 activity each day that makes them feel a sense of increased self-esteem  Pt identified a negative thought she frequently has is fear of failure  Pt provided relevant and positive contributions to group discussion  Affect appeared to be flat throughout duration of group  Pt identified the follow strategies she plans to implement to build her self-esteem: go at your own pace, surround self with positive people, accomplish any goal or small task, complete self-care, and education  Pt appears to be making good progress toward established goals  Continue to engage pt in group allied therapy in order to continue to progress toward long-term goal achievement   Tx Plan Objective: 1 1, 1 2, Therapist: Benny Dias MS, OTR/L

## 2019-09-05 NOTE — PSYCH
Subjective:     Patient ID: Gisele Sierra is a 22 y o  female  [facility] Clinical Progress Notes      Specialized Services Documentation  Therapist must complete separate progress note for each specific clinical activity in which the individual participated during the day  Group Psychotherapy   [id number] Gisele Sierra actively shared in psychotherapy group exploring support  Jamie Khalil identified that day  she was feeling "okay" and "not coping well and wanting to cope better" was driving her to be here  She offered meaningful feedback and was engaged  She shared in mindfulness meditation and was able to speak to the benefit of grounding techniques  Florencia focused on not feeling her supports are helpful - ways to ask for what one needs explored  Some progress voiced toward goal  Continue psychotherapy to explore personal role in wellness and sharing of challenges and successes     Tx Plan Objective: 1 1,1 2, Therapist:  Jes CAMERONBC

## 2019-09-05 NOTE — PSYCH
Subjective:     Patient ID: Martha rCouch is a 22 y o  female  [facility] Clinical Progress Notes      Specialized Services Documentation  Therapist must complete separate progress note for each specific clinical activity in which the individual participated during the day  Case Management Note ([phone]) Bernard Sifuentes RN    Met with Martha Crouch and she denies SI/HI and is not paranoid, psychotic, delusional or hallucinating  Feliberto Tesfaye stated she has not picked up the Geodon that was ordered by Dr Leeanne Aase, MD because she is afraid of weight gain  Education given about Geodon that it is one of the medications that will not produce weight gain if she remains active and she is agreeable to picking it up today and starting it with tonight as prescribed  She understood and accepted education  She will also be getting blood work tomorrow before program as she has fasting blood work  Current suicide risk : Low     Medications changes/added/denied? No    Treatment session number: 5    Individual Case Management Visit provided today?  Yes     [facility] follow up physician's orders: None

## 2019-09-06 ENCOUNTER — DOCUMENTATION (OUTPATIENT)
Dept: PSYCHOLOGY | Facility: CLINIC | Age: 24
End: 2019-09-06

## 2019-09-06 ENCOUNTER — TELEPHONE (OUTPATIENT)
Dept: PSYCHOLOGY | Facility: CLINIC | Age: 24
End: 2019-09-06

## 2019-09-06 ENCOUNTER — APPOINTMENT (OUTPATIENT)
Dept: PSYCHOLOGY | Facility: CLINIC | Age: 24
End: 2019-09-06
Payer: COMMERCIAL

## 2019-09-06 LAB
CHOLEST SERPL-MCNC: 177 MG/DL
CHOLEST/HDLC SERPL: 3.7 (CALC)
HDLC SERPL-MCNC: 48 MG/DL
LDLC SERPL CALC-MCNC: 105 MG/DL (CALC)
NONHDLC SERPL-MCNC: 129 MG/DL (CALC)
TRIGL SERPL-MCNC: 144 MG/DL

## 2019-09-09 ENCOUNTER — TELEPHONE (OUTPATIENT)
Dept: PSYCHOLOGY | Facility: CLINIC | Age: 24
End: 2019-09-09

## 2019-09-09 ENCOUNTER — APPOINTMENT (OUTPATIENT)
Dept: PSYCHOLOGY | Facility: CLINIC | Age: 24
End: 2019-09-09
Payer: COMMERCIAL

## 2019-09-09 ENCOUNTER — DOCUMENTATION (OUTPATIENT)
Dept: PSYCHOLOGY | Facility: CLINIC | Age: 24
End: 2019-09-09

## 2019-09-09 NOTE — PROGRESS NOTES
Subjective:     Patient ID: Jeane Michel is a 22 y o  female  [facility] Clinical Progress Notes      Specialized Services Documentation  Therapist must complete separate progress note for each specific clinical activity in which the individual participated during the day  Case Management Note    Carolee Collazo LPC    Current suicide risk : Unable to assess due to absence  Jeane Michel contacted the program and stated they were not going to be present  She did not provide a reason why  Medications changes/added/denied? No    Treatment session number: N/A    Individual Case Management Visit provided today? No    [facility] follow up physician's orders: None at this time

## 2019-09-09 NOTE — PROGRESS NOTES
Assessment/Plan:       Diagnoses and all orders for this visit:     Bipolar II disorder ([address] )     SHAHRIAR (generalized anxiety disorder)     PTSD (post-traumatic stress disorder)            Subjective:      Patient ID: Melinda Parker is a 22 y o  female      [facility]   AREAS OF NEED: Increased depression and anxiety as evidenced by low motivation, crying spells, racing thoughts, sleep and appetite difficulties, and increased irritability and agitation due to family and occupational stressors     Date Initiated: 08/26/19      Strengths: "caring, smart"             LONG TERM GOAL:   Date Initiated: 8/26/2019  1 0 I will identify three ways in which my overall mood and functioning as stabilized since attending [facility]  Target Date: 09/23/19  Completion Date:      SHORT TERM OBJECTIVES:      Date Initiated: 8/26/2019  1 1 I will learn three new coping skills and implement one daily in order to cope with my anxiety and depressive symptoms  Revision Date: 09/09/19   Target Date: 09/18/19  Completion Date:      Date Initiated: 8/26/2019  1 2 I will learn two emotional expression techniques in order to develop an understanding of my current struggles and begin to cope with my challenges  Revision Date: 09/09/19   Target Date: 09/18/19  Completion Date:     Date Initiated: 8/26/2019  1 3 I will take medications as prescribed and share questions and concerns if arise  Revision Date: 09/09/19   Target Date: 09/18/19  Completion Date:      Date Initiated: 8/26/2019  1 4 I will identify 3 ways my supports can assist in my recovery and agree to staff/support contact as indicated      Revision Date: 09/09/19  Target Date: 09/18/19  Completion Date:             7 DAY REVISION:     Date Initiated: 09/09/19   1 5 I will identify one skill I am utilizing outside of program to improve my moods and share with my treatment team    Revision Date:   Target Date: 09/18/19  Completion Date:          PSYCHIATRY:  Date Initiated: 08/26/19  Medication Management and Education       Revision Date: 9/9/2019       Continue medication management  The person(s) responsible for carrying out the plan is Shaylee Becerril MD    NURSING:   Date Initiated: 08/26/19  1 1,1 2,1 3,1 4 This RN will provide daily wellness group five days weekly to educate Quan Villegas on S/S of her diagnoses and medications used in treatment  Revision Date: 9/9/2019  1 1,1 2,1 3,1 4,1 5 Continue to encourage Quan Villegas to participate in wellness groups daily to learn about symptoms, coping strategies and warning signs to promote relapse prevention  The person(s) responsible for carrying out the plan is Robi Moran RN    PSYCHOLOGY:   Date Initiated: 08/26/19       1 1, 1 2, 1 4 Provide psychotherapy group 5 times per week to allow opportunity for Quan Villegas to explore stressors and ways of coping  Revision Date: 9/9/2019  1 1,1 2,1 4,1 5 Continue to provide psychotherapy group daily to Quan Villegas and encourage sharing of stressors, skills and positive change  The person(s) responsible for carrying out the plan is Ct Quigley MA, LPC    ALLIED THERAPY:   Date Initiated: 08/26/19  1 1,1 2 Engage Quan Villegas in AT group 5 times daily to encourage development and use of wellness tools to decrease symptoms and promote recovery through meaningful activity  Revision Date: 9/9/2019  1 1,1 2,1 5 Continue to engage Quan Villegas to participate in AT group to practice wellness tools within program and transfer to home sharing successes and barriers through healthy task involvement     The person(s) responsible for carrying out the plan is HUEY Duncan       CASE MANAGEMENT:   Date Initiated: 08/26/19      1 0 This  will meet with Quan Villegas 3-4 times weekly to assess treatment progress, discharge planning, connection to community supports and UR as indicated  Revision Date: 9/9/2019  1 0 Continue to meet with Lorenza Teresa 3-4 times weekly to assess growth, work toward goals, continued treatment needs, dc planning and use of supports  The person(s) responsible for carrying out the plan is Nikolas Thomason MA, LPC    TREATMENT REVIEW/COMMENTS:      DISCHARGE CRITERIA: Identify 3 signs of progress and complete relapse prevention plan      DISCHARGE PLAN: Medication management and outpatient therapy  Estimated Length of Stay: 10 treatment days               Diagnosis and Treatment Plan explained to Arun Melton relates understanding diagnosis and is agreeable to Treatment Plan             CLIENT COMMENTS / Please share your thoughts, feelings, need and/or experiences regarding your treatment plan: _____________________________________________________________________________________________________________________________________________________________________________________________________________________________________________________________________________________________________________________ Date/Time: ______________      Patient Signature: _________________________________      Date/Time: ______________       Signature: _________________________________     Date/Time: ______________

## 2019-09-10 ENCOUNTER — APPOINTMENT (OUTPATIENT)
Dept: PSYCHOLOGY | Facility: CLINIC | Age: 24
End: 2019-09-10
Payer: COMMERCIAL

## 2019-09-10 ENCOUNTER — DOCUMENTATION (OUTPATIENT)
Dept: PSYCHOLOGY | Facility: CLINIC | Age: 24
End: 2019-09-10

## 2019-09-10 NOTE — PROGRESS NOTES
Subjective:     Patient ID: Nisha Lockhart is a 22 y o  female  [facility] Clinical Progress Notes      Specialized Services Documentation  Therapist must complete separate progress note for each specific clinical activity in which the individual participated during the day  Case Management Note    Radu Nelson LPC    Current suicide risk : Unable to assess due to absence  Nisha Lockhart was not in attendance at program today  She cancelled with the  and provided no reason for cancellation  This writer attempted to call her at 1300 and the call went straight to voicemail  This is Florencia's third consecutive absence without speaking with this writer  She was notified if she misses tomorrow, she will be discharged  Medications changes/added/denied? No    Treatment session number: N/A    Individual Case Management Visit provided today? No    [facility] follow up physician's orders: None at this time

## 2019-09-11 ENCOUNTER — OFFICE VISIT (OUTPATIENT)
Dept: PSYCHOLOGY | Facility: CLINIC | Age: 24
End: 2019-09-11
Payer: COMMERCIAL

## 2019-09-11 ENCOUNTER — OFFICE VISIT (OUTPATIENT)
Dept: PSYCHIATRY | Facility: CLINIC | Age: 24
End: 2019-09-11
Payer: COMMERCIAL

## 2019-09-11 VITALS
DIASTOLIC BLOOD PRESSURE: 63 MMHG | HEIGHT: 66 IN | SYSTOLIC BLOOD PRESSURE: 96 MMHG | WEIGHT: 152.7 LBS | BODY MASS INDEX: 24.54 KG/M2 | HEART RATE: 79 BPM

## 2019-09-11 DIAGNOSIS — F31.81 BIPOLAR II DISORDER (HCC): Primary | ICD-10-CM

## 2019-09-11 DIAGNOSIS — T50.905A EXTRAPYRAMIDAL MOVEMENT DISORDER, DRUG-INDUCED: Primary | ICD-10-CM

## 2019-09-11 DIAGNOSIS — F31.81 BIPOLAR II DISORDER (HCC): ICD-10-CM

## 2019-09-11 DIAGNOSIS — G25.89 EXTRAPYRAMIDAL MOVEMENT DISORDER, DRUG-INDUCED: Primary | ICD-10-CM

## 2019-09-11 DIAGNOSIS — F41.1 GAD (GENERALIZED ANXIETY DISORDER): ICD-10-CM

## 2019-09-11 DIAGNOSIS — F43.10 PTSD (POST-TRAUMATIC STRESS DISORDER): ICD-10-CM

## 2019-09-11 PROCEDURE — H0035 MH PARTIAL HOSP TX UNDER 24H: HCPCS

## 2019-09-11 PROCEDURE — 99213 OFFICE O/P EST LOW 20 MIN: CPT | Performed by: NURSE PRACTITIONER

## 2019-09-11 RX ORDER — BENZTROPINE MESYLATE 0.5 MG/1
0.5 TABLET ORAL
Qty: 60 TABLET | Refills: 0 | Status: SHIPPED | OUTPATIENT
Start: 2019-09-11 | End: 2020-01-07 | Stop reason: ALTCHOICE

## 2019-09-11 NOTE — PSYCH
Subjective:     Patient ID: Paige Childers is a 22 y o  female  [facility] Clinical Progress Notes      Specialized Services Documentation  Therapist must complete separate progress note for each specific clinical activity in which the individual participated during the day  Group Psychotherapy   ([id number]) Paige Childers participated in group psychotherapy process today observed by music therapist   Group began with this writer facilitating a grounding exercise  Clients checked in, shared how they were feeling and shared an experience they had with loss and how they coped  Clients shared in open discussion on grief and loss and provided an understanding that there is no right or wrong way to experience grief  This writer provided support on acceptance of emotions regarding grief and loss and other coping mechanisms  Bobbi Coulter was engaged in group discussion but at times was outwardly looking for a reaction from the group  She provided examples with how she has become numb to grief  Bobbi Coulter continues to make progress towards goals through participation in groups and is encouraged to continue in order to progress towards long term goals  TX Plan Objective: 1 1, 1 2 Therapist:  Danae Martinez MA, LPC    Education Therapy   Time:  5567-0212  Previous goal met: Yes   Readiness to Learning: Receptive  Barriers to Learning: None  Learning Assessment  Time: 3946-8858  Education Completed: Medication, Aftercare and Illness/Wellness Tools  Teaching Method: Verbal, Written and Demonstration  Shared Area of Learning: Yes   Goal Set: "Fill out papers for learners permit"  Tx Plan Objective: 1 4 Therapist:  Danae Martinez [facility]      Other     Case Management Note    Danae Martinez LPC    Current suicide risk : Low    ([id number]) Met with Florencia for a case management session and discussed her absences and improvements    Wanted to schedule a family meeting with her dad as she believes that he is in a place to be more accepting of where she is at in her life and can be more supportive  Offered different times for dad to consider  Medications changes/added/denied? Yes     Treatment session number: 6    Individual Case Management Visit provided today?  Yes     [facility] follow up physician's orders: None at this time

## 2019-09-11 NOTE — PSYCH
Subjective:     Patient ID: Allegra Colon is a 22 y o  female  [facility] Clinical Progress Notes      Specialized Services Documentation  Therapist must complete separate progress note for each specific clinical activity in which the individual participated during the day  Group Psychotherapy       ([id number]) Corinne Nest and Student Nurse  Allegra Colon attended wellness group today with focus on developing own personal Safety Plan in times of Relapse   Group discussion began with:  Safety Plan If/When Relapse in Mental Illness or Chemical Addiction Occur is a necessary tool     Your Safety plan should include signs and symptoms you should be familiar with ( AKA Your Red Flags)   (1) When I am in relapse I become   ( what symptoms you experiencing - Different for everyone  (2) My Safety word is   And (3) I will share this plan with   Rosella Goldmann Keep this plan readily available in case you are experiencing a crisis and/or a relapse and share it with anyone who can help you out if this is happening  Discussion on sharing your safety word when you feel your are headed for a crisis  By letting them know your crisis word they can get you the appropriate help needed   Florencia as able to develop this safety plan with all the parts in place and plans to share this plan with her father  Omer Ahn  will continue to attend wellness groups to achieve goals and objectives towards mental wellness        Tx Plan Objective:  1 1,1 2, 1 4

## 2019-09-11 NOTE — PSYCH
Treatment Recommendations- Risks Benefits     Immediate Medical/Psychiatric/Psychotherapy Treatments and Any Precautions: Continue with:    Partial Program    1  On Percocet 0 25 mg "for emergencies only"  2  On Soma 350 mg BID daily   3  Toradol 10 mg TID for pain  4  Tinazidine 4 mg daily at HS   5  CBD oil "roll on" applied on neck, back uses it everyday  6  Klonopin 0 5 mg BID prn prescribed by Dr Clifford Gastelum  7  Start Cogentin 0 5 mg daily at HS for EPS sxs's  8  Continue with Geodon 40 mg daily-Take with lunch instead that with dinner  Risks, Benefits And Possible Side Effects Of Medications:  Risks, benefits, and possible side effects of medications explained to patient and patient verbalizes understanding and Risks of medications explained if female patient  Patient verbalizes understanding and agrees to notify her doctor if she becomes pregnant    Controlled Medication Discussion: The patient has been filling controlled prescriptions on time as prescribed to [facility] program    PCP prescribes benzos  Assessment/Plan:       Subjective:     Patient ID: Ellen Asher is a 22 y o  that has been partially participating in the Partial Program since 8/29  She has been absent for 3 days as she said she had blood work on Friday, on Monday she had a medical appt, and yesterday she was unable to wake up due to the medication  She stated "I can't function, still can't"  Shared how she had a "break down from Seroquel" "I was shaking, rocking, suicidal"  She continues to read side effects of medications and now is worried about weight gain with Geodon, and side effects of other medications mentioned/tried  Shared how she gained "from 115-155" with Cymbalta  Continues to experience body pain, muscle jerks  She believes she may have MS as her father  Has been diagnosed with TMJ and reported she had jaw stiffness every night since started Geodon   She seemed pessimist about achieving mood improvement at the beginning of assessment but improved towards the end  Will add Cogentin 0 5 mg daily at [facility] to alleviate stiffness and prevent EPS  HPI ROS Appetite Changes and Sleep: decreased appetite, decreased energy and decrease in number of sleep hours didn't go to bed until 4:45 am and didn't take medication Geodon until 7 pm  She reports the first night she fell asleep but then after it it is keeping her awake until 4 am      Review Of Systems:    Mood anxiety and depression   Behavior cooperative   Thought Content disturbing thoughts, feelings and negative thoughts   General emotional problems, sleep disturbances and decreased functioning   Personality no change in personality   Other Psych Symptoms normal   Constitutional negative   ENT negative   Cardiovascular negative   Respiratory negative   Gastrointestinal negative   Genitourinary negative   Musculoskeletal negative   Integumentary negative   Neurological negative   Endocrine negative   Other Symptoms none          Laboratory Results:     Lab Results:   No visits with results within 1 Day(s) from this visit     Latest known visit with results is:   Orders Only on 09/06/2019   Component Date Value    Total Cholesterol 09/06/2019 177     HDL 09/06/2019 48*    Triglycerides 09/06/2019 144     LDL Direct 09/06/2019 105*    Chol HDLC Ratio 09/06/2019 3 7     Non-HDL Cholesterol 09/06/2019 129        Substance Abuse History:  Social History     Substance and Sexual Activity   Drug Use No       Family Psychiatric History:   Family History   Problem Relation Age of Onset    Diabetes Mother         Type 2    Allergic rhinitis Mother     Hyperlipidemia Mother     Allergic rhinitis Father     Asthma Father     Hyperlipidemia Father     Migraines Father     Rashes / Skin problems Father     Bipolar disorder Brother     Depression Brother     Suicide Attempts Brother     Cancer Maternal Grandmother         Breast cancer and breast cancer in the hip    Cancer Paternal Grandmother         Breast, lung, ovarian    Migraines Paternal Grandmother     Depression Maternal Aunt     Bipolar disorder Maternal Aunt     Bipolar disorder Paternal Aunt     Depression Paternal Aunt     Bipolar disorder Cousin    Princess Anguiano ADD / ADHD Cousin     Suicide Attempts Cousin        The following portions of the patient's history were reviewed and updated as appropriate: allergies, current medications, past family history, past medical history, past social history, past surgical history and problem list       Objective:     Physical Exam      Objective   Vital signs in last 24 hours:    96/63 HR 79  152 7 lb    Mental status:  Appearance calm and cooperative , adequate hygiene and grooming, demonstrated behavior psychomotor retardation and good eye contact    Mood depressed, apathetic and uncertain   Affect affect was blunted   Speech slowed, speech soft and sparse   Thought Processes coherent/organized   Hallucinations no hallucinations present    Thought Content no delusions   Abnormal Thoughts obsessive thought content, no suicidal thoughts  and no homicidal thoughts    Orientation  oriented to place and oriented to time   Recent & Remote Memory short term memory intact and long term memory intact   Attention & Concentration Span concentration intact   Intellect Appears to be of Average Intelligence   Fund of Knowledge displays adequate knowledge of current events   Insight Limited insight   Judgement judgment was limited   Muscle Strength Normal gait    Language no difficulty naming common objects and no difficulty repeating a phrase    Fund of Knowledge displays adequate knowledge of current events   Pain moderate to severe   Pain Scale 3

## 2019-09-11 NOTE — PSYCH
Subjective:     Patient ID: Gisele Sierra is a 22 y o  female  [facility] Clinical Progress Notes      Specialized Services Documentation  Therapist must complete separate progress note for each specific clinical activity in which the individual participated during the day  Allied Therapy   [id number] Gisele Sierra actively shared in [facility] group focused on managing anger and emotional regulation skills  Florencia engaged in task exploring effective versus ineffective ways in addition to skills to refocus in the moment  Florencia shared at times yet superficially  Group explored the benefits of positive choices with intense emotion and factors that increase emotional vulnerability  Some work toward goal noted   Continue AT to explore wellness tool awareness and practice     Tx Plan Objective: 1 1, Therapist:  Jes Costa MT-BC

## 2019-09-12 ENCOUNTER — OFFICE VISIT (OUTPATIENT)
Dept: PSYCHOLOGY | Facility: CLINIC | Age: 24
End: 2019-09-12
Payer: COMMERCIAL

## 2019-09-12 DIAGNOSIS — F41.1 GAD (GENERALIZED ANXIETY DISORDER): Primary | ICD-10-CM

## 2019-09-12 DIAGNOSIS — F31.81 BIPOLAR II DISORDER (HCC): ICD-10-CM

## 2019-09-12 DIAGNOSIS — F43.10 PTSD (POST-TRAUMATIC STRESS DISORDER): ICD-10-CM

## 2019-09-12 PROCEDURE — H0035 MH PARTIAL HOSP TX UNDER 24H: HCPCS

## 2019-09-12 NOTE — PSYCH
Subjective:     Patient ID: Eloisa Luque is a 22 y o  female  [facility] Clinical Progress Notes      Specialized Services Documentation  Therapist must complete separate progress note for each specific clinical activity in which the individual participated during the day  Group Psychotherapy     ([id number]) Eloisa Luque actively participated in group psychotherapy process observed by nursing students  Group began with this writer facilitating a mindfulness exercise on self-compassion  Clients checked in, shared how they were feeling and identified something that is important to them  Clients reviewed values and the values they wish to live by in the different domains of their life, and ways they can live in accordance to their values  Clients engaged in an open discussion on living in accordance with their values  Florencia was quiet during group  She made minimal progress towards goals through group participation and is encouraged to continue working towards long term goals through program compliance  TX Plan Objective: 1 1, 1 2  Therapist:  Andrea Branham [facility]    Other     Case Management Note    Waleska Ramírez LPC    Current suicide risk : Low     ([id number]) Met with Florencia and discussed planned family session  She stated she attempted to ask her father about it and he denied availability and she refused to continue to talk about it  Medications changes/added/denied? No    Treatment session number: 7    Individual Case Management Visit provided today?  Yes     [facility] follow up physician's orders: None at this time

## 2019-09-12 NOTE — PSYCH
Subjective:     Patient ID: Naldo Roman is a 22 y o  female  [facility] Clinical Progress Notes      Specialized Services Documentation  Therapist must complete separate progress note for each specific clinical activity in which the individual participated during the day  Group Psychotherapy       ([id number]) Dimas Do RN   Naldo Roman  actively shared in psychotherapy  group focused on decreasing self- stigma and supports  Florencia   attentively listened to Certified Peer Specialist Edson Natarajan share his life story  Group encouraged power of learning about self, accepting illness and personal responsibility in recovery  Community resources reviewed in addition to personal resources like the [employer]  Florencia  progress toward goal noted  Continue psychotherapy to encourage self -awareness and healthy engagement of supports            Tx Plan Objective: 1 3,1 4

## 2019-09-12 NOTE — PSYCH
Subjective:     Patient ID: Kori Laguna is a 22 y o  female  [facility] Clinical Progress Notes      Specialized Services Documentation  Therapist must complete separate progress note for each specific clinical activity in which the individual participated during the day  Allied Therapy   [id number] Kori Laguna actively shared in [facility] group focused on interpersonal effectiveness with focus on validation  Florencia engaged in tasks exploring what validation is and why it is important  Skills [facility] GIVE FAST introduced  She was able to identify value in the skills yet did not share specifics  Some progress toward goal noted  Continue AT to encourage sharing, personal advocacy and practice of wellness tools         Tx Plan Objective: 1 2, Therapist:  Joaquina ARZATE    Education Therapy   Time:  7358-5981  Previous goal met: Yes   Readiness to Learning: Receptive  Barriers to Learning: None  Learning Assessment  Time: 6671-2491  Education Completed: Illness and Wellness Tools  Teaching Method: Verbal and Demonstration  Shared Area of Learning: Yes   Goal Set: get a haircut  Tx Plan Objective: 1 1, Therapist:  Joaquina ARZATE

## 2019-09-13 ENCOUNTER — OFFICE VISIT (OUTPATIENT)
Dept: PSYCHOLOGY | Facility: CLINIC | Age: 24
End: 2019-09-13
Payer: COMMERCIAL

## 2019-09-13 DIAGNOSIS — F43.10 PTSD (POST-TRAUMATIC STRESS DISORDER): ICD-10-CM

## 2019-09-13 DIAGNOSIS — F31.81 BIPOLAR II DISORDER (HCC): ICD-10-CM

## 2019-09-13 DIAGNOSIS — F41.1 GAD (GENERALIZED ANXIETY DISORDER): Primary | ICD-10-CM

## 2019-09-13 PROCEDURE — H0035 MH PARTIAL HOSP TX UNDER 24H: HCPCS

## 2019-09-13 NOTE — PSYCH
Subjective:     Patient ID: Paige Childers is a 22 y o  female  [facility] Clinical Progress Notes      Specialized Services Documentation  Therapist must complete separate progress note for each specific clinical activity in which the individual participated during the day  Group Psychotherapy [id number]  Paige Childers  attended Wellness Group Personal Recovery Inventory  This is a group activity to increase understanding about the nature of the healing process and to develop self awareness  There was a definition offered describing  recovery as a process  with many steps,including ups and downs  The group discussed specific tasks of recovery that include identifying, experiencing, and managing new ways, and adopting new strategies and routines which will be used often for many years  Paige Childers participated in and contributed to the group discussion talking about her recovery related to PTSD and anxiety/panic attacks and initially was asking how mindfulness and breathing could help in the midst of a panic episode  There was discussion involving several group members about the benefits and the need for practice of mindfulness and breathing skills  She shared with the group on other areas of her recovery both historically and presently  She received support, encouragement and suggestions from peers  Paige Childers is making progress in wellness group and will continue to attend to support her wellness goals and objectives    Tx Plan Objective: 1 1, Sheri Colin RN and co-facilitator Sharyn Quigley RN

## 2019-09-13 NOTE — PSYCH
Subjective:     Patient ID: Ken Ríos is a 22 y o  female  [facility] Clinical Progress Notes      Specialized Services Documentation  Therapist must complete separate progress note for each specific clinical activity in which the individual participated during the day  Group Psychotherapy       ([id number])  Anne Limon RN ;  Adrienne Painting MA, [facility]     Ken Ríos actively  attended- : weekly  wellness assessment group today  Reviewing weekend supports and plan as we approach the weekend  Medications reviewed with  no concerns or questions at this time  Compliance of medications reviewed and understood  The six (6) dimension of wellness discussed (Physical, Emotional, Cognitive, Vocational, Social and Spiritual)  The format for assessing wellness and measuring progress   reviewed   Questions asked :  (1) I intend to improve in this area by    (2) My first step will be   (3) I will share my plans with her father and ask for his support by saying " these are my action plans for my mental health and wellness  "  Cuco Ibeth making  progress towards goals and objectives and will continue to attend wellness group      Tx Plan Objective: 1 1,1 2,1 3,1 5,1 5

## 2019-09-13 NOTE — PSYCH
Subjective:     Patient ID: Carole Perez is a 22 y o  female  [facility] Clinical Progress Notes      Specialized Services Documentation  Therapist must complete separate progress note for each specific clinical activity in which the individual participated during the day  Group Psychotherapy     ([id number]) Carole Perez participated in psychotherapy process group today cofacilitated with psychiatric RN  This writer facilitated a mindfulness exercise on self-compassion  Clients checked in, shared how they were feeling and a pleasant or productive activity they have planned for the weekend  Clients engaged in a discussion on different applications that have been helpful to them on their journey to wellness  Clients discussed different types of coping skills they engage in and openly discussed unproductive coping skills  David De La Paz was actively engaged in group discussion  She was open with peers and offered support  David De La Paz continues to make progress towards goals through participating in group psychotherapy and is encouraged to continue to progress towards long term goals  Tx Plan Objective: 1 1, 1 2 Therapist:  Chin Goldsmith LPC and Levar Rowe RN    Education Therapy   Time:  4291-0108  Previous goal met: Yes   Readiness to Learning: Receptive  Barriers to Learning: None  Learning Assessment  Time: 5126-7489  Education Completed: Illness and Wellness Tools  Teaching Method: Verbal, Written and Demonstration  Shared Area of Learning: Yes   Goal Set: "Get a tattoo and be productive this weekend"  Tx Plan Objective: 1 4 Therapist:  [place] G[place] [facility]        Other     Case Management Note    Chin Goldsmith LPC    Current suicide risk : Low     ([id number]) Met with Florencia and reviewed stressors regarding work  Discussed wellness and ability to communicate more  She identified improved moods and motivation  Medications changes/added/denied?  No    Treatment session number: 8    Individual Case Management Visit provided today?  Yes     [facility] follow up physician's orders: None at this time

## 2019-09-16 ENCOUNTER — DOCUMENTATION (OUTPATIENT)
Dept: PSYCHOLOGY | Facility: CLINIC | Age: 24
End: 2019-09-16

## 2019-09-16 NOTE — PROGRESS NOTES
Subjective:     Patient ID: Chris Felix is a 22 y o  female  [facility] Clinical Progress Notes      Specialized Services Documentation  Therapist must complete separate progress note for each specific clinical activity in which the individual participated during the day  Case Management Note    Irene Conde LPC    Current suicide risk : Unable to assess due to absence  Chris Felix was excused from program today 09/16/19 in order to attend a physical health appointment  Medications changes/added/denied? No    Treatment session number: N/A    Individual Case Management Visit provided today? No    [facility] follow up physician's orders: None at this time

## 2019-09-17 ENCOUNTER — DOCUMENTATION (OUTPATIENT)
Dept: PSYCHOLOGY | Facility: CLINIC | Age: 24
End: 2019-09-17

## 2019-09-18 ENCOUNTER — DOCUMENTATION (OUTPATIENT)
Dept: PSYCHOLOGY | Facility: CLINIC | Age: 24
End: 2019-09-18

## 2019-09-18 DIAGNOSIS — F43.10 PTSD (POST-TRAUMATIC STRESS DISORDER): ICD-10-CM

## 2019-09-18 DIAGNOSIS — F31.81 BIPOLAR II DISORDER (HCC): ICD-10-CM

## 2019-09-18 DIAGNOSIS — F41.1 GAD (GENERALIZED ANXIETY DISORDER): Primary | ICD-10-CM

## 2019-09-18 NOTE — PROGRESS NOTES
Subjective:     Patient ID: Constantino Dick is a 22 y o  female  [facility] Clinical Progress Notes      Specialized Services Documentation  Therapist must complete separate progress note for each specific clinical activity in which the individual participated during the day  Case Management Note    Lennie Mccloud LPC    Current suicide risk : Unable to assess due to absence  Constantino Dick is being discharged from [facility] due to an inability to meet consistent attendance requirements  Medications changes/added/denied? No    Treatment session number: N/A    Individual Case Management Visit provided today?  No    [facility] follow up physician's orders:     DISCHARGE TODAY  DATE 09/18/19  TIME [id number]  Dr Geremias Walters MD

## 2019-09-18 NOTE — PROGRESS NOTES
Assessment/Plan:       Diagnoses and all orders for this visit:     Bipolar II disorder ([facility] [address] )     SHAHRIAR (generalized anxiety disorder)     PTSD (post-traumatic stress disorder)           Subjective:      Patient ID: Florencia Schilling is a 24 y o  female      [facility] Treatment Plan   AREAS OF NEED: Increased depression and anxiety as evidenced by low motivation, crying spells, racing thoughts, sleep and appetite difficulties, and increased irritability and agitation due to family and occupational stressors     Date Initiated: 08/26/19      Strengths: "caring, smart"             LONG TERM GOAL:   Date Initiated: 8/26/2019  1 0 I will identify three ways in which my overall mood and functioning as stabilized since attending [facility]  Target Date: 09/23/19  Completion Date: 09/18/19      SHORT TERM OBJECTIVES:      Date Initiated: 8/26/2019  1 1 I will learn three new coping skills and implement one daily in order to cope with my anxiety and depressive symptoms  Revision Date: 09/09/19   Target Date: 09/18/19  Completion Date: 09/18/19      Date Initiated: 8/26/2019  1 2 I will learn two emotional expression techniques in order to develop an understanding of my current struggles and begin to cope with my challenges     Revision Date: 09/09/19   Target Date: 09/18/19  Completion Date: 09/18/19      Date Initiated: 8/26/2019  1 3 I will take medications as prescribed and share questions and concerns if arise     Revision Date: 09/09/19   Target Date: 09/18/19  Completion Date:  09/18/19      Date Initiated: 8/26/2019  1 4 I will identify 3 ways my supports can assist in my recovery and agree to staff/support contact as indicated     Revision Date: 09/09/19  Target Date: 09/18/19  Completion Date: 09/18/19              7 DAY REVISION:     Date Initiated: 09/09/19   1 5 I will identify one skill I am utilizing outside of program to improve my moods and share with my treatment team    Revision Date:   Target Date: 09/18/19  Completion Date: 09/18/19            PSYCHIATRY:  Date Initiated: 08/26/19  Medication Management and Education       Revision Date: 9/9/2019       Continue medication management  The person(s) responsible for carrying out the plan is Roland Burger MD     NURSING:   Date Initiated: 08/26/19  1 1,1 2,1 3,1 4 This RN will provide daily wellness group five days weekly to educate Naldo Roman on S/S of her diagnoses and medications used in treatment  Revision Date: 9/9/2019  1 1,1 2,1 3,1 4,1 5 Continue to encourage Naldo Roman to participate in wellness groups daily to learn about symptoms, coping strategies and warning signs to promote relapse prevention  The person(s) responsible for carrying out the plan is Dimas Do RN     PSYCHOLOGY:   Date Initiated: 08/26/19       1 1, 1 2, 1 4 Provide psychotherapy group 5 times per week to allow opportunity for Naldo Roman to explore stressors and ways of coping  Revision Date: 9/9/2019  1 1,1 2,1 4,1 5 Continue to provide psychotherapy group daily to Naldo Roman and encourage sharing of stressors, skills and positive change  The person(s) responsible for carrying out the plan is Trent Sevilla MA, LPC     ALLIED THERAPY:   Date Initiated: 08/26/19  1 1,1 2 Engage Naldo Roman in AT group 5 times daily to encourage development and use of wellness tools to decrease symptoms and promote recovery through meaningful activity  Revision Date: 9/9/2019  1 1,1 2,1 5 Continue to engage Naldo Roman to participate in AT group to practice wellness tools within program and transfer to home sharing successes and barriers through healthy task involvement     The person(s) responsible for carrying out the plan is HUEY Murillo         CASE MANAGEMENT:   Date Initiated: 08/26/19      1 0 This  will meet with Naldo Roman 3-4 times weekly to assess treatment progress, discharge planning, connection to community supports and UR as indicated  Revision Date: 9/9/2019  1 0 Continue to meet with Neerajmaandrea Michel 3-4 times weekly to assess growth, work toward goals, continued treatment needs, dc planning and use of supports    The person(s) responsible for carrying out the plan is Carolee Collazo MA, LPC     TREATMENT REVIEW/COMMENTS:      DISCHARGE CRITERIA: Identify 3 signs of progress and complete relapse prevention plan     DISCHARGE PLAN: Medication management and outpatient therapy  Estimated Length of Stay: 10 treatment days               Diagnosis and Treatment Plan explained to Florencia Don relates understanding diagnosis and is agreeable to Treatment Plan             CLIENT COMMENTS / Please share your thoughts, feelings, need and/or experiences regarding your treatment plan: _____________________________________________________________________________________________________________________________________________________________________________________________________________________________________________________________________________________________________________________ Date/Time: ______________      Patient Signature: _________________________________      Date/Time: ______________       Signature: _________________________________     Date/Time: ______________
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
## 2019-09-30 DIAGNOSIS — F31.81 BIPOLAR II DISORDER (HCC): ICD-10-CM

## 2019-09-30 RX ORDER — ZIPRASIDONE HYDROCHLORIDE 40 MG/1
40 CAPSULE ORAL
Qty: 30 CAPSULE | Refills: 0 | Status: SHIPPED | OUTPATIENT
Start: 2019-09-30 | End: 2020-01-07 | Stop reason: ALTCHOICE

## 2019-10-01 RX ORDER — ZIPRASIDONE HYDROCHLORIDE 40 MG/1
40 CAPSULE ORAL
Qty: 30 CAPSULE | Refills: 0 | OUTPATIENT
Start: 2019-10-01

## 2019-10-03 ENCOUNTER — SOCIAL WORK (OUTPATIENT)
Dept: BEHAVIORAL/MENTAL HEALTH CLINIC | Facility: CLINIC | Age: 24
End: 2019-10-03
Payer: COMMERCIAL

## 2019-10-03 DIAGNOSIS — F31.81 BIPOLAR II DISORDER (HCC): ICD-10-CM

## 2019-10-03 DIAGNOSIS — F41.1 GAD (GENERALIZED ANXIETY DISORDER): Primary | ICD-10-CM

## 2019-10-03 DIAGNOSIS — F43.10 PTSD (POST-TRAUMATIC STRESS DISORDER): ICD-10-CM

## 2019-10-03 PROCEDURE — 90834 PSYTX W PT 45 MINUTES: CPT | Performed by: SOCIAL WORKER
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
## 2019-10-18 ENCOUNTER — DOCUMENTATION (OUTPATIENT)
Dept: PSYCHOLOGY | Facility: CLINIC | Age: 24
End: 2019-10-18

## 2019-10-18 NOTE — PROGRESS NOTES
Loraine Caputo called asking advice from psychiatrist about wanting to stop her Geodon because she stated its making her " too anxious"   I called Dr Eder Jesus MD and relayed this message  Florencia was instructed per Dr Yoel Selby to take her Geodon 40 mg tonight then do not take it tomorrow and Sunday take the Geodon then do not take it Monday for 1 week   Florencia repeated the instructions and understood instructions

## 2020-01-07 ENCOUNTER — OFFICE VISIT (OUTPATIENT)
Dept: NEUROLOGY | Facility: CLINIC | Age: 25
End: 2020-01-07
Payer: COMMERCIAL

## 2020-01-07 VITALS
HEIGHT: 67 IN | WEIGHT: 157.9 LBS | BODY MASS INDEX: 24.78 KG/M2 | SYSTOLIC BLOOD PRESSURE: 131 MMHG | HEART RATE: 75 BPM | DIASTOLIC BLOOD PRESSURE: 85 MMHG

## 2020-01-07 DIAGNOSIS — M54.2 CERVICALGIA: ICD-10-CM

## 2020-01-07 DIAGNOSIS — F51.01 PRIMARY INSOMNIA: ICD-10-CM

## 2020-01-07 DIAGNOSIS — F41.1 GAD (GENERALIZED ANXIETY DISORDER): Primary | ICD-10-CM

## 2020-01-07 DIAGNOSIS — R42 LIGHTHEADED: ICD-10-CM

## 2020-01-07 DIAGNOSIS — F43.10 PTSD (POST-TRAUMATIC STRESS DISORDER): ICD-10-CM

## 2020-01-07 DIAGNOSIS — E55.9 VITAMIN D DEFICIENCY: ICD-10-CM

## 2020-01-07 DIAGNOSIS — G43.711 INTRACTABLE CHRONIC MIGRAINE WITHOUT AURA AND WITH STATUS MIGRAINOSUS: ICD-10-CM

## 2020-01-07 DIAGNOSIS — G43.109 MIGRAINE WITH AURA AND WITHOUT STATUS MIGRAINOSUS, NOT INTRACTABLE: ICD-10-CM

## 2020-01-07 PROBLEM — G44.309 POST-TRAUMATIC HEADACHE: Status: RESOLVED | Noted: 2017-06-14 | Resolved: 2020-01-07

## 2020-01-07 PROBLEM — G47.00 INSOMNIA: Status: ACTIVE | Noted: 2020-01-07

## 2020-01-07 PROBLEM — R11.0 NAUSEA WITHOUT VOMITING: Status: RESOLVED | Noted: 2017-06-14 | Resolved: 2020-01-07

## 2020-01-07 PROBLEM — H53.149 VISUAL DISCOMFORT: Status: RESOLVED | Noted: 2017-06-14 | Resolved: 2020-01-07

## 2020-01-07 PROCEDURE — 99215 OFFICE O/P EST HI 40 MIN: CPT | Performed by: PSYCHIATRY & NEUROLOGY
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
## 2020-01-07 NOTE — PROGRESS NOTES
Review of Systems   Constitutional: Positive for fatigue  HENT: Positive for ear pain and tinnitus  Eyes: Positive for photophobia, pain and visual disturbance  Blurred Vision    Respiratory: Negative  Cardiovascular: Positive for leg swelling  Gastrointestinal: Positive for nausea  Endocrine: Negative  Genitourinary: Negative  Musculoskeletal: Positive for back pain, myalgias and neck pain  Joint Pain    Skin: Negative  Allergic/Immunologic: Negative  Neurological: Positive for dizziness, weakness, light-headedness, numbness and headaches  Hematological: Bruises/bleeds easily  Psychiatric/Behavioral: Positive for decreased concentration and sleep disturbance (Trouble falling and staying alseep )  The patient is nervous/anxious
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
## 2020-01-08 ENCOUNTER — TELEPHONE (OUTPATIENT)
Dept: NEUROLOGY | Facility: CLINIC | Age: 25
End: 2020-01-08

## 2020-01-21 ENCOUNTER — HOSPITAL ENCOUNTER (OUTPATIENT)
Dept: RADIOLOGY | Facility: HOSPITAL | Age: 25
Discharge: HOME | End: 2020-01-21
Attending: PSYCHIATRY & NEUROLOGY
Payer: COMMERCIAL

## 2020-01-21 DIAGNOSIS — G43.109 MIGRAINE WITH AURA AND WITHOUT STATUS MIGRAINOSUS, NOT INTRACTABLE: ICD-10-CM

## 2020-01-21 DIAGNOSIS — G43.711 INTRACTABLE CHRONIC MIGRAINE WITHOUT AURA AND WITH STATUS MIGRAINOSUS: ICD-10-CM

## 2020-01-21 PROCEDURE — 70553 MRI BRAIN STEM W/O & W/DYE: CPT

## 2020-01-21 PROCEDURE — A9585 GADOBUTROL INJECTION: HCPCS | Performed by: PSYCHIATRY & NEUROLOGY

## 2020-01-21 RX ADMIN — GADOBUTROL 7 ML: 604.72 INJECTION INTRAVENOUS at 13:35

## 2020-02-25 ENCOUNTER — OFFICE VISIT (OUTPATIENT)
Dept: URGENT CARE | Age: 25
End: 2020-02-25
Payer: COMMERCIAL

## 2020-02-25 VITALS
DIASTOLIC BLOOD PRESSURE: 64 MMHG | TEMPERATURE: 99.7 F | BODY MASS INDEX: 24.64 KG/M2 | RESPIRATION RATE: 20 BRPM | HEART RATE: 77 BPM | SYSTOLIC BLOOD PRESSURE: 150 MMHG | OXYGEN SATURATION: 98 % | WEIGHT: 155 LBS

## 2020-02-25 DIAGNOSIS — N39.0 ACUTE URINARY TRACT INFECTION: Primary | ICD-10-CM

## 2020-02-25 DIAGNOSIS — R35.0 URINARY FREQUENCY: ICD-10-CM

## 2020-02-25 LAB
SL AMB  POCT GLUCOSE, UA: NORMAL
SL AMB LEUKOCYTE ESTERASE,UA: NORMAL
SL AMB POCT BILIRUBIN,UA: NORMAL
SL AMB POCT BLOOD,UA: NORMAL
SL AMB POCT CLARITY,UA: NORMAL
SL AMB POCT COLOR,UA: YELLOW
SL AMB POCT KETONES,UA: NORMAL
SL AMB POCT NITRITE,UA: NORMAL
SL AMB POCT PH,UA: 6
SL AMB POCT SPECIFIC GRAVITY,UA: 1.02
SL AMB POCT URINE PROTEIN: 100
SL AMB POCT UROBILINOGEN: 0.2

## 2020-02-25 PROCEDURE — 99213 OFFICE O/P EST LOW 20 MIN: CPT | Performed by: FAMILY MEDICINE

## 2020-02-25 PROCEDURE — 87077 CULTURE AEROBIC IDENTIFY: CPT | Performed by: FAMILY MEDICINE

## 2020-02-25 PROCEDURE — 87186 SC STD MICRODIL/AGAR DIL: CPT | Performed by: FAMILY MEDICINE

## 2020-02-25 PROCEDURE — 81002 URINALYSIS NONAUTO W/O SCOPE: CPT | Performed by: FAMILY MEDICINE

## 2020-02-25 PROCEDURE — 87086 URINE CULTURE/COLONY COUNT: CPT | Performed by: FAMILY MEDICINE

## 2020-02-25 RX ORDER — CIPROFLOXACIN 500 MG/1
500 TABLET, FILM COATED ORAL EVERY 12 HOURS SCHEDULED
Qty: 20 TABLET | Refills: 0 | Status: SHIPPED | OUTPATIENT
Start: 2020-02-25 | End: 2020-03-06

## 2020-02-25 RX ORDER — VORTIOXETINE 10 MG/1
10 TABLET, FILM COATED ORAL DAILY
COMMUNITY
Start: 2020-02-18

## 2020-02-25 RX ORDER — NORGESTREL-ETHINYL ESTRADIOL 0.3-0.03MG
1 TABLET ORAL DAILY
COMMUNITY
Start: 2020-02-06

## 2020-02-25 NOTE — PATIENT INSTRUCTIONS
Concerned about patient's urinary frequency, burning, fever, hematuria, lower abdominal pain and cramping, bilateral mid back pain - strongly recommended the patient go to the hospital emergency department at this time  Cipro twice a day until finished (please take probiotics)  Increase fluids (cranberry juice)  Recheck/follow-up with family physician/GYN/urologist as discussed  [facility]  [phone]    Please go to the hospital emergency department if no improvement

## 2020-02-25 NOTE — PROGRESS NOTES
[facility] [facility] [employer] [facility]        NAME: Peggy Braun is a 26 y o  female  : 1995    MRN: [id number]  DATE: 2020  TIME: 4:40 PM    Assessment and Plan   Acute urinary tract infection [N39 0]  1  Acute urinary tract infection  ciprofloxacin (CIPRO) 500 mg tablet   2  Urinary frequency  POCT urine dip         Patient Instructions     Patient Instructions   Concerned about patient's urinary frequency, burning, fever, hematuria, lower abdominal pain and cramping, bilateral mid back pain - strongly recommended the patient go to the hospital emergency department at this time  Cipro twice a day until finished (please take probiotics)  Increase fluids (cranberry juice)  Recheck/follow-up with family physician/GYN/urologist as discussed  [facility]  [phone]    Please go to the hospital emergency department if no improvement  Follow up with PCP/GYN/Urologist in 1-2 days  Proceed to  ER if symptoms worsen  Chief Complaint     Chief Complaint   Patient presents with    Urinary Frequency     Burning, blood inthe urine, bilateral Kidney pain and lower abdominal pain/ cramps for 1 week   Fever    Abdominal Pain    Back Pain         History of Present Illness       Fever, urinary frequency, burning, blood in urine, bilateral mid back  kidneypain, lower abdominal pain and cramps for the past 1-1/2 weeks; patient states she saw a gynecologist at [facility] and finished a 3 day course of Bactrim this past Saturday (2020); patient states she has had similar infections in the past which have been helped with Cipro      Review of Systems   Review of Systems   Constitutional: Positive for fever  HENT: Negative  Respiratory: Negative  Cardiovascular: Negative  Gastrointestinal: Positive for abdominal pain  Genitourinary: Positive for dysuria and frequency  Musculoskeletal: Positive for back pain  Skin: Negative  Neurological: Negative            Current Medications       Current Outpatient Medications:     albuterol (PROVENTIL HFA,VENTOLIN HFA) 90 mcg/act inhaler, Inhale 1 puff every 4 (four) hours as needed for shortness of breath, Disp: , Rfl:     Certolizumab Pegol (CIMZIA SC), Inject under the skin 2 syringes every 4 weeks, Disp: , Rfl:     CIMZIA PREFILLED 2 X 200 MG/ML, , Disp: , Rfl:     ciprofloxacin (CIPRO) 500 mg tablet, Take 1 tablet (500 mg total) by mouth every 12 (twelve) hours for 20 doses, Disp: 20 tablet, Rfl: 0    clonazePAM (KlonoPIN) 0 5 mg tablet, Take 0 5 mg by mouth 2 (two) times a day as needed, Disp: , Rfl:     CRYSELLE-28 0 3-30 MG-MCG per tablet, Take 1 tablet by mouth daily, Disp: , Rfl:     magnesium oxide (MAG-OX) 400 mg, Take 1 tablet (400 mg total) by mouth daily (Patient not taking: Reported on 1/7/2020), Disp: 90 tablet, Rfl: 3    magnesium oxide (MAG-OX) 400 mg, Take 1 tablet (400 mg total) by mouth 2 (two) times a day, Disp: 60 tablet, Rfl: 6    Norgestrel-Ethinyl Estradiol (CRYSELLE-28 PO), Take 1 tablet by mouth daily  , Disp: , Rfl:     oxyCODONE-acetaminophen (PERCOCET) 5-325 mg per tablet, Take 0 25 tablets by mouth as needed for moderate pain , Disp: , Rfl:     prochlorperazine (COMPAZINE) 10 mg tablet, Take 1 tablet (10 mg total) by mouth every 6 (six) hours as needed for nausea or vomiting (Patient not taking: Reported on 1/7/2020), Disp: 12 tablet, Rfl: 3    Riboflavin (VITAMIN B-2) 100 MG TABS, 2 in the morning and 2 at bedtime, Disp: 120 tablet, Rfl: 6    rizatriptan (MAXALT) 10 MG tablet, Take 1 tablet (10 mg total) by mouth once as needed for migraine May repeat in 2 hours if needed  Max 2/24 hours, 9/month   (Patient not taking: Reported on 1/7/2020), Disp: 9 tablet, Rfl: 3    tiZANidine (ZANAFLEX) 4 mg tablet, Take 4 mg by mouth daily at bedtime, Disp: , Rfl: 1    TRINTELLIX 10 MG tablet, Take 10 mg by mouth daily, Disp: , Rfl:     Current Allergies     Allergies as of 02/25/2020 - Reviewed 02/25/2020 Allergen Reaction Noted    Bee pollen  06/01/2015    Pollen extract  06/01/2015            The following portions of the patient's history were reviewed and updated as appropriate: allergies, current medications, past family history, past medical history, past social history, past surgical history and problem list      Past Medical History:   Diagnosis Date    Anxiety     Depression     GERD (gastroesophageal reflux disease)     Head injury     Irritable bowel syndrome     Multiple gastric ulcers     Psoriasis        Past Surgical History:   Procedure Laterality Date    COLONOSCOPY         Family History   Problem Relation Age of Onset    Bipolar disorder Father     Alcohol abuse Father     Bipolar disorder Brother          Medications have been verified  Objective   /64 (BP Location: Left arm, Patient Position: Sitting, Cuff Size: Standard)   Pulse 77   Temp 99 7 °F (37 6 °C) (Temporal)   Resp 20   Wt 70 3 kg (155 lb)   LMP 02/09/2020 (Exact Date)   SpO2 98%   BMI 24 64 kg/m²        Physical Exam     Physical Exam   Constitutional: She is oriented to person, place, and time  She appears well-developed and well-nourished  Cardiovascular: Normal rate, regular rhythm and normal heart sounds  Pulmonary/Chest: Effort normal and breath sounds normal    Abdominal: Soft  Normal appearance  There is tenderness in the suprapubic area  Musculoskeletal:   Tenderness over the mid back/CVA areas bilaterally   Neurological: She is alert and oriented to person, place, and time  No nuchal rigidity   Skin:   Good color and turgor   Psychiatric: She has a normal mood and affect  Her behavior is normal    Nursing note and vitals reviewed

## 2020-02-27 LAB — BACTERIA UR CULT: ABNORMAL

## 2020-03-12 ENCOUNTER — TELEPHONE (OUTPATIENT)
Dept: NEUROLOGY | Facility: CLINIC | Age: 25
End: 2020-03-12

## 2020-03-12 ENCOUNTER — OFFICE VISIT (OUTPATIENT)
Dept: NEUROLOGY | Facility: CLINIC | Age: 25
End: 2020-03-12
Payer: COMMERCIAL

## 2020-03-12 VITALS
HEIGHT: 67 IN | DIASTOLIC BLOOD PRESSURE: 76 MMHG | WEIGHT: 156 LBS | SYSTOLIC BLOOD PRESSURE: 123 MMHG | BODY MASS INDEX: 24.48 KG/M2 | HEART RATE: 73 BPM

## 2020-03-12 DIAGNOSIS — D18.00 CAVERNOMA: Primary | ICD-10-CM

## 2020-03-12 DIAGNOSIS — G43.009 MIGRAINE WITHOUT AURA AND WITHOUT STATUS MIGRAINOSUS, NOT INTRACTABLE: ICD-10-CM

## 2020-03-12 DIAGNOSIS — R51.9 CHRONIC DAILY HEADACHE: ICD-10-CM

## 2020-03-12 PROCEDURE — 99214 OFFICE O/P EST MOD 30 MIN: CPT | Performed by: PHYSICIAN ASSISTANT

## 2020-03-12 RX ORDER — PROCHLORPERAZINE MALEATE 10 MG
10 TABLET ORAL EVERY 6 HOURS PRN
Qty: 12 TABLET | Refills: 3 | Status: SHIPPED | OUTPATIENT
Start: 2020-03-12 | End: 2021-10-19 | Stop reason: SDUPTHER

## 2020-03-12 RX ORDER — RIZATRIPTAN BENZOATE 10 MG/1
10 TABLET ORAL ONCE AS NEEDED
Qty: 9 TABLET | Refills: 3 | Status: SHIPPED | OUTPATIENT
Start: 2020-03-12 | End: 2021-10-19 | Stop reason: SDUPTHER

## 2020-03-12 RX ORDER — IBUPROFEN 800 MG/1
800 TABLET ORAL AS NEEDED
COMMUNITY
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
## 2020-03-12 NOTE — PROGRESS NOTES
Review of Systems   Constitutional: Positive for fatigue  HENT: Positive for tinnitus (bilateral ears )  Phonophobia    Eyes: Positive for photophobia and visual disturbance (blurry vision )  Cardiovascular: Negative  Gastrointestinal: Positive for nausea  Endocrine: Negative  Genitourinary: Negative  Musculoskeletal: Positive for neck pain  Skin: Negative  Allergic/Immunologic: Negative  Neurological: Positive for dizziness, light-headedness and headaches (2-4 migraines a week )  Hematological: Negative  Psychiatric/Behavioral: Positive for decreased concentration and sleep disturbance (difficulty falling and staying asleep )  The patient is nervous/anxious

## 2020-03-12 NOTE — TELEPHONE ENCOUNTER
Botox PA forms filled out faxed to [facility] on 3/12/2020  Will await approval/denial letter

## 2020-03-12 NOTE — ASSESSMENT & PLAN NOTE
Preventive therapy for headaches:   Reproductive age women: Should take folic acid daily when taking anti-seizure drugs especially Depakote   -Over-the-counter supplements: to decrease intensity and frequency of migraines  - Magnesium Oxide 400mg  Twice a day  If any diarrhea or upset stomach, decrease dose  as tolerated  - Vitamin B2 200 mg twice a day  May cause the urine to turn yellow which is normal for B 2 to do and is not a sign that you are dehydrated  -  Awaiting start of Botox  Abortive therapy for headaches:   -  At the onset of her moderate to severe headache (or aura) patient is to take rizatriptan 10 milligram  may repeat in 2 hours with prochlorperazine 10 milligram   Rizatriptan has a limit of 3 a week or 12 a month    Prochlorperazine has a limit of 10 a month

## 2020-03-12 NOTE — TELEPHONE ENCOUNTER
----- Message from Jasiel Wong PA-C sent at 3/12/2020 12:04 PM EDT -----  Regarding: Botox  Please auth for Botox 200 units Dr Jennie Elise, migraine :)

## 2020-03-16 NOTE — TELEPHONE ENCOUNTER
Received an approval letter from [facility]  Approval letter has been scanned into the patient's chart uinder "media" for future reference        Authorization information:    Authorization #: [id number]  Valid dates: 3/13/2020 until 3/13/2021  Please use [facility]

## 2020-03-16 NOTE — TELEPHONE ENCOUNTER
Jennifer,    Please contact the patient and schedule a New Start Botox appointment  It will be specialty pharmacy   Please let me know once the patient is scheduled so I can attach a referral     Thank you,    Kellie Pedraza

## 2020-03-19 ENCOUNTER — OFFICE VISIT (OUTPATIENT)
Dept: SLEEP CENTER | Facility: CLINIC | Age: 25
End: 2020-03-19
Payer: COMMERCIAL

## 2020-03-19 VITALS
SYSTOLIC BLOOD PRESSURE: 126 MMHG | WEIGHT: 159.2 LBS | BODY MASS INDEX: 24.99 KG/M2 | DIASTOLIC BLOOD PRESSURE: 80 MMHG | HEIGHT: 67 IN

## 2020-03-19 DIAGNOSIS — F51.01 PRIMARY INSOMNIA: ICD-10-CM

## 2020-03-19 PROCEDURE — 99244 OFF/OP CNSLTJ NEW/EST MOD 40: CPT | Performed by: INTERNAL MEDICINE

## 2020-03-19 RX ORDER — TRAZODONE HYDROCHLORIDE 50 MG/1
TABLET ORAL
Qty: 60 TABLET | Refills: 2 | Status: SHIPPED | OUTPATIENT
Start: 2020-03-19 | End: 2020-04-22
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
## 2020-03-20 ENCOUNTER — TELEPHONE (OUTPATIENT)
Dept: NON INVASIVE DIAGNOSTICS | Facility: HOSPITAL | Age: 25
End: 2020-03-20

## 2020-04-16 NOTE — TELEPHONE ENCOUNTER
Called patient and was unable to leave a message  I then called her father Edwardo Siemens from her communication consent and I scheduled her for 06/24/20 at 08:30am in the [facility] Location with Dr Jackie Lewis,   Please order as soon as possible as I will bring her in sooner if any cancellations  Thank you!

## 2020-04-20 ENCOUNTER — TELEPHONE (OUTPATIENT)
Dept: NEUROLOGY | Facility: CLINIC | Age: 25
End: 2020-04-20

## 2020-04-22 ENCOUNTER — TELEPHONE (OUTPATIENT)
Dept: SLEEP CENTER | Facility: CLINIC | Age: 25
End: 2020-04-22

## 2020-04-22 DIAGNOSIS — F51.04 PSYCHOPHYSIOLOGICAL INSOMNIA: Primary | ICD-10-CM

## 2020-04-22 RX ORDER — TIZANIDINE 4 MG/1
4 TABLET ORAL
Qty: 30 TABLET | Refills: 1 | Status: SHIPPED | OUTPATIENT
Start: 2020-04-22

## 2020-04-22 RX ORDER — DOXEPIN HYDROCHLORIDE 10 MG/1
10 CAPSULE ORAL
Qty: 30 CAPSULE | Refills: 3 | Status: SHIPPED | OUTPATIENT
Start: 2020-04-22 | End: 2020-08-31 | Stop reason: SDUPTHER

## 2020-05-07 ENCOUNTER — TELEPHONE (OUTPATIENT)
Dept: NON INVASIVE DIAGNOSTICS | Facility: HOSPITAL | Age: 25
End: 2020-05-07

## 2020-05-19 ENCOUNTER — TELEPHONE (OUTPATIENT)
Dept: NON INVASIVE DIAGNOSTICS | Facility: HOSPITAL | Age: 25
End: 2020-05-19

## 2020-05-26 ENCOUNTER — TELEPHONE (OUTPATIENT)
Dept: NEUROLOGY | Facility: CLINIC | Age: 25
End: 2020-05-26

## 2020-05-28 ENCOUNTER — PROCEDURE VISIT (OUTPATIENT)
Dept: NEUROLOGY | Facility: CLINIC | Age: 25
End: 2020-05-28
Payer: COMMERCIAL

## 2020-05-28 VITALS — TEMPERATURE: 97 F | DIASTOLIC BLOOD PRESSURE: 68 MMHG | HEART RATE: 76 BPM | SYSTOLIC BLOOD PRESSURE: 104 MMHG

## 2020-05-28 DIAGNOSIS — G43.709 CHRONIC MIGRAINE WITHOUT AURA WITHOUT STATUS MIGRAINOSUS, NOT INTRACTABLE: Primary | ICD-10-CM

## 2020-05-28 PROCEDURE — 64615 CHEMODENERV MUSC MIGRAINE: CPT | Performed by: PSYCHIATRY & NEUROLOGY

## 2020-06-16 ENCOUNTER — HOSPITAL ENCOUNTER (OUTPATIENT)
Dept: NON INVASIVE DIAGNOSTICS | Facility: HOSPITAL | Age: 25
Discharge: HOME | End: 2020-06-16
Attending: PSYCHIATRY & NEUROLOGY
Payer: COMMERCIAL

## 2020-06-16 DIAGNOSIS — R42 LIGHTHEADED: ICD-10-CM

## 2020-06-16 PROCEDURE — 93660 TILT TABLE EVALUATION: CPT

## 2020-06-18 DIAGNOSIS — R42 DIZZINESS: Primary | ICD-10-CM

## 2020-06-18 PROCEDURE — 93660 TILT TABLE EVALUATION: CPT | Performed by: INTERNAL MEDICINE

## 2020-06-19 ENCOUNTER — TELEPHONE (OUTPATIENT)
Dept: NEUROLOGY | Facility: CLINIC | Age: 25
End: 2020-06-19

## 2020-07-17 NOTE — TELEPHONE ENCOUNTER
Patient left message on receptionists line for refill    Attempted to contact patient, left message for patient to call to verify medication needing refill

## 2020-07-29 ENCOUNTER — TELEPHONE (OUTPATIENT)
Dept: NEUROLOGY | Facility: CLINIC | Age: 25
End: 2020-07-29

## 2020-07-29 NOTE — TELEPHONE ENCOUNTER
Patient canceled appt today 7/29/2020 which was 6 week follow up s/p Botox  Called patient and left voicemail requesting call back to reschedule ASAP   Needs to be before next Botox on 9/3/2020

## 2020-07-30 NOTE — TELEPHONE ENCOUNTER
2nd attempt  Voicemail left for patient requesting call back to reschedule appt  Needs to be prior to next Botox in Sept  I have 8/13/2020 at 230 or 315 available

## 2020-07-30 NOTE — TELEPHONE ENCOUNTER
Ptdixie called back to DAMIEN Villarreal 8/13 requested a morning apt, RS'ed for 8/17 @ 8am in CV    Confirmed date, time and location, offered to mail apt card, pt misti

## 2020-07-31 ENCOUNTER — TELEPHONE (OUTPATIENT)
Dept: NEUROLOGY | Facility: CLINIC | Age: 25
End: 2020-07-31

## 2020-07-31 NOTE — TELEPHONE ENCOUNTER
[address]- spoke with Kane Dickinson- I advised him I was calling to initiate a refill request for the patient's Botox order  Refill request initiated  Patient's consent is on file  Delivery pre-scheduled for 8/6/20 to the [facility] location  Will do a status check in 2 days

## 2020-07-31 NOTE — TELEPHONE ENCOUNTER
Type Date User Summary Attachment   General 07/22/2020 10:33 AM Jenna Clifford care coordination -   Note    Botox- authorization #: [id number]- valid from 3/13/2020 until 3/13/2021   Please use [facility]      Thank you,

## 2020-08-04 NOTE — TELEPHONE ENCOUNTER
85 Lisa Groves- spoke with Homero- she states the patient's Botox order is ready to be scheduled for delivery  Patient's consent is on file  Botox to be delivered on Thursday 8/6/2020 to the [facility] location via [facility] overnight- a signature will be required  Jennifer,    Please await the patient's Botox order and document once it has arrived  Please let me know if you do not receive the patient's order      Thank you,    Brandin

## 2020-08-06 NOTE — TELEPHONE ENCOUNTER
Botox number of units: 200 units  Botox quantity: 1  Arrived at what location: [facility]  Lot number: [id number]  Expiration Date: 03/2023    Placed in [address]  and logged

## 2020-08-20 NOTE — TELEPHONE ENCOUNTER
Patient called back and changed appt to see Carolyn Joshua instead on 8/25/2020 at 1000 am  Also changed Botox to 9/1/2020 as patient is due on that date

## 2020-08-20 NOTE — TELEPHONE ENCOUNTER
Patient missed appt on 8/17/2020 with Anaya  New appt made with Florencia on 8/25/2020 but [facility] now has opening same day 8/25/2020 at 1000 am  VM left for patient offering appt  Also discussed Botox appt on 9/3/2020, patient is due on 9/1/2020  Can move Botox to [facility] on 9/1/2020 at 0815 am if patient is interested

## 2020-08-25 ENCOUNTER — TELEPHONE (OUTPATIENT)
Dept: NEUROLOGY | Facility: CLINIC | Age: 25
End: 2020-08-25

## 2020-08-25 ENCOUNTER — TELEMEDICINE (OUTPATIENT)
Dept: NEUROLOGY | Facility: CLINIC | Age: 25
End: 2020-08-25
Payer: COMMERCIAL

## 2020-08-25 DIAGNOSIS — G44.82 HEADACHE ASSOCIATED WITH SEXUAL ACTIVITY: Primary | ICD-10-CM

## 2020-08-25 DIAGNOSIS — F41.1 GAD (GENERALIZED ANXIETY DISORDER): ICD-10-CM

## 2020-08-25 DIAGNOSIS — G43.009 MIGRAINE WITHOUT AURA AND WITHOUT STATUS MIGRAINOSUS, NOT INTRACTABLE: ICD-10-CM

## 2020-08-25 DIAGNOSIS — F43.10 PTSD (POST-TRAUMATIC STRESS DISORDER): ICD-10-CM

## 2020-08-25 PROCEDURE — 99213 OFFICE O/P EST LOW 20 MIN: CPT | Performed by: PHYSICIAN ASSISTANT
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
## 2020-08-25 NOTE — TELEPHONE ENCOUNTER
Attempted to reach patient to review medications and ROS prior to phone visit today 8/25/2020 with Anaya  No answer and  left for patient requesting call back ASAP

## 2020-08-25 NOTE — TELEPHONE ENCOUNTER
Called pt to schedule CTA and FU  Pt on vaction until 8/30 and doesn't have calender to make apt   Will reach out to pt when she returns 8/31    ----- Message from Lloyd Silva sent at 8/25/2020 11:03 AM EDT -----  Needs CTA head and neck please  Keep botox appts  F/u 1 year with me

## 2020-08-25 NOTE — ASSESSMENT & PLAN NOTE
Preventive therapy for headaches:   Reproductive age women: Should take folic acid daily when taking anti-seizure drugs especially Depakote   -Over-the-counter supplements: to decrease intensity and frequency of migraines  - Magnesium Oxide 400mg  Twice a day  If any diarrhea or upset stomach, decrease dose  as tolerated  - Vitamin B2 200 mg twice a day  May cause the urine to turn yellow which is normal for B 2 to do and is not a sign that you are dehydrated  - Botox every 3 months  Abortive therapy for headaches:   -  At the onset of her moderate to severe headache (or aura) patient is to take rizatriptan 10 milligram  may repeat in 2 hours with prochlorperazine 10 milligram   Rizatriptan has a limit of 3 a week or 12 a month    Prochlorperazine has a limit of 10 a month

## 2020-08-25 NOTE — ASSESSMENT & PLAN NOTE
Need to evaluate with CTA Of head and neck to exclude aneurysm and to exclude cervical and intracranial arterial dissection
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
## 2020-08-31 DIAGNOSIS — F51.04 PSYCHOPHYSIOLOGICAL INSOMNIA: ICD-10-CM

## 2020-09-01 ENCOUNTER — PROCEDURE VISIT (OUTPATIENT)
Dept: NEUROLOGY | Facility: CLINIC | Age: 25
End: 2020-09-01
Payer: COMMERCIAL

## 2020-09-01 VITALS — HEART RATE: 70 BPM | SYSTOLIC BLOOD PRESSURE: 124 MMHG | TEMPERATURE: 98 F | DIASTOLIC BLOOD PRESSURE: 59 MMHG

## 2020-09-01 DIAGNOSIS — G43.709 CHRONIC MIGRAINE WITHOUT AURA WITHOUT STATUS MIGRAINOSUS, NOT INTRACTABLE: Primary | ICD-10-CM

## 2020-09-01 PROCEDURE — 64615 CHEMODENERV MUSC MIGRAINE: CPT | Performed by: PHYSICIAN ASSISTANT

## 2020-09-01 RX ORDER — METHYLPREDNISOLONE 32 MG/1
32 TABLET ORAL DAILY
Qty: 3 TABLET | Refills: 0 | Status: SHIPPED | OUTPATIENT
Start: 2020-09-01

## 2020-09-01 NOTE — PROGRESS NOTES
Chemodenervation    Date/Time: 9/1/2020 8:28 AM  Performed by: Herminio Manzo PA-C  Authorized by: Herminio Manzo PA-C     Pre-procedure details:     Prepped With: Alcohol    Anesthesia (see MAR for exact dosages): Anesthesia method:  None  Procedure details:     Position:  Upright  Botox:     Botox Type:  Type A    Brand:  Botox    mL's of Botulinum Toxin:  200    Final Concentration per CC:  50 units    Needle Gauge:  30 G 2 5 inch  Procedures:     Botox Procedures: chronic headache      Indications: migraines    Injection Location:     Head / Face:  L superior cervical paraspinal, R superior cervical paraspinal, L , R , L frontalis, R frontalis, L medial occipitalis, R medial occipitalis, procerus, R temporalis, L temporalis, R superior trapezius and L superior trapezius    L  injection amount:  5 unit(s)    R  injection amount:  5 unit(s)    L lateral frontalis:  5 unit(s)    R lateral frontalis:  5 unit(s)    L medial frontalis:  5 unit(s)    R medial frontalis:  5 unit(s)    L temporalis injection amount:  20 unit(s)    R temporalis injection amount:  20 unit(s)    Procerus injection amount:  5 unit(s)    L medial occipitalis injection amount:  15 unit(s)    R medial occipitalis injection amount:  15 unit(s)    L superior cervical paraspinal injection amount:  10 unit(s)    R superior cervical paraspinal injection amount:  10 unit(s)    L superior trapezius injection amount:  15 unit(s)    R superior trapezius injection amount:  15 unit(s)  Total Units:     Total units used:  200    Total units discarded:  0  Post-procedure details:     Chemodenervation:  Chronic migraine    Facial Nerve Location[de-identified]  Bilateral facial nerve    Patient tolerance of procedure:   Tolerated well, no immediate complications  Comments:      5 units orbicularis oculi bilaterally  5 units splenius capitus  5 units occipitalis bilaterally  15 units frontalis   All medically necessary

## 2020-09-02 ENCOUNTER — TELEPHONE (OUTPATIENT)
Dept: NEUROLOGY | Facility: CLINIC | Age: 25
End: 2020-09-02

## 2020-09-02 DIAGNOSIS — M54.2 CERVICALGIA: Primary | ICD-10-CM

## 2020-09-02 RX ORDER — CYCLOBENZAPRINE HCL 5 MG
5 TABLET ORAL
Qty: 30 TABLET | Refills: 3 | Status: SHIPPED | OUTPATIENT
Start: 2020-09-02

## 2020-09-02 RX ORDER — DOXEPIN HYDROCHLORIDE 10 MG/1
10 CAPSULE ORAL
Qty: 30 CAPSULE | Refills: 0 | Status: SHIPPED | OUTPATIENT
Start: 2020-09-02

## 2020-09-02 NOTE — TELEPHONE ENCOUNTER
Please have her use her abortive medications: Rizatriptan and Compazine  Yes, this can happen after botox  Will send in a muscle relaxer for her

## 2020-09-02 NOTE — TELEPHONE ENCOUNTER
Patient's calling to say that after her botox injection she started to have a migraine  States that she noticed tension in base of skull and neck  Asking if this was normal after having botox  Explained that this is not necessarily normal but people have had occasions where the medication did not work for a month  Please advise    When did migraine start? 1  Location/Description: throbbing pain, squeezing pain  Pain scale: 8  Associated symptoms:nausea, sonophobia, photophobia, trouble speaking, neck stiffness  Precipitating factors: Thinks Botox  Alleviating factors: prescription medication Percocet 5 mg and rizitriptan  What medications have you tried for this migraine headache? prescription medications: Percocet and Rizatriptan (Maxalt)         Current migraine medications are confirmed as:  Compazine 10 mg: Unsure if she has medication  Rizatriptan 10 mg    Medications tried in the past? Olanzapine: made patient depressed  [phone]: Ok to leave a detailed message

## 2020-09-04 ENCOUNTER — TELEPHONE (OUTPATIENT)
Dept: NEUROLOGY | Facility: CLINIC | Age: 25
End: 2020-09-04

## 2020-09-04 NOTE — TELEPHONE ENCOUNTER
Pt called and states that she had botox on 9/1/20  She was told not to take a bath  Pt is asking how long does she have to wait?                    [phone] ok to leave detailed message

## 2020-09-08 ENCOUNTER — TELEPHONE (OUTPATIENT)
Dept: RADIOLOGY | Facility: HOSPITAL | Age: 25
End: 2020-09-08

## 2020-09-08 NOTE — NURSING NOTE
Called patient to verify allergy to IV CT contrast, if patient was given a script for the allergy prep and if they have already picked it up  Per patient she does already have the prep and has no questions regarding the taking of the medication  She will begin the night before the procedure  No further questions were verbalized by patient

## 2020-09-09 ENCOUNTER — CONSULT (OUTPATIENT)
Dept: CARDIOLOGY CLINIC | Facility: CLINIC | Age: 25
End: 2020-09-09
Payer: COMMERCIAL

## 2020-09-09 VITALS
HEART RATE: 65 BPM | WEIGHT: 165.6 LBS | DIASTOLIC BLOOD PRESSURE: 64 MMHG | SYSTOLIC BLOOD PRESSURE: 106 MMHG | HEIGHT: 67 IN | BODY MASS INDEX: 25.99 KG/M2

## 2020-09-09 DIAGNOSIS — Z72.89 ENGAGES IN VAPING: Primary | ICD-10-CM

## 2020-09-09 DIAGNOSIS — R42 DIZZINESS: ICD-10-CM

## 2020-09-09 PROCEDURE — 93000 ELECTROCARDIOGRAM COMPLETE: CPT | Performed by: INTERNAL MEDICINE

## 2020-09-09 PROCEDURE — 99244 OFF/OP CNSLTJ NEW/EST MOD 40: CPT | Performed by: INTERNAL MEDICINE

## 2020-09-09 NOTE — LETTER
September 9, 2020     [facility], [address] [place] [id number]    Patient: Romina Dias   YOB: 1995   Date of Visit: 9/9/2020       Dear Dr Edward Howell:    Thank you for referring Romina Dias to me for evaluation  Below are my notes for this consultation  If you have questions, please do not hesitate to call me  I look forward to following your patient along with you  Sincerely,        Denisha Adams MD        CC: DO Lois Moralez MD  9/9/2020  3:09 PM  Sign when Signing Visit                                             Cardiology Consultation     Romina Dias  [id number]  1995  [address] [id number]    1   Dizziness  Ambulatory referral to Cardiac Electrophysiology    POCT ECG         History of present illness  The patient has been sent to me by her neurologist for management of dizziness    The patient has a long history of multiple medical illnesses which include  - migraine - receives botulinum, rizatriptan, Tizanidine, cyclobenzaprine  - PTSD  - bipolar disorder  - history of fracture humerus, cervical spine injury, thoracic spine injury, sacral iliac injury - 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  - during training - on multiple medication for musculoskeletal pain  - tobacco abuse, quit 2 years ago  - vaping, will become 2 years on November 19, 2020    Patient does not complain of any anginal like chest pain or chest pressure  She is not complaining of worsening orthopnea or PND  She is not complaining of any leg swelling    Patient does not complain of any significant palpitation  She is not complaining of any presyncope or syncope  She has intermittent orthostatic lightheadedness    Patient describes dizziness as episodes, predominantly precipitated by orthostatic change  She will feel it come on suddenly, tubular vision  Usually she will lay down and it passes  Sometimes though it can last for hours    She does have significant migraines  She is on multiple therapy for the same  Recently cyclobenzaprine was added, and has produced some episodes of lightheadedness    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  This has been associated with significant injury all through her formative years, different parts of the spine and sacroiliac joint    She used to smoke till 2 years back but has quit for 2 years  She however is vaping for about 2 years now  She drinks up to 3 drinks a day, maybe 1-2 times weekly  She has tried to avoid it because of headache and also hangover  She has occasionally used marijuana  She does not use any other recreational drug    Her physical activities have significantly decreased since she stopped figure skating  She however does walk her dog 2-3 miles a day    She informs that there are skin color changes from time to time  More so when it is in a warm shower, her legs gets purplish         Episodes of dizziness  Patient Active Problem List   Diagnosis    SHAHRIAR (generalized anxiety disorder)    PTSD (post-traumatic stress disorder)    Bipolar II disorder ([facility] [address] )    Cervicalgia    Concussion    Disequilibrium    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[facility] [address] )    Insomnia    Migraine without aura and without status migrainosus, not intractable    Cavernoma    Headache associated with sexual activity     Past Medical History:   Diagnosis Date    Anxiety     Depression     GERD (gastroesophageal reflux disease)     Head injury     Irritable bowel syndrome     Multiple gastric ulcers     Psoriasis      Social History     Socioeconomic History    Marital status: Single     Spouse name: Not on file    Number of children: 0    Years of education: Not on file    Highest education level: High school graduate   Occupational History    Occupation:      Comment: [employer]   Social Needs    Financial resource strain: Not hard at all   [place] insecurity     Worry: Often true     Inability: Often true    Transportation needs     Medical: Yes     Non-medical: Yes   Tobacco Use    Smoking status: Current Every Day Smoker     Packs/day: 0 00     Types: E-Cigarettes    Smokeless tobacco: Never Used    Tobacco comment: vape   Substance and Sexual Activity    Alcohol use:  Yes     Alcohol/week: 3 0 standard drinks     Types: 2 Glasses of wine, 1 Shots of liquor per week     Frequency: Monthly or less     Drinks per session: 3 or 4     Comment: Socially     Drug use: No    Sexual activity: Not Currently   Lifestyle    Physical activity     Days per week: 3 days     Minutes per session: 60 min    Stress: Rather much   Relationships    Social connections     Talks on phone: Once a week     Gets together: Once a week     Attends [employer] service: Never     Active member of club or organization: No     Attends meetings of clubs or organizations: Never     Relationship status: Never     Intimate partner violence     Fear of current or ex partner: No     Emotionally abused: No     Physically abused: No     Forced sexual activity: No   Other Topics Concern    Not on file   Social History Narrative    Not on file      Family History   Problem Relation Age of Onset    Bipolar disorder Father     Alcohol abuse Father     Bipolar disorder Brother      Past Surgical History:   Procedure Laterality Date    COLONOSCOPY         Current Outpatient Medications:     albuterol (PROVENTIL HFA,VENTOLIN HFA) 90 mcg/act inhaler, Inhale 1 puff every 4 (four) hours as needed for shortness of breath, Disp: , Rfl:     BOTOX 200 units SOLR, , Disp: , Rfl:     Certolizumab Pegol (CIMZIA SC), Inject under the skin 2 syringes every 4 weeks, Disp: , Rfl:     CIMZIA PREFILLED 2 X 200 MG/ML, , Disp: , Rfl:     clonazePAM (KlonoPIN) 0 5 mg tablet, Take 0 5 mg by mouth 2 (two) times a day as needed, Disp: , Rfl:     CRYSELLE-28 0 3-30 MG-MCG per tablet, Take 1 tablet by mouth daily, Disp: , Rfl:     cyclobenzaprine (FLEXERIL) 5 mg tablet, Take 1 tablet (5 mg total) by mouth daily at bedtime, Disp: 30 tablet, Rfl: 3    doxepin (SINEquan) 10 mg capsule, Take 1 capsule (10 mg total) by mouth daily at bedtime, Disp: 30 capsule, Rfl: 0    ibuprofen (MOTRIN) 800 mg tablet, Take 800 mg by mouth as needed for mild pain (less then 3 times a week), Disp: , Rfl:     Norgestrel-Ethinyl Estradiol (CRYSELLE-28 PO), Take 1 tablet by mouth daily  , Disp: , Rfl:     oxyCODONE-acetaminophen (PERCOCET) 5-325 mg per tablet, Take 0 25 tablets by mouth as needed for moderate pain , Disp: , Rfl:     prochlorperazine (COMPAZINE) 10 mg tablet, Take 1 tablet (10 mg total) by mouth every 6 (six) hours as needed for nausea or vomiting, Disp: 12 tablet, Rfl: 3    tiZANidine (ZANAFLEX) 4 mg tablet, Take 1 tablet (4 mg total) by mouth daily at bedtime, Disp: 30 tablet, Rfl: 1    TRINTELLIX 10 MG tablet, Take 10 mg by mouth daily, Disp: , Rfl:     HYDROcodone-acetaminophen (NORCO) 5-325 mg per tablet, , Disp: , Rfl:     magnesium oxide (MAG-OX) 400 mg, Take 1 tablet (400 mg total) by mouth daily (Patient not taking: Reported on 1/7/2020), Disp: 90 tablet, Rfl: 3    magnesium oxide (MAG-OX) 400 mg, Take 1 tablet (400 mg total) by mouth 2 (two) times a day (Patient not taking: Reported on 3/12/2020), Disp: 60 tablet, Rfl: 6    methylPREDNISolone (MEDROL) 32 MG tablet, Take 1 tablet (32 mg total) by mouth daily Take 1 tab day before CT 1 tab 13 hours before and 1 tab 1 hour prior (Patient not taking: Reported on 9/9/2020), Disp: 3 tablet, Rfl: 0    Riboflavin (VITAMIN B-2) 100 MG TABS, 2 in the morning and 2 at bedtime (Patient not taking: Reported on 9/9/2020), Disp: 120 tablet, Rfl: 6    rizatriptan (MAXALT) 10 MG tablet, Take 1 tablet (10 mg total) by mouth once as needed for migraine May repeat in 2 hours if needed  Max 2/24 hours, 9/month  (Patient not taking: Reported on 9/9/2020), Disp: 9 tablet, Rfl: 3  Allergies   Allergen Reactions    Bee Pollen      Other reaction(s): Rhinitis (Runny Nose, Stuffy Nose, Sneezing)    Pollen Extract      Other reaction(s): Rhinitis (Runny Nose, Stuffy Nose, Sneezing)     Vitals:    09/09/20 0846 09/09/20 0854   BP: 92/70 106/64   BP Location: Left arm Left arm   Patient Position: Sitting Standing   Cuff Size: Standard Standard   Pulse: 65    Weight: 75 1 kg (165 lb 9 6 oz)    Height: 5' 6 5" (1 689 m)        Labs:  Lab Results   Component Value Date    K 3 6 09/03/2019     (H) 09/03/2019    CO2 23 09/03/2019    CO2 26 10/15/2018    BUN 7 09/03/2019    CREATININE 0 74 09/03/2019    GLUCOSE 87 10/15/2018    CALCIUM 7 3 (L) 09/03/2019     No results found for: CKTOTAL, CKMB, CKMBINDEX, TROPONINI  Lab Results   Component Value Date    WBC 6 36 09/03/2019    HGB 11 8 09/03/2019    HCT 35 6 09/03/2019    MCV 96 09/03/2019     09/03/2019     Lab Results   Component Value Date    TRIG 144 09/06/2019    HDL 48 (L) 09/06/2019     Imaging: No results found  Tilt table study  June 2020        Review of Systems:  Review of Systems   All other systems reviewed and are negative  As described in my history of present illness      Physical Exam:  Physical Exam  Vitals signs reviewed  Constitutional:       Appearance: Normal appearance  She is well-developed and normal weight  Comments: Not in any distress at the current time   HENT:      Head: Normocephalic and atraumatic  Right Ear: External ear normal       Left Ear: External ear normal       Nose: Nose normal       Mouth/Throat:      Pharynx: Uvula midline  Eyes:      General: Lids are normal       Extraocular Movements: Extraocular movements intact  Conjunctiva/sclera: Conjunctivae normal       Pupils: Pupils are equal, round, and reactive to light        Comments: No pallor  No cyanosis  No icterus   Neck:      Musculoskeletal: Normal range of motion and neck supple  No neck rigidity or muscular tenderness  Thyroid: No thyromegaly  Vascular: No carotid bruit or JVD  Trachea: Trachea normal       Comments: No jugular lymphadenopathy  Cardiovascular:      Rate and Rhythm: Normal rate and regular rhythm  Chest Wall: PMI is not displaced  Pulses: Normal pulses  Heart sounds: Normal heart sounds, S1 normal and S2 normal  No murmur  No friction rub  No gallop  No S3 or S4 sounds  Pulmonary:      Effort: Pulmonary effort is normal  No accessory muscle usage or respiratory distress  Breath sounds: Normal breath sounds  No decreased breath sounds, wheezing, rhonchi or rales  Chest:      Chest wall: No tenderness  Abdominal:      General: Abdomen is flat  Bowel sounds are normal  There is no distension  Palpations: Abdomen is soft  There is no hepatomegaly, splenomegaly or mass  Tenderness: There is no abdominal tenderness  Musculoskeletal: Normal range of motion  General: No swelling, tenderness or deformity  Right lower leg: No edema  Left lower leg: No edema  Lymphadenopathy:      Cervical: No cervical adenopathy  Skin:     Findings: No abrasion, erythema, lesion or rash  Nails: There is no clubbing  Neurological:      Mental Status: She is alert and oriented to person, place, and time  Comments: Facial symmetry is retained  Extraocular movements are retained  Head neck tongue and palate movement are retained and symmetric   Psychiatric:         Speech: Speech normal          Behavior: Behavior normal          Thought Content: Thought content normal          Discussion/Summary:    1   Dizziness    Symptoms have been there for quite some time, predominantly orthostatic  Episodes vary from few minutes to up to few hours    Patient was a competitive  when she was young - was extremely fit at that time  She has not done the same for about 7-8 years now    Water intake-6-8, 16 oz bottle a day  Does not drink caffeinated beverages of any significant amount      Multisystem symptoms  Headaches, brain fog  Anxiety, bipolar, posttraumatic stress  Dizziness  Varied musculoskeletal symptoms  Skin color changes with temperature      EKG reviewed by myself  Sinus with sinus arrhythmia, normal axis while 1 EKG does show left axis , narrow QRS  No evidence of long QT, short QT, pre-excitation, Brugada pattern, early repolarization      Symptoms and functional ability suggest a normal cardiac function  No echo available as yet            My recommendation for the patient are as follows:  1  Start Dajuan protocol  Both aerobic component as well as muscle training component  Complete 8 months  Patient can do the exercises at home, but need to have it reviewed by exercise therapist at least once every 2 months    2   If patient has symptoms of palpitation will follow-up with event monitor for 4 weeks  Patient does not complain about any significant palpitation at this time          Very detailed discussion done with the patient  A total of 70 minutes was spent in the case of which 90% of the time was with the patient

## 2020-09-09 NOTE — PROGRESS NOTES
Cardiology Consultation     Demario Parada  [id number]  1995  [facility]ART [facility] VASCULAR [facility] [place] [address] [address] [place] [id number]    1   Dizziness  Ambulatory referral to Cardiac Electrophysiology    POCT ECG         History of present illness  The patient has been sent to me by her neurologist for management of dizziness    The patient has a long history of multiple medical illnesses which include  - migraine - receives botulinum, rizatriptan, Tizanidine, cyclobenzaprine  - PTSD  - bipolar disorder  - history of fracture humerus, cervical spine injury, thoracic spine injury, sacral iliac injury - 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  - during training - on multiple medication for musculoskeletal pain  - tobacco abuse, quit 2 years ago  - vaping, will become 2 years on November 19, 2020    Patient does not complain of any anginal like chest pain or chest pressure  She is not complaining of worsening orthopnea or PND  She is not complaining of any leg swelling    Patient does not complain of any significant palpitation  She is not complaining of any presyncope or syncope  She has intermittent orthostatic lightheadedness    Patient describes dizziness as episodes, predominantly precipitated by orthostatic change  She will feel it come on suddenly, tubular vision  Usually she will lay down and it passes  Sometimes though it can last for hours    She does have significant migraines  She is on multiple therapy for the same  Recently cyclobenzaprine was added, and has produced some episodes of lightheadedness    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  This has been associated with significant injury all through her formative years, different parts of the spine and sacroiliac joint    She used to smoke till 2 years back but has quit for 2 years  She however is vaping for about 2 years now  She drinks up to 3 drinks a day, maybe 1-2 times weekly  She has tried to avoid it because of headache and also hangover  She has occasionally used marijuana  She does not use any other recreational drug    Her physical activities have significantly decreased since she stopped figure skating  She however does walk her dog 2-3 miles a day    She informs that there are skin color changes from time to time  More so when it is in a warm shower, her legs gets purplish         Episodes of dizziness  Patient Active Problem List   Diagnosis    SHAHRIAR (generalized anxiety disorder)    PTSD (post-traumatic stress disorder)    [facility]    Cervicalgia    Concussion    Disequilibrium    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[facility] )    Insomnia    Migraine without aura and without status migrainosus, not intractable    Cavernoma    Headache associated with sexual activity     Past Medical History:   Diagnosis Date    Anxiety     Depression     GERD (gastroesophageal reflux disease)     Head injury     Irritable bowel syndrome     Multiple gastric ulcers     Psoriasis      Social History     Socioeconomic History    Marital status: Single     Spouse name: Not on file    Number of children: 0    Years of education: Not on file    Highest education level: High school graduate   Occupational History    Occupation:      Comment: [employer]   Social Needs    Financial resource strain: Not hard at all   Renzo-Nathaniel insecurity     Worry: Often true     Inability: Often true    Transportation needs     Medical: Yes     Non-medical: Yes   Tobacco Use    Smoking status: Current Every Day Smoker     Packs/day: 0 00     Types: E-Cigarettes    Smokeless tobacco: Never Used    Tobacco comment: vape   Substance and Sexual Activity    Alcohol use:  Yes     Alcohol/week: 3 0 standard drinks     Types: 2 Glasses of wine, 1 Shots of liquor per week     Frequency: Monthly or less     Drinks per session: 3 or 4     Comment: Socially     Drug use: No    Sexual activity: Not Currently   Lifestyle    Physical activity     Days per week: 3 days     Minutes per session: 60 min    Stress: Rather much   Relationships    Social connections     Talks on phone: Once a week     Gets together: Once a week     Attends Synagogue service: Never     Active member of club or organization: No     Attends meetings of clubs or organizations: Never     Relationship status: Never     Intimate partner violence     Fear of current or ex partner: No     Emotionally abused: No     Physically abused: No     Forced sexual activity: No   Other Topics Concern    Not on file   Social History Narrative    Not on file      Family History   Problem Relation Age of Onset    Bipolar disorder Father     Alcohol abuse Father     Bipolar disorder Brother      Past Surgical History:   Procedure Laterality Date    COLONOSCOPY         Current Outpatient Medications:     albuterol (PROVENTIL HFA,VENTOLIN HFA) 90 mcg/act inhaler, Inhale 1 puff every 4 (four) hours as needed for shortness of breath, Disp: , Rfl:     BOTOX 200 units SOLR, , Disp: , Rfl:     Certolizumab Pegol (Jessa Rosario SC), Inject under the skin 2 syringes every 4 weeks, Disp: , Rfl:     CIMZIA PREFILLED 2 X 200 MG/ML, , Disp: , Rfl:     clonazePAM (KlonoPIN) 0 5 mg tablet, Take 0 5 mg by mouth 2 (two) times a day as needed, Disp: , Rfl:     CRYSELLE-28 0 3-30 MG-MCG per tablet, Take 1 tablet by mouth daily, Disp: , Rfl:     cyclobenzaprine (FLEXERIL) 5 mg tablet, Take 1 tablet (5 mg total) by mouth daily at bedtime, Disp: 30 tablet, Rfl: 3    doxepin (SINEquan) 10 mg capsule, Take 1 capsule (10 mg total) by mouth daily at bedtime, Disp: 30 capsule, Rfl: 0    ibuprofen (MOTRIN) 800 mg tablet, Take 800 mg by mouth as needed for mild pain (less then 3 times a week), Disp: , Rfl:     Norgestrel-Ethinyl Estradiol (CRYSELLE-28 PO), Take 1 tablet by mouth daily  , Disp: , Rfl:     oxyCODONE-acetaminophen (PERCOCET) 5-325 mg per tablet, Take 0 25 tablets by mouth as needed for moderate pain , Disp: , Rfl:     prochlorperazine (COMPAZINE) 10 mg tablet, Take 1 tablet (10 mg total) by mouth every 6 (six) hours as needed for nausea or vomiting, Disp: 12 tablet, Rfl: 3    tiZANidine (ZANAFLEX) 4 mg tablet, Take 1 tablet (4 mg total) by mouth daily at bedtime, Disp: 30 tablet, Rfl: 1    TRINTELLIX 10 MG tablet, Take 10 mg by mouth daily, Disp: , Rfl:     HYDROcodone-acetaminophen (NORCO) 5-325 mg per tablet, , Disp: , Rfl:     magnesium oxide (MAG-OX) 400 mg, Take 1 tablet (400 mg total) by mouth daily (Patient not taking: Reported on 1/7/2020), Disp: 90 tablet, Rfl: 3    magnesium oxide (MAG-OX) 400 mg, Take 1 tablet (400 mg total) by mouth 2 (two) times a day (Patient not taking: Reported on 3/12/2020), Disp: 60 tablet, Rfl: 6    methylPREDNISolone (MEDROL) 32 MG tablet, Take 1 tablet (32 mg total) by mouth daily Take 1 tab day before CT 1 tab 13 hours before and 1 tab 1 hour prior (Patient not taking: Reported on 9/9/2020), Disp: 3 tablet, Rfl: 0    Riboflavin (VITAMIN B-2) 100 MG TABS, 2 in the morning and 2 at bedtime (Patient not taking: Reported on 9/9/2020), Disp: 120 tablet, Rfl: 6    rizatriptan (MAXALT) 10 MG tablet, Take 1 tablet (10 mg total) by mouth once as needed for migraine May repeat in 2 hours if needed  Max 2/24 hours, 9/month   (Patient not taking: Reported on 9/9/2020), Disp: 9 tablet, Rfl: 3  Allergies   Allergen Reactions    Bee Pollen      Other reaction(s): Rhinitis (Runny Nose, Stuffy Nose, Sneezing)    Pollen Extract      Other reaction(s): Rhinitis (Runny Nose, Stuffy Nose, Sneezing)     Vitals:    09/09/20 0846 09/09/20 0854   BP: 92/70 106/64   BP Location: Left arm Left arm   Patient Position: Sitting Standing   Cuff Size: Standard Standard   Pulse: 65    Weight: 75 1 kg (165 lb 9 6 oz)    Height: 5' 6 5" (1 679 m)        Labs:  Lab Results   Component Value Date    K 3 6 09/03/2019     (H) 09/03/2019    CO2 23 09/03/2019    CO2 26 10/15/2018    BUN 7 09/03/2019    CREATININE 0 74 09/03/2019    GLUCOSE 87 10/15/2018    CALCIUM 7 3 (L) 09/03/2019     No results found for: CKTOTAL, CKMB, CKMBINDEX, TROPONINI  Lab Results   Component Value Date    WBC 6 36 09/03/2019    HGB 11 8 09/03/2019    HCT 35 6 09/03/2019    MCV 96 09/03/2019     09/03/2019     Lab Results   Component Value Date    TRIG 144 09/06/2019    HDL 48 (L) 09/06/2019     Imaging: No results found  Tilt table study  June 2020        Review of Systems:  Review of Systems   All other systems reviewed and are negative  As described in my history of present illness      Physical Exam:  Physical Exam  Vitals signs reviewed  Constitutional:       Appearance: Normal appearance  She is well-developed and normal weight  Comments: Not in any distress at the current time   HENT:      Head: Normocephalic and atraumatic  Right Ear: External ear normal       Left Ear: External ear normal       Nose: Nose normal       Mouth/Throat:      Pharynx: Uvula midline  Eyes:      General: Lids are normal       Extraocular Movements: Extraocular movements intact  Conjunctiva/sclera: Conjunctivae normal       Pupils: Pupils are equal, round, and reactive to light  Comments: No pallor  No cyanosis  No icterus   Neck:      Musculoskeletal: Normal range of motion and neck supple  No neck rigidity or muscular tenderness  Thyroid: No thyromegaly  Vascular: No carotid bruit or JVD  Trachea: Trachea normal       Comments: No jugular lymphadenopathy  Cardiovascular:      Rate and Rhythm: Normal rate and regular rhythm  Chest Wall: PMI is not displaced  Pulses: Normal pulses  Heart sounds: Normal heart sounds, S1 normal and S2 normal  No murmur  No friction rub  No gallop  No S3 or S4 sounds      Pulmonary: Effort: Pulmonary effort is normal  No accessory muscle usage or respiratory distress  Breath sounds: Normal breath sounds  No decreased breath sounds, wheezing, rhonchi or rales  Chest:      Chest wall: No tenderness  Abdominal:      General: Abdomen is flat  Bowel sounds are normal  There is no distension  Palpations: Abdomen is soft  There is no hepatomegaly, splenomegaly or mass  Tenderness: There is no abdominal tenderness  Musculoskeletal: Normal range of motion  General: No swelling, tenderness or deformity  Right lower leg: No edema  Left lower leg: No edema  Lymphadenopathy:      Cervical: No cervical adenopathy  Skin:     Findings: No abrasion, erythema, lesion or rash  Nails: There is no clubbing  Neurological:      Mental Status: She is alert and oriented to person, place, and time  Comments: Facial symmetry is retained  Extraocular movements are retained  Head neck tongue and palate movement are retained and symmetric   Psychiatric:         Speech: Speech normal          Behavior: Behavior normal          Thought Content: Thought content normal          Discussion/Summary:    1   Dizziness    Symptoms have been there for quite some time, predominantly orthostatic  Episodes vary from few minutes to up to few hours    Patient was a competitive  when she was young - was extremely fit at that time  She has not done the same for about 7-8 years now    Water intake-6-8, 16 oz bottle a day  Does not drink caffeinated beverages of any significant amount      Multisystem symptoms  Headaches, brain fog  Anxiety, bipolar, posttraumatic stress  Dizziness  Varied musculoskeletal symptoms  Skin color changes with temperature      EKG reviewed by myself  Sinus with sinus arrhythmia, normal axis while 1 EKG does show left axis , narrow QRS  No evidence of long QT, short QT, pre-excitation, Brugada pattern, early repolarization      Symptoms and functional ability suggest a normal cardiac function  No echo available as yet            My recommendation for the patient are as follows:  1  Start Dajuan protocol  Both aerobic component as well as muscle training component  Complete 8 months  Patient can do the exercises at home, but need to have it reviewed by exercise therapist at least once every 2 months    2   If patient has symptoms of palpitation will follow-up with event monitor for 4 weeks  Patient does not complain about any significant palpitation at this time          Very detailed discussion done with the patient  A total of 70 minutes was spent in the case of which 90% of the time was with the patient

## 2020-09-11 DIAGNOSIS — G44.82 HEADACHE ASSOCIATED WITH SEXUAL ACTIVITY: ICD-10-CM

## 2020-09-11 DIAGNOSIS — D18.00 CAVERNOMA: Primary | ICD-10-CM

## 2020-09-14 DIAGNOSIS — R42 LIGHTHEADEDNESS: ICD-10-CM

## 2020-09-14 DIAGNOSIS — R11.0 NAUSEA: ICD-10-CM

## 2020-09-14 DIAGNOSIS — R42 ORTHOSTATIC DIZZINESS: Primary | ICD-10-CM

## 2020-09-15 ENCOUNTER — TRANSCRIBE ORDERS (OUTPATIENT)
Dept: RADIOLOGY | Facility: HOSPITAL | Age: 25
End: 2020-09-15

## 2020-09-15 ENCOUNTER — HOSPITAL ENCOUNTER (OUTPATIENT)
Dept: RADIOLOGY | Facility: HOSPITAL | Age: 25
Discharge: HOME | End: 2020-09-15
Payer: COMMERCIAL

## 2020-09-15 DIAGNOSIS — D18.00 CAVERNOMA: ICD-10-CM

## 2020-09-15 DIAGNOSIS — G44.82 HEADACHE ASSOCIATED WITH SEXUAL ACTIVITY: ICD-10-CM

## 2020-09-15 PROCEDURE — 70496 CT ANGIOGRAPHY HEAD: CPT

## 2020-09-15 PROCEDURE — G1004 CDSM NDSC: HCPCS

## 2020-09-15 PROCEDURE — 70498 CT ANGIOGRAPHY NECK: CPT

## 2020-09-15 RX ADMIN — IODIXANOL 85 ML: 320 INJECTION, SOLUTION INTRAVASCULAR at 14:02

## 2020-09-19 ENCOUNTER — APPOINTMENT (EMERGENCY)
Dept: RADIOLOGY | Facility: HOSPITAL | Age: 25
End: 2020-09-19
Payer: COMMERCIAL

## 2020-09-19 ENCOUNTER — HOSPITAL ENCOUNTER (EMERGENCY)
Facility: HOSPITAL | Age: 25
Discharge: HOME | End: 2020-09-19
Attending: EMERGENCY MEDICINE | Admitting: EMERGENCY MEDICINE
Payer: COMMERCIAL

## 2020-09-19 VITALS
TEMPERATURE: 98.2 F | DIASTOLIC BLOOD PRESSURE: 92 MMHG | OXYGEN SATURATION: 100 % | HEART RATE: 98 BPM | RESPIRATION RATE: 16 BRPM | SYSTOLIC BLOOD PRESSURE: 137 MMHG

## 2020-09-19 DIAGNOSIS — S53.106A ELBOW DISLOCATION: Primary | ICD-10-CM

## 2020-09-19 PROCEDURE — 73090 X-RAY EXAM OF FOREARM: CPT

## 2020-09-19 PROCEDURE — NC001 PR NO CHARGE: Performed by: ORTHOPAEDIC SURGERY

## 2020-09-19 PROCEDURE — 73080 X-RAY EXAM OF ELBOW: CPT

## 2020-09-19 PROCEDURE — 96376 TX/PRO/DX INJ SAME DRUG ADON: CPT

## 2020-09-19 PROCEDURE — 99285 EMERGENCY DEPT VISIT HI MDM: CPT | Performed by: EMERGENCY MEDICINE

## 2020-09-19 PROCEDURE — 96375 TX/PRO/DX INJ NEW DRUG ADDON: CPT

## 2020-09-19 PROCEDURE — 99283 EMERGENCY DEPT VISIT LOW MDM: CPT

## 2020-09-19 PROCEDURE — 96374 THER/PROPH/DIAG INJ IV PUSH: CPT

## 2020-09-19 RX ORDER — FENTANYL CITRATE 50 UG/ML
50 INJECTION, SOLUTION INTRAMUSCULAR; INTRAVENOUS ONCE
Status: COMPLETED | OUTPATIENT
Start: 2020-09-19 | End: 2020-09-19

## 2020-09-19 RX ORDER — ONDANSETRON 2 MG/ML
4 INJECTION INTRAMUSCULAR; INTRAVENOUS ONCE
Status: COMPLETED | OUTPATIENT
Start: 2020-09-19 | End: 2020-09-19

## 2020-09-19 RX ADMIN — ONDANSETRON 4 MG: 2 INJECTION INTRAMUSCULAR; INTRAVENOUS at 04:33

## 2020-09-19 RX ADMIN — FENTANYL CITRATE 50 MCG: 50 INJECTION, SOLUTION INTRAMUSCULAR; INTRAVENOUS at 04:16

## 2020-09-19 RX ADMIN — FENTANYL CITRATE 50 MCG: 50 INJECTION, SOLUTION INTRAMUSCULAR; INTRAVENOUS at 03:28

## 2020-09-19 NOTE — CONSULTS
Orthopedics   Moralestianna Christiansen 26 y o  female MRN: [id number]  Unit/Bed#: ED 9      Chief Complaint:   right elbow pain    HPI:  26 y  o female s/p wrestling accident complaining of right elbow pain  Patient was wrestling with her boyfriend when she felt her elbow pop out of place  She immediately had pain and was unable to move the elbow  Pain is worse with palpation or movement, better with rest  She does have diffuse numbness throughout the hand  Patient is RHD and is not currently working  Review Of Systems:   · Skin: Normal  · Neuro: See HPI  · Musculoskeletal: See HPI  · 14 point review of systems negative except as stated above     Past Medical History:   Past Medical History:   Diagnosis Date    Anxiety     Depression     GERD (gastroesophageal reflux disease)     Head injury     Irritable bowel syndrome     Multiple gastric ulcers     Psoriasis        Past Surgical History:   Past Surgical History:   Procedure Laterality Date    COLONOSCOPY         Family History:  Family history reviewed and non-contributory  Family History   Problem Relation Age of Onset    Bipolar disorder Father     Alcohol abuse Father     Bipolar disorder Brother        Social History:  Social History     Socioeconomic History    Marital status: Single     Spouse name: None    Number of children: 0    Years of education: None    Highest education level: High school graduate   Occupational History    Occupation:      Comment: [employer]   Social Needs    Financial resource strain: Not hard at all   Renzo-Nathaniel insecurity     Worry: Often true     Inability: Often true    Transportation needs     Medical: Yes     Non-medical: Yes   Tobacco Use    Smoking status: Current Every Day Smoker     Packs/day: 0 00     Types: E-Cigarettes    Smokeless tobacco: Never Used    Tobacco comment: vape   Substance and Sexual Activity    Alcohol use:  Yes     Alcohol/week: 3 0 standard drinks     Types: 2 Glasses of wine, 1 Shots of liquor per week     Frequency: Monthly or less     Drinks per session: 3 or 4     Comment: Socially     Drug use: Yes     Types: Marijuana    Sexual activity: None   Lifestyle    Physical activity     Days per week: 3 days     Minutes per session: 60 min    Stress: Rather much   Relationships    Social connections     Talks on phone: Once a week     Gets together: Once a week     Attends Yarsani service: Never     Active member of club or organization: No     Attends meetings of clubs or organizations: Never     Relationship status: Never     Intimate partner violence     Fear of current or ex partner: No     Emotionally abused: No     Physically abused: No     Forced sexual activity: No   Other Topics Concern    None   Social History Narrative    None       Allergies: Allergies   Allergen Reactions    Contrast [Iodinated Diagnostic Agents] Shortness Of Breath    Bee Pollen      Other reaction(s): Rhinitis (Runny Nose, Stuffy Nose, Sneezing)    Pollen Extract      Other reaction(s): Rhinitis (Runny Nose, Stuffy Nose, Sneezing)           Labs:  0   Lab Value Date/Time    HCT 35 6 09/03/2019 1037    HCT 47 (H) 10/15/2018 2315    HCT 41 8 03/31/2018 2339    HCT 42 06/05/2017 2021    HCT 43 8 06/25/2016 1456    HGB 11 8 09/03/2019 1037    HGB 16 0 (H) 10/15/2018 2315    HGB 14 5 03/31/2018 2339    HGB 14 3 06/05/2017 2021    HGB 15 5 (H) 06/25/2016 1456    WBC 6 36 09/03/2019 1037    WBC 7 38 03/31/2018 2339    WBC 4 40 06/25/2016 1456       Meds:  No current facility-administered medications for this encounter       Current Outpatient Medications:     albuterol (PROVENTIL HFA,VENTOLIN HFA) 90 mcg/act inhaler, Inhale 1 puff every 4 (four) hours as needed for shortness of breath, Disp: , Rfl:     BOTOX 200 units SOLR, , Disp: , Rfl:     Certolizumab Pegol (CIMZIA SC), Inject under the skin 2 syringes every 4 weeks, Disp: , Rfl:     CIMZIA PREFILLED 2 X 200 MG/ML, , Disp: , Rfl:   clonazePAM (KlonoPIN) 0 5 mg tablet, Take 0 5 mg by mouth 2 (two) times a day as needed, Disp: , Rfl:     CRYSELLE-28 0 3-30 MG-MCG per tablet, Take 1 tablet by mouth daily, Disp: , Rfl:     cyclobenzaprine (FLEXERIL) 5 mg tablet, Take 1 tablet (5 mg total) by mouth daily at bedtime, Disp: 30 tablet, Rfl: 3    doxepin (SINEquan) 10 mg capsule, Take 1 capsule (10 mg total) by mouth daily at bedtime, Disp: 30 capsule, Rfl: 0    HYDROcodone-acetaminophen (NORCO) 5-325 mg per tablet, , Disp: , Rfl:     ibuprofen (MOTRIN) 800 mg tablet, Take 800 mg by mouth as needed for mild pain (less then 3 times a week), Disp: , Rfl:     magnesium oxide (MAG-OX) 400 mg, Take 1 tablet (400 mg total) by mouth daily (Patient not taking: Reported on 1/7/2020), Disp: 90 tablet, Rfl: 3    magnesium oxide (MAG-OX) 400 mg, Take 1 tablet (400 mg total) by mouth 2 (two) times a day (Patient not taking: Reported on 3/12/2020), Disp: 60 tablet, Rfl: 6    methylPREDNISolone (MEDROL) 32 MG tablet, Take 1 tablet (32 mg total) by mouth daily Take 1 tab day before CT 1 tab 13 hours before and 1 tab 1 hour prior (Patient not taking: Reported on 9/9/2020), Disp: 3 tablet, Rfl: 0    Norgestrel-Ethinyl Estradiol (CRYSELLE-28 PO), Take 1 tablet by mouth daily  , Disp: , Rfl:     oxyCODONE-acetaminophen (PERCOCET) 5-325 mg per tablet, Take 0 25 tablets by mouth as needed for moderate pain , Disp: , Rfl:     prochlorperazine (COMPAZINE) 10 mg tablet, Take 1 tablet (10 mg total) by mouth every 6 (six) hours as needed for nausea or vomiting, Disp: 12 tablet, Rfl: 3    Riboflavin (VITAMIN B-2) 100 MG TABS, 2 in the morning and 2 at bedtime (Patient not taking: Reported on 9/9/2020), Disp: 120 tablet, Rfl: 6    rizatriptan (MAXALT) 10 MG tablet, Take 1 tablet (10 mg total) by mouth once as needed for migraine May repeat in 2 hours if needed  Max 2/24 hours, 9/month   (Patient not taking: Reported on 9/9/2020), Disp: 9 tablet, Rfl: 3   tiZANidine (ZANAFLEX) 4 mg tablet, Take 1 tablet (4 mg total) by mouth daily at bedtime, Disp: 30 tablet, Rfl: 1    TRINTELLIX 10 MG tablet, Take 10 mg by mouth daily, Disp: , Rfl:     Blood Culture:   No results found for: BLOODCX    Wound Culture:   No results found for: WOUNDCULT    Ins and Outs:  No intake/output data recorded  Physical Exam:   /92 (BP Location: Left arm)   Pulse 98   Temp 98 2 °F (36 8 °C) (Oral)   Resp 16   SpO2 100%   Gen: Alert and oriented to person, place, time  HEENT: EOMI, eyes clear, moist mucus membranes, hearing intact  Respiratory: Bilateral chest rise  No audible wheezing found  Cardiovascular: Regular Rate and Rhythm  Abdomen: soft nontender/nondistended  Musculoskeletal: right upper extremity  · Skin pink, dry, and intact  · Decreased range of motion at elbow  · Sensation altered in radial, ulna, and median nerve distribution  · Motor intact to axillary, musculocutaneous, radial, ulnar, and median nerves  · 2+ cap refill    Radiology:   I personally reviewed the films  X-rays of right elbow shows radial head dislocation with associated capitellum fracture  Interval history  While attempting to position the patient's arm and comfort position, nurse noted a "clunk" in the elbow, after which patient had improved motion  A repeat exam patient had full elbow range of motion 0-140 degrees flexion/extension, 90/90 pronation/supination  Valgus/varus stress limited by pain, but grossly stable  Neurovascularly intact  Repeat images show reduction of radiocapitellar joint with small, minimally displaced capitellum fracture     _*_*_*_*_*_*_*_*_*_*_*_*_*_*_*_*_*_*_*_*_*_*_*_*_*_*_*_*_*_*_*_*_*_*_*_*_*_*_*_*_*    Assessment:  25 y  o female with left radiocapitellar dislocation with associated capitellum fracture  Patient underwent successful closed reduction by the nursing staff  No plan for any acute surgical intervention    Patient has been previously treated at [facility] for her history of elbow dislocations, so she may follow up with us or them 1 week after discharge      Plan:   · NWB left upper extremity  · Analgesics for pain  · F/U in 1 week  · Disposition:  Okay for discharge from ortho standpoint      Catarina Galeazzi, MD

## 2020-09-19 NOTE — DISCHARGE INSTRUCTIONS
You were seen in the emergency department for elbow dislocation  This was reduced in the emergency department in your splinted by Orthopedics  You should maintain your follow-up appointment with Orthopedics  Please, return to the emergency department if you experience new or worsening symptoms, recurrence of symptoms, or if your arm becomes cold and numb or if it has extensive swelling

## 2020-09-19 NOTE — ED PROVIDER NOTES
History  Chief Complaint   Patient presents with    Elbow Injury     Pt presents c/o R elbow pain  Pt states, "I was play fighting and my elbow popped out of my place " +2 radial at this time, -paresthesias  Patient is a 44-year-old female, with history significant for dislocation of her right elbow 2 months ago as well as prior fracture of this extremity, presents to the ED today with right elbow pain  45 minutes prior to arrival, she felt a  "pop" in this location while play fighting with her boyfriend  She subsequently developed significant 10/10 pain  Movement exacerbates her discomfort  She has not tried anything to remit her pain  Associated symptoms include numbness of her distal right upper extremity, nausea secondary to pain  Notably, patient states that she did not go to physical therapy after her most recent dislocation  Prior to Admission Medications   Prescriptions Last Dose Informant Patient Reported? Taking? BOTOX 200 units SOLR  Self Yes No   CIMZIA PREFILLED 2 X 200 MG/ML  Self Yes No   CRYSELLE-28 0 3-30 MG-MCG per tablet  Self Yes No   Sig: Take 1 tablet by mouth daily   Certolizumab Pegol (CIMZIA SC)  Self Yes No   Sig: Inject under the skin 2 syringes every 4 weeks   HYDROcodone-acetaminophen (NORCO) 5-325 mg per tablet  Self Yes No   Norgestrel-Ethinyl Estradiol (CRYSELLE-28 PO)  Self Yes No   Sig: Take 1 tablet by mouth daily     Riboflavin (VITAMIN B-2) 100 MG TABS  Self No No   Si in the morning and 2 at bedtime   Patient not taking: Reported on 2020   TRINTELLIX 10 MG tablet  Self Yes No   Sig: Take 10 mg by mouth daily   albuterol (PROVENTIL HFA,VENTOLIN HFA) 90 mcg/act inhaler  Self Yes No   Sig: Inhale 1 puff every 4 (four) hours as needed for shortness of breath   clonazePAM (KlonoPIN) 0 5 mg tablet  Self Yes No   Sig: Take 0 5 mg by mouth 2 (two) times a day as needed   cyclobenzaprine (FLEXERIL) 5 mg tablet  Self No No   Sig: Take 1 tablet (5 mg total) by mouth daily at bedtime   doxepin (SINEquan) 10 mg capsule  Self No No   Sig: Take 1 capsule (10 mg total) by mouth daily at bedtime   ibuprofen (MOTRIN) 800 mg tablet  Self Yes No   Sig: Take 800 mg by mouth as needed for mild pain (less then 3 times a week)   magnesium oxide (MAG-OX) 400 mg  Self No No   Sig: Take 1 tablet (400 mg total) by mouth daily   Patient not taking: Reported on 1/7/2020   magnesium oxide (MAG-OX) 400 mg  Self No No   Sig: Take 1 tablet (400 mg total) by mouth 2 (two) times a day   Patient not taking: Reported on 3/12/2020   methylPREDNISolone (MEDROL) 32 MG tablet  Self No No   Sig: Take 1 tablet (32 mg total) by mouth daily Take 1 tab day before CT 1 tab 13 hours before and 1 tab 1 hour prior   Patient not taking: Reported on 9/9/2020   oxyCODONE-acetaminophen (PERCOCET) 5-325 mg per tablet  Self Yes No   Sig: Take 0 25 tablets by mouth as needed for moderate pain    prochlorperazine (COMPAZINE) 10 mg tablet  Self No No   Sig: Take 1 tablet (10 mg total) by mouth every 6 (six) hours as needed for nausea or vomiting   rizatriptan (MAXALT) 10 MG tablet  Self No No   Sig: Take 1 tablet (10 mg total) by mouth once as needed for migraine May repeat in 2 hours if needed  Max 2/24 hours, 9/month  Patient not taking: Reported on 9/9/2020   tiZANidine (ZANAFLEX) 4 mg tablet  Self No No   Sig: Take 1 tablet (4 mg total) by mouth daily at bedtime      Facility-Administered Medications: None       Past Medical History:   Diagnosis Date    Anxiety     Depression     GERD (gastroesophageal reflux disease)     Head injury     Irritable bowel syndrome     Multiple gastric ulcers     Psoriasis        Past Surgical History:   Procedure Laterality Date    COLONOSCOPY         Family History   Problem Relation Age of Onset    Bipolar disorder Father     Alcohol abuse Father     Bipolar disorder Brother      I have reviewed and agree with the history as documented      E-Cigarette/Vaping    E-Cigarette Use Current Every Day User     Start Date 11/19/19     Cartridges/Day 1 week       E-Cigarette/Vaping Substances    Nicotine Yes     THC No     CBD No     Flavoring Yes      Social History     Tobacco Use    Smoking status: Current Every Day Smoker     Packs/day: 0 00     Types: E-Cigarettes    Smokeless tobacco: Never Used    Tobacco comment: vape   Substance Use Topics    Alcohol use: Yes     Alcohol/week: 3 0 standard drinks     Types: 2 Glasses of wine, 1 Shots of liquor per week     Frequency: Monthly or less     Drinks per session: 3 or 4     Comment: Socially     Drug use: Yes     Types: Marijuana        Review of Systems   Constitutional: Negative for fever  HENT: Negative for sore throat  Eyes: Negative for visual disturbance  Respiratory: Negative for shortness of breath  Cardiovascular: Negative for chest pain  Gastrointestinal: Negative for abdominal pain  Endocrine: Negative for polyuria  Genitourinary: Negative for dysuria  Musculoskeletal: Negative for gait problem  Skin: Negative for rash  Neurological: Positive for numbness  Hematological: Negative for adenopathy  Psychiatric/Behavioral: Negative for confusion  Physical Exam  ED Triage Vitals [09/19/20 0235]   Temperature Pulse Respirations Blood Pressure SpO2   98 2 °F (36 8 °C) 98 16 137/92 100 %      Temp Source Heart Rate Source Patient Position - Orthostatic VS BP Location FiO2 (%)   Oral Monitor Sitting Left arm --      Pain Score       Worst Possible Pain             Orthostatic Vital Signs  Vitals:    09/19/20 0235   BP: 137/92   Pulse: 98   Patient Position - Orthostatic VS: Sitting       Physical Exam  Vitals signs and nursing note reviewed  Constitutional:       Appearance: Normal appearance  She is not toxic-appearing  Comments: Patient is uncomfortable appearing in bed  Her right arm is hanging straight down off the side of the bed  It is purplish in color     HENT: Head: Normocephalic and atraumatic  Right Ear: External ear normal       Left Ear: External ear normal       Nose: Nose normal  No congestion  Eyes:      Conjunctiva/sclera: Conjunctivae normal    Cardiovascular:      Pulses: Normal pulses  Comments: 2+ radial pulses bilaterally  Pulmonary:      Effort: Pulmonary effort is normal  No respiratory distress  Abdominal:      General: There is no distension  Tenderness: There is no abdominal tenderness  There is no guarding or rebound  Musculoskeletal:         General: Tenderness and deformity present  Right lower leg: No edema  Left lower leg: No edema  Comments: Patient has exquisite tenderness to palpation over the posterior aspect of her right elbow  She is not able to flex her elbow actively  Passive flexion elicits a great pain  Patient has full active range of motion of her right shoulder and wrist    Skin:     General: Skin is dry  Capillary Refill: Capillary refill takes less than 2 seconds  Comments: Distal right upper extremity is cool to touch  Neurological:      Mental Status: She is alert  Comments: Patient is speaking clearly in complete sentences  She is answering appropriately and able to follow commands  Median, radial, ulnar motor function intact of the right upper extremity    Patient reports sensory deficit to light touch of the ulnar distribution of her right upper extremity   Psychiatric:         Mood and Affect: Mood normal          Behavior: Behavior normal          ED Medications  Medications   fentanyl citrate (PF) 100 MCG/2ML 50 mcg (50 mcg Intravenous Given 9/19/20 5048)   fentanyl citrate (PF) 100 MCG/2ML 50 mcg (50 mcg Intravenous Given 9/19/20 6646)   ondansetron (ZOFRAN) injection 4 mg (4 mg Intravenous Given 9/19/20 0433)       Diagnostic Studies  Results Reviewed     None                 XR elbow 3+ vw RIGHT   ED Interpretation by Luiz Vicente MD (09/19 0411) Dislocated radial head      XR elbow 3+ vw right    (Results Pending)   XR forearm 2 vw right    (Results Pending)         Procedures  Procedures      ED Course  ED Course as of Sep 19 0558   Sat Sep 19, 2020   8315 Patient felt her elbow pop back into place when it was being flexed to place under a pillow  On repeat examination, patient continues to have 2+ radial pulses in the right hand  Her medial, radial, ulnar motor function is intact  She also has sensation to light touch in all these distributions  Plan to acquire repeat x-rays to confirm reduction  0501 She reports improvement in her pain  [address] 111 by nursing that, per Orthopedics, patient is ready for discharge  Patient has follow-up scheduled with Orthopedics  8408 Patient has neither questions nor concerns after being given discharge instructions and return precautions  SBIRT 20yo+      Most Recent Value   SBIRT (22 yo +)   In order to provide better care to our patients, we are screening all of our patients for alcohol and drug use  Would it be okay to ask you these screening questions? Yes Filed at: 09/19/2020 0240   Initial Alcohol Screen: US AUDIT-C    1  How often do you have a drink containing alcohol? 1 Filed at: 09/19/2020 0240   2  How many drinks containing alcohol do you have on a typical day you are drinking? 2 Filed at: 09/19/2020 0240   3b  FEMALE Any Age, or MALE 65+: How often do you have 4 or more drinks on one occassion? 0 Filed at: 09/19/2020 0240   Audit-C Score  3 Filed at: 09/19/2020 0240                  MDM  Number of Diagnoses or Management Options  Diagnosis management comments: Patient is a 45-year-old female, with history significant for dislocation of her right elbow 2 months ago as well as prior fracture of this extremity, presents to the ED today with right elbow pain    45 minutes prior to arrival, she felt a  "pop" in this location while play fighting with her boyfriend  She subsequently developed significant 10/10 pain  Patient is currently afebrile and hemodynamically stable  Her exam is notable for an extended right upper extremity is cool to the touch distally  There is diminished sensation to light touch over the ulnar distribution  He has 2+ radial pulses  Presentation is concerning for dislocation, fracture  Will investigate with x-ray and provide fentanyl for analgesia  Disposition  Final diagnoses:   Elbow dislocation     Time reflects when diagnosis was documented in both MDM as applicable and the Disposition within this note     Time User Action Codes Description Comment    9/19/2020  5:50 AM Darylene Shields Add [Y28 [id number]] Elbow dislocation       ED Disposition     ED Disposition Condition Date/Time Comment    Discharge Stable Sat Sep 19, 2020  5:52 AM Martha Espino discharge to home/self care  Follow-up Information     Follow up With Specialties Details Why Contact Info    Mora Lowe MD  Schedule an appointment as soon as possible for a visit   [address] [address]      Mora Lowe MD  Schedule an appointment as soon as possible for a visit  As needed [address] [address]   [phone]            Patient's Medications   Discharge Prescriptions    No medications on file     No discharge procedures on file  PDMP Review     None           ED Provider  Attending physically available and evaluated Martha Espino  I managed the patient along with the ED Attending      Electronically Signed by         Yessi Hammond MD  09/19/20 0462

## 2020-09-19 NOTE — ED ATTENDING ATTESTATION
9/19/2020  IAnton DO, saw and evaluated the patient  I have discussed the patient with the resident/non-physician practitioner and agree with the resident's/non-physician practitioner's findings, Plan of Care, and MDM as documented in the resident's/non-physician practitioner's note, except where noted  All available labs and Radiology studies were reviewed  I was present for key portions of any procedure(s) performed by the resident/non-physician practitioner and I was immediately available to provide assistance  At this point I agree with the current assessment done in the Emergency Department  I have conducted an independent evaluation of this patient a history and physical is as follows:    20yo female presents with right elbow pain  PT states she was "play wrestling" with her boyfriend and got elbow pain  Has h/o elbow dislocation  Denies other injury and complaint  On exam - nad, heart reg, no resp distress, no tenderness at shoulder but tender right elbow, unable to flex elbow, 2+ radial pulses    Plan - xray elbow, pain control    ED Course         Critical Care Time  Procedures

## 2020-10-01 ENCOUNTER — TELEPHONE (OUTPATIENT)
Dept: SLEEP CENTER | Facility: CLINIC | Age: 25
End: 2020-10-01

## 2020-10-21 ENCOUNTER — TELEPHONE (OUTPATIENT)
Dept: NEUROLOGY | Facility: CLINIC | Age: 25
End: 2020-10-21

## 2020-11-14 ENCOUNTER — HOSPITAL ENCOUNTER (EMERGENCY)
Facility: HOSPITAL | Age: 25
Discharge: HOME | End: 2020-11-14
Attending: EMERGENCY MEDICINE
Payer: COMMERCIAL

## 2020-11-14 VITALS
SYSTOLIC BLOOD PRESSURE: 102 MMHG | HEART RATE: 86 BPM | RESPIRATION RATE: 18 BRPM | DIASTOLIC BLOOD PRESSURE: 59 MMHG | TEMPERATURE: 97.6 F | OXYGEN SATURATION: 97 %

## 2020-11-14 DIAGNOSIS — R11.2 NAUSEA & VOMITING: ICD-10-CM

## 2020-11-14 DIAGNOSIS — F10.929 ALCOHOL INTOXICATION (HCC): Primary | ICD-10-CM

## 2020-11-14 PROCEDURE — 99284 EMERGENCY DEPT VISIT MOD MDM: CPT | Performed by: EMERGENCY MEDICINE

## 2020-11-14 PROCEDURE — 99284 EMERGENCY DEPT VISIT MOD MDM: CPT

## 2020-11-14 RX ORDER — ONDANSETRON 4 MG/1
4 TABLET, ORALLY DISINTEGRATING ORAL ONCE
Status: COMPLETED | OUTPATIENT
Start: 2020-11-14 | End: 2020-11-14

## 2020-11-14 RX ADMIN — ONDANSETRON 4 MG: 4 TABLET, ORALLY DISINTEGRATING ORAL at 01:13

## 2020-11-19 ENCOUNTER — TELEPHONE (OUTPATIENT)
Dept: NEUROLOGY | Facility: CLINIC | Age: 25
End: 2020-11-19

## 2020-12-03 ENCOUNTER — PROCEDURE VISIT (OUTPATIENT)
Dept: NEUROLOGY | Facility: CLINIC | Age: 25
End: 2020-12-03
Payer: COMMERCIAL

## 2020-12-03 VITALS — HEART RATE: 78 BPM | DIASTOLIC BLOOD PRESSURE: 74 MMHG | SYSTOLIC BLOOD PRESSURE: 115 MMHG | TEMPERATURE: 98.6 F

## 2020-12-03 DIAGNOSIS — G43.709 CHRONIC MIGRAINE WITHOUT AURA WITHOUT STATUS MIGRAINOSUS, NOT INTRACTABLE: Primary | ICD-10-CM

## 2020-12-03 PROCEDURE — 64615 CHEMODENERV MUSC MIGRAINE: CPT | Performed by: PHYSICIAN ASSISTANT

## 2020-12-07 ENCOUNTER — TELEPHONE (OUTPATIENT)
Dept: NEUROLOGY | Facility: CLINIC | Age: 25
End: 2020-12-07

## 2020-12-07 DIAGNOSIS — M54.2 CERVICALGIA: Primary | ICD-10-CM

## 2020-12-07 RX ORDER — DEXAMETHASONE 1 MG
1 TABLET ORAL DAILY PRN
Qty: 5 TABLET | Refills: 0 | Status: SHIPPED | OUTPATIENT
Start: 2020-12-07

## 2021-01-21 ENCOUNTER — TELEPHONE (OUTPATIENT)
Dept: NEUROLOGY | Facility: CLINIC | Age: 26
End: 2021-01-21

## 2021-01-21 NOTE — TELEPHONE ENCOUNTER
Type Date User Summary Attachment   General 01/18/2021  3:38 PM Ramírez Costa care coordination  -   Note    Botox- authorization #: [id number]- valid from 3/13/2020 until 3/13/2021   Please use [facility]      Thank you     Yanni Rollins

## 2021-01-29 NOTE — TELEPHONE ENCOUNTER
[address]- spoke with Delfina Gallegos- I advised her I was calling to initiate a refill request on the patient's Botox order  She states the patient's Botox order is ready to be scheduled for delivery  Patient's consent is on file  Botox to be delivered on Tuesday 2/2/2021 to the [facility] location via [facility] overnight- a signature will be required  Janine Barrett,    Please await the patient's Botox order and document once it has arrived  Please let me know if you do not receive the patient's Botox order      Thank you    Maldonado Meyers

## 2021-02-09 NOTE — TELEPHONE ENCOUNTER
Botox number of units: 200   Botox quantity: 1 - 200 unit vial    Arrived at what location: [facility]   Lot number: [id number]  Expiration Date: 10/2023  Appt notes indicate correct medication: yes    Medication logged and Stored

## 2021-02-18 ENCOUNTER — TELEPHONE (OUTPATIENT)
Dept: NEUROLOGY | Facility: CLINIC | Age: 26
End: 2021-02-18

## 2021-02-18 NOTE — TELEPHONE ENCOUNTER
Called to remind patient of their upcoming appointment with [facility] in [facility]  Voicemail left to advise patient to call back with any urgent symptoms or concerns  Patient also made aware that we will be calling back two days prior to appointment to complete the COVID screening

## 2021-02-18 NOTE — PSYCH
Subjective:     Patient ID: Ketan Holden is a 22 y o  female  [facility] Clinical Progress Notes      Specialized Services Documentation  Therapist must complete separate progress note for each specific clinical activity in which the individual participated during the day  Allied Therapy   [id number] Excused due to case management     Tx Plan Objective: na, Therapist:  Willow ARZATE left message to call back & schedule a follow up with Dr. Arauz.

## 2021-02-19 NOTE — TELEPHONE ENCOUNTER
Pt left a voicemail message Thurs at 1:01 pm confirming botox appt on 3/4/21 at 0830  States that she is still getting body aches and daily migraines   Need more info     Called and Left a message on pt's answering machine for a call back      [phone]

## 2021-02-23 NOTE — TELEPHONE ENCOUNTER
Pt called back  She states that she is   Having trouble with psoriatic arthritis medication and in the process of switching medications  Also thinks that she has an Infection in her tooth or something going on, see dentist tomorrow to get this looked at  Also almost due for botox, thinks that botox is wearing off and getting more migraines due to this and everything else that is going on  States that she will wait to have botox  Having an Aura to front of head and tempols but not currently having a migraine   She will call back with any issues prior to botox appt

## 2021-03-16 ENCOUNTER — TELEPHONE (OUTPATIENT)
Dept: NEUROLOGY | Facility: CLINIC | Age: 26
End: 2021-03-16

## 2021-03-18 ENCOUNTER — TELEPHONE (OUTPATIENT)
Dept: NEUROLOGY | Facility: CLINIC | Age: 26
End: 2021-03-18

## 2021-03-18 NOTE — TELEPHONE ENCOUNTER
Patient lvm 7:30a 3/18 that she cannot make appt - please call back to reschedule botox   Patient can be contacted [phone]

## 2021-03-22 ENCOUNTER — PROCEDURE VISIT (OUTPATIENT)
Dept: NEUROLOGY | Facility: CLINIC | Age: 26
End: 2021-03-22
Payer: COMMERCIAL

## 2021-03-22 VITALS — SYSTOLIC BLOOD PRESSURE: 131 MMHG | HEART RATE: 75 BPM | DIASTOLIC BLOOD PRESSURE: 74 MMHG | TEMPERATURE: 99.2 F

## 2021-03-22 DIAGNOSIS — G43.009 MIGRAINE WITHOUT AURA AND WITHOUT STATUS MIGRAINOSUS, NOT INTRACTABLE: Primary | ICD-10-CM

## 2021-03-22 PROCEDURE — 64615 CHEMODENERV MUSC MIGRAINE: CPT | Performed by: PHYSICIAN ASSISTANT

## 2021-03-22 NOTE — TELEPHONE ENCOUNTER
Called and spoke with pt  R/s pt's appt for 4/14 at 2:00 pm with Anaya which was the first available, pt was due on 3/18 but needed to cancel  [facility], please keep an eye out for a sooner appt for the pt and call if there is any sooner appts available  Thank you!

## 2021-03-22 NOTE — PROGRESS NOTES
Universal Protocol   Consent: Verbal consent obtained  Written consent obtained  Risks and benefits: risks, benefits and alternatives were discussed  Consent given by: patient  Time out: Immediately prior to procedure a "time out" was called to verify the correct patient, procedure, equipment, support staff and site/side marked as required  Patient understanding: patient states understanding of the procedure being performed  Patient consent: the patient's understanding of the procedure matches consent given  Procedure consent: procedure consent matches procedure scheduled  Relevant documents: relevant documents present and verified  Patient identity confirmed: verbally with patient        Chemodenervation     Date/Time 3/22/2021 2:08 PM     Performed by  Angeles Alcantar PA-C     Authorized by Angeles Alcantar PA-C        Pre-procedure details      Prepped With: Alcohol     Anesthesia  (see MAR for exact dosages):      Anesthesia method:  None   Procedure details     Position:  Upright   Botox     Botox Type:  Type A    Brand:  Botox    mL's of Botulinum Toxin:  200    Final Concentration per CC:  50 units    Needle Gauge:  30 G 2 5 inch   Procedures     Botox Procedures: chronic headache      Indications: migraines     Injection Location      Head / Face:  L superior cervical paraspinal, R superior cervical paraspinal, L , R , L frontalis, R frontalis, L medial occipitalis, R medial occipitalis, procerus, R temporalis, L temporalis, R superior trapezius and L superior trapezius    L  injection amount:  5 unit(s)    R  injection amount:  5 unit(s)    L lateral frontalis:  5 unit(s)    R lateral frontalis:  5 unit(s)    L medial frontalis:  5 unit(s)    R medial frontalis:  5 unit(s)    L temporalis injection amount:  20 unit(s)    R temporalis injection amount:  20 unit(s)    Procerus injection amount:  5 unit(s)    L medial occipitalis injection amount:  15 unit(s)    R medial occipitalis injection amount:  15 unit(s)    L superior cervical paraspinal injection amount:  10 unit(s)    R superior cervical paraspinal injection amount:  10 unit(s)    L superior trapezius injection amount:  15 unit(s)    R superior trapezius injection amount:  15 unit(s)   Total Units     Total units used:  200    Total units discarded:  0   Post-procedure details      Chemodenervation:  Chronic migraine    Facial Nerve Location[de-identified]  Bilateral facial nerve    Patient tolerance of procedure:   Tolerated well, no immediate complications   Comments      5 units orbicularis oculi bilaterally  25 units trapezius  10 units frontalis   All medically necessary

## 2021-03-24 ENCOUNTER — TELEPHONE (OUTPATIENT)
Dept: OTHER | Facility: OTHER | Age: 26
End: 2021-03-24

## 2021-05-12 ENCOUNTER — TELEPHONE (OUTPATIENT)
Dept: NEUROLOGY | Facility: CLINIC | Age: 26
End: 2021-05-12

## 2021-05-12 NOTE — TELEPHONE ENCOUNTER
Type Date User Summary Attachment   General 05/12/2021  7:05 AM Lexy Lawson care coordination  -   Note    Botox- authorization #: [id number]- valid from 5/1/2021 until 5/1/2022   Please use [facility]      Thank you     Colten Leon

## 2021-05-12 NOTE — TELEPHONE ENCOUNTER
Patient is scheduled with Araceli Voss on 6/28/2021 in the [facility] location

## 2021-05-13 NOTE — TELEPHONE ENCOUNTER
Received a call from [facility], requesting a prescription for patient's botox medication  Message states that [facility] is "designated pharmacy for this particular medication" they just received referral from patient's insurance so they are calling to obtain prescription    Angelica Sherwood, please follow up      [address]

## 2021-05-17 NOTE — TELEPHONE ENCOUNTER
Called [facility]- spoke with Enrique Sevilla- I advised him I was calling to provide a verbal Rx for Botox 200 units  He was able to take a verbal Rx from me- Botox 200 units QTY: 1 under [facility] for Chronic Migraine with 3 refills  I provided him with the patient's [place] ID #  He was able to process the claim- he received a rejection stating non-matched card aceves ID  He is going to call the insurance to get assistance with this  I provided him with our shipping address  I provided him with our direct phone number  Will do a status check on this order tomorrow to confirm the delivery date

## 2021-05-18 NOTE — TELEPHONE ENCOUNTER
Called [facility]- spoke with Heavenly Guadalupe- he advised me that the patient's Botox order is ready and I scheduled for delivery  Botox to be delivered on Wednesday 5/19/2021 to the [facility] location via [facility] overnight- a signature will be required  Jennifer,    Please await the patient's Botox order and document once it has arrived  Please let me know if you do not receive the patient's Botox order      Thank you    [facility]

## 2021-05-19 NOTE — TELEPHONE ENCOUNTER
Botox number of units: 200  Botox quantity: 1  Arrived at what location: [facility]  Lot number: [id number]  Expiration Date: 02/2024  Appt notes indicate correct medication: yes    Botox arrival documented and store in the [address]  Receipt emailed to Botox coordinators and scanned into pt's chart

## 2021-06-24 NOTE — TELEPHONE ENCOUNTER
Patient called in to r/s botox  I spoke w/Jennifer via teams, she can make some changes and do 7/2  Parris Caba requested a staff message:      Patient needs to reschedule 6/28/21 botox  She can do 7/2/21 and is very flexible  Please callback at: [phone] Thank you!

## 2021-06-25 ENCOUNTER — TELEPHONE (OUTPATIENT)
Dept: NEUROLOGY | Facility: CLINIC | Age: 26
End: 2021-06-25

## 2021-06-25 NOTE — TELEPHONE ENCOUNTER
----- Message from Anil Kolb sent at 6/24/2021  2:58 PM EDT -----  Regarding: R/S Botox  Patient needs to reschedule 6/28/21 botox  She can do 7/2/21 and is very flexible  Please callback at: [phone] Thank you!         Roberto Doan

## 2021-07-06 ENCOUNTER — PROCEDURE VISIT (OUTPATIENT)
Dept: NEUROLOGY | Facility: CLINIC | Age: 26
End: 2021-07-06
Payer: COMMERCIAL

## 2021-07-06 VITALS — TEMPERATURE: 98.8 F | DIASTOLIC BLOOD PRESSURE: 72 MMHG | HEART RATE: 70 BPM | SYSTOLIC BLOOD PRESSURE: 123 MMHG

## 2021-07-06 DIAGNOSIS — G43.709 CHRONIC MIGRAINE WITHOUT AURA WITHOUT STATUS MIGRAINOSUS, NOT INTRACTABLE: Primary | ICD-10-CM

## 2021-07-06 PROCEDURE — 64615 CHEMODENERV MUSC MIGRAINE: CPT | Performed by: PHYSICIAN ASSISTANT

## 2021-07-06 NOTE — PROGRESS NOTES
Universal Protocol   Consent: Verbal consent obtained  Written consent obtained  Risks and benefits: risks, benefits and alternatives were discussed  Consent given by: patient  Time out: Immediately prior to procedure a "time out" was called to verify the correct patient, procedure, equipment, support staff and site/side marked as required  Patient understanding: patient states understanding of the procedure being performed  Patient consent: the patient's understanding of the procedure matches consent given  Procedure consent: procedure consent matches procedure scheduled  Relevant documents: relevant documents present and verified  Patient identity confirmed: verbally with patient        Chemodenervation     Date/Time 7/6/2021 3:11 PM     Performed by  Roxanne Solorzano PA-C     Authorized by Roxanne Solorzano PA-C        Pre-procedure details      Prepped With: Alcohol     Anesthesia  (see MAR for exact dosages):      Anesthesia method:  None   Procedure details     Position:  Upright   Botox     Botox Type:  Type A    Brand:  Botox    mL's of Botulinum Toxin:  200    Final Concentration per CC:  50 units    Needle Gauge:  30 G 2 5 inch   Procedures     Botox Procedures: chronic headache      Indications: migraines     Injection Location      Head / Face:  L superior cervical paraspinal, R superior cervical paraspinal, L , R , L frontalis, R frontalis, L medial occipitalis, R medial occipitalis, procerus, R temporalis, L temporalis, R superior trapezius and L superior trapezius    L  injection amount:  5 unit(s)    R  injection amount:  5 unit(s)    L lateral frontalis:  5 unit(s)    R lateral frontalis:  5 unit(s)    L medial frontalis:  5 unit(s)    R medial frontalis:  5 unit(s)    L temporalis injection amount:  20 unit(s)    R temporalis injection amount:  20 unit(s)    Procerus injection amount:  5 unit(s)    L medial occipitalis injection amount:  15 unit(s)    R medial occipitalis injection amount:  15 unit(s)    L superior cervical paraspinal injection amount:  10 unit(s)    R superior cervical paraspinal injection amount:  10 unit(s)    L superior trapezius injection amount:  15 unit(s)    R superior trapezius injection amount:  15 unit(s)   Total Units     Total units used:  200    Total units discarded:  0   Post-procedure details      Chemodenervation:  Chronic migraine    Facial Nerve Location[de-identified]  Bilateral facial nerve    Patient tolerance of procedure:   Tolerated well, no immediate complications   Comments      5 units orbicularis oculi bilaterally   25 units trapezius   10 units frontalis   All medically necessary

## 2021-10-01 ENCOUNTER — TELEPHONE (OUTPATIENT)
Dept: NEUROLOGY | Facility: CLINIC | Age: 26
End: 2021-10-01

## 2021-10-09 ENCOUNTER — EMERGENCY (EMERGENCY)
Facility: HOSPITAL | Age: 26
LOS: 0 days | Discharge: HOME | End: 2021-10-09
Attending: EMERGENCY MEDICINE | Admitting: EMERGENCY MEDICINE
Payer: MEDICAID

## 2021-10-09 VITALS
RESPIRATION RATE: 18 BRPM | OXYGEN SATURATION: 98 % | SYSTOLIC BLOOD PRESSURE: 106 MMHG | HEART RATE: 88 BPM | DIASTOLIC BLOOD PRESSURE: 79 MMHG

## 2021-10-09 VITALS
RESPIRATION RATE: 17 BRPM | TEMPERATURE: 99 F | WEIGHT: 153 LBS | HEART RATE: 91 BPM | DIASTOLIC BLOOD PRESSURE: 87 MMHG | OXYGEN SATURATION: 98 % | SYSTOLIC BLOOD PRESSURE: 119 MMHG

## 2021-10-09 DIAGNOSIS — Z87.39 PERSONAL HISTORY OF OTHER DISEASES OF THE MUSCULOSKELETAL SYSTEM AND CONNECTIVE TISSUE: ICD-10-CM

## 2021-10-09 DIAGNOSIS — R43.9 UNSPECIFIED DISTURBANCES OF SMELL AND TASTE: ICD-10-CM

## 2021-10-09 DIAGNOSIS — J12.3 HUMAN METAPNEUMOVIRUS PNEUMONIA: ICD-10-CM

## 2021-10-09 DIAGNOSIS — J02.9 ACUTE PHARYNGITIS, UNSPECIFIED: ICD-10-CM

## 2021-10-09 DIAGNOSIS — R50.9 FEVER, UNSPECIFIED: ICD-10-CM

## 2021-10-09 DIAGNOSIS — R51.9 HEADACHE, UNSPECIFIED: ICD-10-CM

## 2021-10-09 DIAGNOSIS — Z20.822 CONTACT WITH AND (SUSPECTED) EXPOSURE TO COVID-19: ICD-10-CM

## 2021-10-09 DIAGNOSIS — J45.909 UNSPECIFIED ASTHMA, UNCOMPLICATED: ICD-10-CM

## 2021-10-09 LAB
ALBUMIN SERPL ELPH-MCNC: 4.6 G/DL — SIGNIFICANT CHANGE UP (ref 3.5–5.2)
ALP SERPL-CCNC: 54 U/L — SIGNIFICANT CHANGE UP (ref 30–115)
ALT FLD-CCNC: 16 U/L — SIGNIFICANT CHANGE UP (ref 0–41)
ANION GAP SERPL CALC-SCNC: 12 MMOL/L — SIGNIFICANT CHANGE UP (ref 7–14)
AST SERPL-CCNC: 18 U/L — SIGNIFICANT CHANGE UP (ref 0–41)
BASOPHILS # BLD AUTO: 0.02 K/UL — SIGNIFICANT CHANGE UP (ref 0–0.2)
BASOPHILS NFR BLD AUTO: 0.4 % — SIGNIFICANT CHANGE UP (ref 0–1)
BILIRUB SERPL-MCNC: 0.6 MG/DL — SIGNIFICANT CHANGE UP (ref 0.2–1.2)
BUN SERPL-MCNC: 7 MG/DL — LOW (ref 10–20)
CALCIUM SERPL-MCNC: 9.4 MG/DL — SIGNIFICANT CHANGE UP (ref 8.5–10.1)
CHLORIDE SERPL-SCNC: 104 MMOL/L — SIGNIFICANT CHANGE UP (ref 98–110)
CO2 SERPL-SCNC: 25 MMOL/L — SIGNIFICANT CHANGE UP (ref 17–32)
CREAT SERPL-MCNC: 0.9 MG/DL — SIGNIFICANT CHANGE UP (ref 0.7–1.5)
EOSINOPHIL # BLD AUTO: 0.17 K/UL — SIGNIFICANT CHANGE UP (ref 0–0.7)
EOSINOPHIL NFR BLD AUTO: 3.1 % — SIGNIFICANT CHANGE UP (ref 0–8)
GLUCOSE SERPL-MCNC: 97 MG/DL — SIGNIFICANT CHANGE UP (ref 70–99)
HCT VFR BLD CALC: 44.2 % — SIGNIFICANT CHANGE UP (ref 37–47)
HGB BLD-MCNC: 15.1 G/DL — SIGNIFICANT CHANGE UP (ref 12–16)
HMPV RNA SPEC QL NAA+PROBE: DETECTED
IMM GRANULOCYTES NFR BLD AUTO: 0 % — LOW (ref 0.1–0.3)
LYMPHOCYTES # BLD AUTO: 1.54 K/UL — SIGNIFICANT CHANGE UP (ref 1.2–3.4)
LYMPHOCYTES # BLD AUTO: 28 % — SIGNIFICANT CHANGE UP (ref 20.5–51.1)
MCHC RBC-ENTMCNC: 31.4 PG — HIGH (ref 27–31)
MCHC RBC-ENTMCNC: 34.2 G/DL — SIGNIFICANT CHANGE UP (ref 32–37)
MCV RBC AUTO: 91.9 FL — SIGNIFICANT CHANGE UP (ref 81–99)
MONOCYTES # BLD AUTO: 0.7 K/UL — HIGH (ref 0.1–0.6)
MONOCYTES NFR BLD AUTO: 12.7 % — HIGH (ref 1.7–9.3)
NEUTROPHILS # BLD AUTO: 3.07 K/UL — SIGNIFICANT CHANGE UP (ref 1.4–6.5)
NEUTROPHILS NFR BLD AUTO: 55.8 % — SIGNIFICANT CHANGE UP (ref 42.2–75.2)
NRBC # BLD: 0 /100 WBCS — SIGNIFICANT CHANGE UP (ref 0–0)
PLATELET # BLD AUTO: 132 K/UL — SIGNIFICANT CHANGE UP (ref 130–400)
POTASSIUM SERPL-MCNC: 3.9 MMOL/L — SIGNIFICANT CHANGE UP (ref 3.5–5)
POTASSIUM SERPL-SCNC: 3.9 MMOL/L — SIGNIFICANT CHANGE UP (ref 3.5–5)
PROT SERPL-MCNC: 6.7 G/DL — SIGNIFICANT CHANGE UP (ref 6–8)
RAPID RVP RESULT: DETECTED
RBC # BLD: 4.81 M/UL — SIGNIFICANT CHANGE UP (ref 4.2–5.4)
RBC # FLD: 11.9 % — SIGNIFICANT CHANGE UP (ref 11.5–14.5)
SARS-COV-2 RNA SPEC QL NAA+PROBE: SIGNIFICANT CHANGE UP
SODIUM SERPL-SCNC: 141 MMOL/L — SIGNIFICANT CHANGE UP (ref 135–146)
WBC # BLD: 5.5 K/UL — SIGNIFICANT CHANGE UP (ref 4.8–10.8)
WBC # FLD AUTO: 5.5 K/UL — SIGNIFICANT CHANGE UP (ref 4.8–10.8)

## 2021-10-09 PROCEDURE — 93010 ELECTROCARDIOGRAM REPORT: CPT

## 2021-10-09 PROCEDURE — 71046 X-RAY EXAM CHEST 2 VIEWS: CPT | Mod: 26

## 2021-10-09 PROCEDURE — 99284 EMERGENCY DEPT VISIT MOD MDM: CPT

## 2021-10-09 RX ORDER — DEXAMETHASONE 0.5 MG/5ML
6 ELIXIR ORAL ONCE
Refills: 0 | Status: COMPLETED | OUTPATIENT
Start: 2021-10-09 | End: 2021-10-09

## 2021-10-09 RX ORDER — CLONAZEPAM 1 MG
0 TABLET ORAL
Qty: 0 | Refills: 0 | DISCHARGE

## 2021-10-09 RX ORDER — TIZANIDINE 4 MG/1
0 TABLET ORAL
Qty: 0 | Refills: 0 | DISCHARGE

## 2021-10-09 RX ORDER — KETOROLAC TROMETHAMINE 30 MG/ML
30 SYRINGE (ML) INJECTION ONCE
Refills: 0 | Status: DISCONTINUED | OUTPATIENT
Start: 2021-10-09 | End: 2021-10-09

## 2021-10-09 RX ORDER — SODIUM CHLORIDE 9 MG/ML
1000 INJECTION INTRAMUSCULAR; INTRAVENOUS; SUBCUTANEOUS ONCE
Refills: 0 | Status: COMPLETED | OUTPATIENT
Start: 2021-10-09 | End: 2021-10-09

## 2021-10-09 RX ORDER — CERTOLIZUMAB PEGOL 400 MG
0 KIT SUBCUTANEOUS
Qty: 0 | Refills: 0 | DISCHARGE

## 2021-10-09 RX ORDER — ONABOTULINUMTOXINA 100 UNIT
0 VIAL (EA) INJECTION
Qty: 0 | Refills: 0 | DISCHARGE

## 2021-10-09 RX ADMIN — Medication 6 MILLIGRAM(S): at 15:24

## 2021-10-09 RX ADMIN — Medication 30 MILLIGRAM(S): at 15:24

## 2021-10-09 RX ADMIN — SODIUM CHLORIDE 1000 MILLILITER(S): 9 INJECTION INTRAMUSCULAR; INTRAVENOUS; SUBCUTANEOUS at 15:25

## 2021-10-09 NOTE — ED PROVIDER NOTE - NS ED ROS FT
Review of Systems    Constitutional: (+) fever/ chills (-)loss of appetite   Eyes (-) visual changes  ENT: (-) epistaxis (+) sore throat (+)loss of smell  Cardiovascular: (-) chest pain, (-) syncope (-) palpitations  Respiratory: (+) cough, (-) shortness of breath  Gastrointestinal: (-) vomiting, (-) diarrhea (-) abdominal pain  : (-) dysuria , hematuria   neck: (-) neck pain or stiffness  Musculoskeletal:  (-) back pain, (-) joint pain   Integumentary: (-) rash, (-) swelling  Neurological: (-) headache, (-) altered mental status

## 2021-10-09 NOTE — ED ADULT NURSE NOTE - NSIMPLEMENTINTERV_GEN_ALL_ED
Implemented All Universal Safety Interventions:  [place] to call system. Call bell, personal items and telephone within reach. Instruct patient to call for assistance. Room bathroom lighting operational. Non-slip footwear when patient is off stretcher. Physically safe environment: no spills, clutter or unnecessary equipment. Stretcher in lowest position, wheels locked, appropriate side rails in place.

## 2021-10-09 NOTE — ED PROVIDER NOTE - ATTENDING CONTRIBUTION TO CARE
25 yo F PMHx psoriatic arthritis, asthma presents with feeling unwell x 5 days.  + headache, sore throat, fevers, loss of taste and smell, Pt had positive COVID exposure at school. Received her vaccine, no n/v/d.  On exam pt in NAD AAO x 3, speaking full clear sentences, non toxic, Op with erythema, no exudates, no vesicles, neck supple, no lad, TM clear and intact b/l, Lungs cta b/l, occ cough on exam, abd soft nt nd, no edema, no rash

## 2021-10-09 NOTE — ED PROVIDER NOTE - CLINICAL SUMMARY MEDICAL DECISION MAKING FREE TEXT BOX
Results reviewed And d/w patient.  Pt feels improved after ED treatment.  Will cont supportive care at home. Strict return precautions discussed. Pt instructed to return if any worsening symptoms or concerns.  They verbalize understanding.

## 2021-10-09 NOTE — ED PROVIDER NOTE - OBJECTIVE STATEMENT
27 y/o female with hx of psoriatic arthritis, asthma , vaccinated with moderna in 3/21, presents to the Ed with 5 days of sore throat, headache, fevers, cough non productive and loss of smell. patient with possible exposure to covid in school. no vomiting or diarrhea. patinet with decreased po intake. no back pain. no dysuria or gross hematuria. no syncope. patient c/o weakness

## 2021-10-09 NOTE — ED PROVIDER NOTE - PHYSICAL EXAMINATION
Vital Signs: I have reviewed the initial vital signs.  Constitutional: well-nourished, no acute distress, normocephalic  Eyes: PERRLA, EOMI, clear conjunctiva  ENT: MMM, TM b/l clear , + nasal congestion  Cardiovascular: regular rate, regular rhythm, no murmur appreciated  Respiratory: unlabored respiratory effort, coarse but clear to auscultation bilaterally  Gastrointestinal: soft, non-tender, non-distended  abdomen, no pulsatile mass  Musculoskeletal: supple neck, no lower extremity edema, no bony tenderness  Integumentary: warm, dry, no rash  Neurologic: awake, alert, cranial nerves II-XII grossly intact, extremities’ motor and sensory functions grossly intact, no focal deficits

## 2021-10-09 NOTE — ED PROVIDER NOTE - PATIENT PORTAL LINK FT
You can access the FollowMyHealth Patient Portal offered by [facility] by registering at the following website: [email or web address]. By joining [employer]’s FollowMyHealth portal, you will also be able to view your health information using other applications (apps) compatible with our system.

## 2021-10-19 ENCOUNTER — PROCEDURE VISIT (OUTPATIENT)
Dept: NEUROLOGY | Facility: CLINIC | Age: 26
End: 2021-10-19
Payer: COMMERCIAL

## 2021-10-19 VITALS — SYSTOLIC BLOOD PRESSURE: 126 MMHG | HEART RATE: 79 BPM | DIASTOLIC BLOOD PRESSURE: 70 MMHG | TEMPERATURE: 98.5 F

## 2021-10-19 DIAGNOSIS — G43.009 MIGRAINE WITHOUT AURA AND WITHOUT STATUS MIGRAINOSUS, NOT INTRACTABLE: ICD-10-CM

## 2021-10-19 DIAGNOSIS — R51.9 CHRONIC DAILY HEADACHE: ICD-10-CM

## 2021-10-19 DIAGNOSIS — G43.709 CHRONIC MIGRAINE WITHOUT AURA WITHOUT STATUS MIGRAINOSUS, NOT INTRACTABLE: Primary | ICD-10-CM

## 2021-10-19 PROCEDURE — 64615 CHEMODENERV MUSC MIGRAINE: CPT | Performed by: PHYSICIAN ASSISTANT

## 2021-10-19 RX ORDER — RIZATRIPTAN BENZOATE 10 MG/1
10 TABLET ORAL ONCE AS NEEDED
Qty: 9 TABLET | Refills: 3 | Status: SHIPPED | OUTPATIENT
Start: 2021-10-19

## 2021-10-19 RX ORDER — PROCHLORPERAZINE MALEATE 10 MG
10 TABLET ORAL EVERY 6 HOURS PRN
Qty: 12 TABLET | Refills: 3 | Status: SHIPPED | OUTPATIENT
Start: 2021-10-19

## 2021-10-27 ENCOUNTER — OFFICE VISIT (OUTPATIENT)
Dept: CARDIOLOGY CLINIC | Facility: CLINIC | Age: 26
End: 2021-10-27
Payer: COMMERCIAL

## 2021-10-27 VITALS
WEIGHT: 153 LBS | SYSTOLIC BLOOD PRESSURE: 104 MMHG | HEART RATE: 78 BPM | HEIGHT: 66 IN | DIASTOLIC BLOOD PRESSURE: 72 MMHG | BODY MASS INDEX: 24.59 KG/M2

## 2021-10-27 DIAGNOSIS — R00.2 PALPITATIONS: Primary | ICD-10-CM

## 2021-10-27 PROCEDURE — 99214 OFFICE O/P EST MOD 30 MIN: CPT | Performed by: PHYSICIAN ASSISTANT

## 2021-10-27 PROCEDURE — 93000 ELECTROCARDIOGRAM COMPLETE: CPT | Performed by: PHYSICIAN ASSISTANT

## 2021-10-27 RX ORDER — ZOLPIDEM TARTRATE 10 MG/1
10 TABLET ORAL
COMMUNITY

## 2021-12-10 ENCOUNTER — TELEPHONE (OUTPATIENT)
Dept: NEUROLOGY | Facility: CLINIC | Age: 26
End: 2021-12-10

## 2022-01-05 ENCOUNTER — TELEPHONE (OUTPATIENT)
Dept: NEUROLOGY | Facility: CLINIC | Age: 27
End: 2022-01-05

## 2022-01-05 NOTE — TELEPHONE ENCOUNTER
Botox:  200 units is schedule for delivery on 01/05/2022  VIA: [employer] [facility]    Please inform of delivery        Call [facility], speak to Lissa, the pharmacist  I told her I was calling to see why we did not rec'd Ms Florencia Hernadez's delivery; she advised me the patient needed a new Rx for delivery  Verbal RX order was given to Lissa over the phone  I asked her to julián it as STAT  I will check back with 24hrs to get a status  DISPLAY PLAN FREE TEXT

## 2022-01-11 NOTE — TELEPHONE ENCOUNTER
Botox number of units: 200 units  Botox quantity: 1  Arrived at what location: [place]  Botox at Correct Administering Location: Yes  [address] number: [id number]  Lot number: [id number]  Expiration Date: 09/01/2024  Appt notes indicate correct medication: Yes

## 2022-01-25 ENCOUNTER — PROCEDURE VISIT (OUTPATIENT)
Dept: NEUROLOGY | Facility: CLINIC | Age: 27
End: 2022-01-25
Payer: MEDICARE

## 2022-01-25 VITALS — DIASTOLIC BLOOD PRESSURE: 73 MMHG | HEART RATE: 76 BPM | SYSTOLIC BLOOD PRESSURE: 118 MMHG | TEMPERATURE: 97.4 F

## 2022-01-25 DIAGNOSIS — G44.82 HEADACHE ASSOCIATED WITH SEXUAL ACTIVITY: ICD-10-CM

## 2022-01-25 DIAGNOSIS — G43.709 CHRONIC MIGRAINE WITHOUT AURA WITHOUT STATUS MIGRAINOSUS, NOT INTRACTABLE: Primary | ICD-10-CM

## 2022-01-25 PROCEDURE — 99214 OFFICE O/P EST MOD 30 MIN: CPT | Performed by: PHYSICIAN ASSISTANT

## 2022-01-25 PROCEDURE — 64615 CHEMODENERV MUSC MIGRAINE: CPT | Performed by: PHYSICIAN ASSISTANT

## 2022-01-25 RX ORDER — INDOMETHACIN 25 MG/1
25 CAPSULE ORAL
Qty: 20 CAPSULE | Refills: 3 | Status: SHIPPED | OUTPATIENT
Start: 2022-01-25

## 2022-01-25 NOTE — PROGRESS NOTES
Universal Protocol   Consent: Verbal consent obtained  Written consent obtained  Risks and benefits: risks, benefits and alternatives were discussed  Consent given by: patient  Time out: Immediately prior to procedure a "time out" was called to verify the correct patient, procedure, equipment, support staff and site/side marked as required  Patient understanding: patient states understanding of the procedure being performed  Patient consent: the patient's understanding of the procedure matches consent given  Procedure consent: procedure consent matches procedure scheduled  Relevant documents: relevant documents present and verified  Patient identity confirmed: verbally with patient        Chemodenervation     Date/Time 1/25/2022 3:07 PM     Performed by  Kelechi Garduno PA-C     Authorized by Kelechi Garduno PA-C        Pre-procedure details      Prepped With: Alcohol     Anesthesia  (see MAR for exact dosages):      Anesthesia method:  None   Procedure details     Position:  Upright   Botox     Botox Type:  Type A    Brand:  Botox    mL's of Botulinum Toxin:  200    Final Concentration per CC:  50 units    Needle Gauge:  30 G 2 5 inch   Procedures     Botox Procedures: chronic headache      Indications: migraines     Injection Location      Head / Face:  L superior cervical paraspinal, R superior cervical paraspinal, L , R , L frontalis, R frontalis, L medial occipitalis, R medial occipitalis, procerus, R temporalis, L temporalis, R superior trapezius and L superior trapezius    L  injection amount:  5 unit(s)    R  injection amount:  5 unit(s)    L lateral frontalis:  5 unit(s)    R lateral frontalis:  5 unit(s)    L medial frontalis:  5 unit(s)    R medial frontalis:  5 unit(s)    L temporalis injection amount:  20 unit(s)    R temporalis injection amount:  20 unit(s)    Procerus injection amount:  5 unit(s)    L medial occipitalis injection amount:  15 unit(s)    R medial occipitalis injection amount:  15 unit(s)    L superior cervical paraspinal injection amount:  10 unit(s)    R superior cervical paraspinal injection amount:  10 unit(s)    L superior trapezius injection amount:  15 unit(s)    R superior trapezius injection amount:  15 unit(s)   Total Units     Total units used:  200    Total units discarded:  0   Post-procedure details      Chemodenervation:  Chronic migraine    Facial Nerve Location[de-identified]  Bilateral facial nerve    Patient tolerance of procedure: Tolerated well, no immediate complications   Comments       5 units orbicularis oculi bilaterally   25 units trapezius   10 units frontalis   All medically necessary       Patient states that she is having 10/10 sexual headaches  Happens every time  Patient states that it after orgasm  Rushes of blood to her brain is what she feels like  Patient states that her vision goes bad and has to sit back down  Feels like she would collapse if she got up  Has fallen a few times  Has been ongoing for approximately a year  Has not improved  Has tried rizatriptan but is not helpful to alleviate the pain    9/15/2020 CTA head     CT brain: No acute intracranial abnormality      CT angiography: Normal cervical and intracranial vasculature  1/21/2020 MRI brain  A 4 mm focus of gradient susceptibility signal abnormality in the subcortical right frontal lobe with associated mild adjacent FLAIR signal hyperintensity without enhancement  This is nonspecific, and 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

## 2022-01-25 NOTE — ASSESSMENT & PLAN NOTE
Indomethacin 25 mg every 8 hours as needed at least 1 hour prior to intercourse    May use round the clock for weekends    Need to ensure there isn't any new or worsening pathology causing her sudden onset of 10/10 pain with each sexual encounter therefore, will proceed with MRA and MRA

## 2022-02-23 ENCOUNTER — HOSPITAL ENCOUNTER (OUTPATIENT)
Dept: RADIOLOGY | Facility: HOSPITAL | Age: 27
Discharge: HOME | End: 2022-02-23
Payer: MEDICARE

## 2022-02-23 DIAGNOSIS — G44.82 HEADACHE ASSOCIATED WITH SEXUAL ACTIVITY: ICD-10-CM

## 2022-02-23 PROCEDURE — 70544 MR ANGIOGRAPHY HEAD W/O DYE: CPT

## 2022-02-23 PROCEDURE — G1004 CDSM NDSC: HCPCS

## 2022-03-11 NOTE — TELEPHONE ENCOUNTER
Botox:  200 units is schedule for delivery 03/29/2022    Via- [employer] [facility]      Please inform of delivery

## 2022-03-29 NOTE — TELEPHONE ENCOUNTER
Botox number of units: 200 units  Botox quantity: 1  Arrived at [facility]  Botox at Correct Administering Location: Yes  [address] number: [id number]  Lot number: [id number]  Expiration Date: 10/01/2024  Appt notes indicate correct medication: Yes

## 2022-04-29 ENCOUNTER — PROCEDURE VISIT (OUTPATIENT)
Dept: NEUROLOGY | Facility: CLINIC | Age: 27
End: 2022-04-29
Payer: MEDICARE

## 2022-04-29 VITALS — SYSTOLIC BLOOD PRESSURE: 118 MMHG | TEMPERATURE: 98 F | DIASTOLIC BLOOD PRESSURE: 65 MMHG | HEART RATE: 85 BPM

## 2022-04-29 DIAGNOSIS — G43.709 CHRONIC MIGRAINE WITHOUT AURA WITHOUT STATUS MIGRAINOSUS, NOT INTRACTABLE: Primary | ICD-10-CM

## 2022-04-29 PROCEDURE — 64615 CHEMODENERV MUSC MIGRAINE: CPT | Performed by: PHYSICIAN ASSISTANT

## 2022-04-29 NOTE — PROGRESS NOTES
Universal Protocol   Consent: Verbal consent obtained  Written consent obtained  Risks and benefits: risks, benefits and alternatives were discussed  Consent given by: patient  Time out: Immediately prior to procedure a "time out" was called to verify the correct patient, procedure, equipment, support staff and site/side marked as required  Patient understanding: patient states understanding of the procedure being performed  Patient consent: the patient's understanding of the procedure matches consent given  Procedure consent: procedure consent matches procedure scheduled  Relevant documents: relevant documents present and verified  Patient identity confirmed: verbally with patient        Chemodenervation     Date/Time 4/29/2022 2:32 PM     Performed by  Cristian Tay PA-C     Authorized by Cristian Tay PA-C        Pre-procedure details      Prepped With: Alcohol     Anesthesia  (see MAR for exact dosages):      Anesthesia method:  None   Procedure details     Position:  Upright   Botox     Botox Type:  Type A    Brand:  Botox    mL's of Botulinum Toxin:  200    Final Concentration per CC:  50 units    Needle Gauge:  30 G 2 5 inch   Procedures     Botox Procedures: chronic headache      Indications: migraines     Injection Location      Head / Face:  L superior cervical paraspinal, R superior cervical paraspinal, L , R , L frontalis, R frontalis, L medial occipitalis, R medial occipitalis, procerus, R temporalis, L temporalis, R superior trapezius and L superior trapezius    L  injection amount:  5 unit(s)    R  injection amount:  5 unit(s)    L lateral frontalis:  5 unit(s)    R lateral frontalis:  5 unit(s)    L medial frontalis:  5 unit(s)    R medial frontalis:  5 unit(s)    L temporalis injection amount:  20 unit(s)    R temporalis injection amount:  20 unit(s)    Procerus injection amount:  5 unit(s)    L medial occipitalis injection amount:  15 unit(s)    R medial occipitalis injection amount:  15 unit(s)    L superior cervical paraspinal injection amount:  10 unit(s)    R superior cervical paraspinal injection amount:  10 unit(s)    L superior trapezius injection amount:  15 unit(s)    R superior trapezius injection amount:  15 unit(s)   Total Units     Total units used:  200    Total units discarded:  0   Post-procedure details      Chemodenervation:  Chronic migraine    Facial Nerve Location[de-identified]  Bilateral facial nerve    Patient tolerance of procedure:   Tolerated well, no immediate complications   Comments      5 units orbicularis oculi bilaterally   25 units trapezius   10 units frontalis   All medically necessary

## 2022-07-14 ENCOUNTER — TELEPHONE (OUTPATIENT)
Dept: NEUROLOGY | Facility: CLINIC | Age: 27
End: 2022-07-14

## 2022-07-14 NOTE — TELEPHONE ENCOUNTER
Submitted prior-authorization via fax to [employer] for:   Botox-200 units, , [id number]       Awaiting approval/denial response      Will follow up on the status of pending authorization requested

## 2022-07-19 ENCOUNTER — TELEPHONE (OUTPATIENT)
Dept: NEUROLOGY | Facility: CLINIC | Age: 27
End: 2022-07-19

## 2022-07-19 NOTE — TELEPHONE ENCOUNTER
Received call from [employer] and gave verbal RX to [place] (pharmacist) for:     Botox-200 units  Qty:1  Courtney Ryan did expedite this order to be processed STAT per my request     Will follow up on the status of this order

## 2022-07-19 NOTE — TELEPHONE ENCOUNTER
Called [employer] and spoke to [facility] who informed me that the [facility] Request for Botox,  has been Approved  Received the following authorization approval info via fax from from [facility] w/ [employer]:    Approved   Botox-200 units,    ID# [id number]  Valid- 7/15/2022 until 7/15/2023  4 visits    Please use [facility]

## 2022-07-20 NOTE — TELEPHONE ENCOUNTER
Called [employer] and spoke to Robert who informed me that patients Botox medication was picked up by [employer] on 7/19/2022 to be delivered to our [facility] office on 7/20/2022  Robert provided me with   [employer] Tracking# [id number]    Tracking info states delivery is scheduled to [facility] for end of day on 7/20/2022  [employer] delivery of Botox medication still has not arrived as of 5:00pm 7/20/2022

## 2022-07-21 NOTE — TELEPHONE ENCOUNTER
Received patients Botox via [employer] Today 7/21/2022 and Botox medication was no longer cold so it is NO GOOD  Called [facility] and spoke to Goldonna making her aware that patients Botox was delivered a day late and when we received it the medication was no longer cold so therefore we cannot use it  Goldonna informed me that she will reach out to patients insurance company in regards to this and call me back to set up and schedule delivery for new Botox medication to be shipped to our office prior to the patients appointment on 7/29/2022  Will follow up with [facility] [facility] on the status of this

## 2022-07-22 NOTE — TELEPHONE ENCOUNTER
Call received from Real S Tomer Rucker at [address] to arrange the delivery of:    Botox: 200 units   Qty: 1   Delivery to [facility]  Scheduled: 7/26/2022    Please advise if medication does not arrive

## 2022-07-26 NOTE — TELEPHONE ENCOUNTER
Botox number of units: 200  Botox quantity: 1  Arrived at what location: [place]  Botox at Correct Administering Location: yes  [facility] number: [id number]  Lot number: [id number]  Expiration Date: 12/31/2024  Appt notes indicate correct medication: yes

## 2022-07-29 ENCOUNTER — PROCEDURE VISIT (OUTPATIENT)
Dept: NEUROLOGY | Facility: CLINIC | Age: 27
End: 2022-07-29
Payer: MEDICARE

## 2022-07-29 VITALS — HEART RATE: 89 BPM | SYSTOLIC BLOOD PRESSURE: 121 MMHG | TEMPERATURE: 98.3 F | DIASTOLIC BLOOD PRESSURE: 66 MMHG

## 2022-07-29 DIAGNOSIS — G43.709 CHRONIC MIGRAINE WITHOUT AURA WITHOUT STATUS MIGRAINOSUS, NOT INTRACTABLE: Primary | ICD-10-CM

## 2022-07-29 PROCEDURE — 64615 CHEMODENERV MUSC MIGRAINE: CPT | Performed by: PHYSICIAN ASSISTANT

## 2022-07-29 NOTE — PROGRESS NOTES
Universal Protocol   Consent: Verbal consent obtained  Written consent obtained  Risks and benefits: risks, benefits and alternatives were discussed  Consent given by: patient  Time out: Immediately prior to procedure a "time out" was called to verify the correct patient, procedure, equipment, support staff and site/side marked as required  Patient understanding: patient states understanding of the procedure being performed  Patient consent: the patient's understanding of the procedure matches consent given  Procedure consent: procedure consent matches procedure scheduled  Relevant documents: relevant documents present and verified  Patient identity confirmed: verbally with patient        Chemodenervation     Date/Time 7/29/2022 2:24 PM     Performed by  Josefina Mcneal PA-C     Authorized by Josefina Mcneal PA-C        Pre-procedure details      Prepped With: Alcohol     Anesthesia  (see MAR for exact dosages):      Anesthesia method:  None   Procedure details     Position:  Upright   Botox     Botox Type:  Type A    Brand:  Botox    mL's of Botulinum Toxin:  200    Final Concentration per CC:  50 units    Needle Gauge:  30 G 2 5 inch   Procedures     Botox Procedures: chronic headache      Indications: migraines     Injection Location      Head / Face:  L superior cervical paraspinal, R superior cervical paraspinal, L , R , L frontalis, R frontalis, L medial occipitalis, R medial occipitalis, procerus, R temporalis, L temporalis, R superior trapezius and L superior trapezius    L  injection amount:  5 unit(s)    R  injection amount:  5 unit(s)    L lateral frontalis:  5 unit(s)    R lateral frontalis:  5 unit(s)    L medial frontalis:  5 unit(s)    R medial frontalis:  5 unit(s)    L temporalis injection amount:  20 unit(s)    R temporalis injection amount:  20 unit(s)    Procerus injection amount:  5 unit(s)    L medial occipitalis injection amount:  15 unit(s)    R medial occipitalis injection amount:  15 unit(s)    L superior cervical paraspinal injection amount:  10 unit(s)    R superior cervical paraspinal injection amount:  10 unit(s)    L superior trapezius injection amount:  15 unit(s)    R superior trapezius injection amount:  15 unit(s)   Total Units     Total units used:  200    Total units discarded:  0   Post-procedure details      Chemodenervation:  Chronic migraine    Facial Nerve Location[de-identified]  Bilateral facial nerve    Patient tolerance of procedure:   Tolerated well, no immediate complications   Comments      5 units orbicularis oculi bilaterally   25 units trapezius   10 units frontalis   All medically necessary

## 2022-10-05 ENCOUNTER — TELEPHONE (OUTPATIENT)
Dept: NEUROLOGY | Facility: CLINIC | Age: 27
End: 2022-10-05

## 2022-10-05 NOTE — TELEPHONE ENCOUNTER
[facility] and set up delivery for:    Botox: 200 units  Qty:1  [facility]   Scheduled: 10/11/2022      Please advise if medication does not arrive

## 2022-10-25 ENCOUNTER — TELEPHONE (OUTPATIENT)
Dept: OTHER | Facility: OTHER | Age: 27
End: 2022-10-25

## 2022-10-28 ENCOUNTER — TELEPHONE (OUTPATIENT)
Dept: NEUROLOGY | Facility: CLINIC | Age: 27
End: 2022-10-28

## 2022-10-28 NOTE — TELEPHONE ENCOUNTER
Needed to cancel her botox today at 9:45 with Mary Felty due to a family emergency  Please call her back to r/s

## 2022-11-14 ENCOUNTER — PROCEDURE VISIT (OUTPATIENT)
Dept: NEUROLOGY | Facility: CLINIC | Age: 27
End: 2022-11-14

## 2022-11-14 VITALS — TEMPERATURE: 97.9 F | DIASTOLIC BLOOD PRESSURE: 79 MMHG | HEART RATE: 80 BPM | SYSTOLIC BLOOD PRESSURE: 110 MMHG

## 2022-11-14 DIAGNOSIS — G43.709 CHRONIC MIGRAINE WITHOUT AURA WITHOUT STATUS MIGRAINOSUS, NOT INTRACTABLE: Primary | ICD-10-CM

## 2022-11-14 NOTE — PROGRESS NOTES
Universal Protocol   Consent: Verbal consent obtained  Written consent obtained  Risks and benefits: risks, benefits and alternatives were discussed  Consent given by: patient  Time out: Immediately prior to procedure a "time out" was called to verify the correct patient, procedure, equipment, support staff and site/side marked as required  Patient understanding: patient states understanding of the procedure being performed  Patient consent: the patient's understanding of the procedure matches consent given  Procedure consent: procedure consent matches procedure scheduled  Relevant documents: relevant documents present and verified  Patient identity confirmed: verbally with patient        Chemodenervation     Date/Time 11/14/2022 8:13 AM     Performed by  Vel Turpin PA-C     Authorized by Vel Turpin PA-C        Pre-procedure details      Prepped With: Alcohol     Anesthesia  (see MAR for exact dosages):      Anesthesia method:  None   Procedure details     Position:  Upright   Botox     Botox Type:  Type A    Brand:  Botox    mL's of Botulinum Toxin:  200    Final Concentration per CC:  50 units    Needle Gauge:  30 G 2 5 inch   Procedures     Botox Procedures: chronic headache      Indications: migraines     Injection Location      Head / Face:  L superior cervical paraspinal, R superior cervical paraspinal, L , R , L frontalis, R frontalis, L medial occipitalis, R medial occipitalis, procerus, R temporalis, L temporalis, R superior trapezius and L superior trapezius    L  injection amount:  5 unit(s)    R  injection amount:  5 unit(s)    L lateral frontalis:  5 unit(s)    R lateral frontalis:  5 unit(s)    L medial frontalis:  5 unit(s)    R medial frontalis:  5 unit(s)    L temporalis injection amount:  20 unit(s)    R temporalis injection amount:  20 unit(s)    Procerus injection amount:  5 unit(s)    L medial occipitalis injection amount:  15 unit(s)    R medial occipitalis injection amount:  15 unit(s)    L superior cervical paraspinal injection amount:  10 unit(s)    R superior cervical paraspinal injection amount:  10 unit(s)    L superior trapezius injection amount:  15 unit(s)    R superior trapezius injection amount:  15 unit(s)   Total Units     Total units used:  200    Total units discarded:  0   Post-procedure details      Chemodenervation:  Chronic migraine    Facial Nerve Location[de-identified]  Bilateral facial nerve    Patient tolerance of procedure:   Tolerated well, no immediate complications   Comments           5 units orbicularis oculi bilaterally   25 units trapezius   10 units frontalis   All medically necessary

## 2023-01-24 ENCOUNTER — NURSE TRIAGE (OUTPATIENT)
Dept: OTHER | Facility: OTHER | Age: 28
End: 2023-01-24

## 2023-01-24 DIAGNOSIS — G43.009 MIGRAINE WITHOUT AURA AND WITHOUT STATUS MIGRAINOSUS, NOT INTRACTABLE: Primary | ICD-10-CM

## 2023-01-24 NOTE — TELEPHONE ENCOUNTER
Regarding: migraine for 3 days/jittery feeling  ----- Message from Elvin Griffin sent at 1/24/2023  6:44 PM EST -----  Pt called "I have had a migraine for 3 days now and feel very jittery at my temples and base of neck on the left side "

## 2023-01-24 NOTE — TELEPHONE ENCOUNTER
Reason for Disposition  • [1] SEVERE headache (e g , excruciating) AND [2] not improved after 2 hours of pain medicine    Answer Assessment - Initial Assessment Questions  1  LOCATION: "Where does it hurt?"       Both temple and base of neck  2  ONSET: "When did the headache start?" (Minutes, hours or days)       3 days ago getting worse with each day  3  PATTERN: "Does the pain come and go, or has it been constant since it started?"      Constant  4  SEVERITY: "How bad is the pain?" and "What does it keep you from doing?"  (e g , Scale 1-10; mild, moderate, or severe)    - MILD (1-3): doesn't interfere with normal activities     - MODERATE (4-7): interferes with normal activities or awakens from sleep     - SEVERE (8-10): excruciating pain, unable to do any normal activities         # 8 -Severe  5  RECURRENT SYMPTOM: "Have you ever had headaches before?" If Yes, ask: "When was the last time?" and "What happened that time?"       Yes  6  CAUSE: "What do you think is causing the headache?"      Unsure-feels off  And she feels shaky  7  MIGRAINE: "Have you been diagnosed with migraine headaches?" If Yes, ask: "Is this headache similar?"       Yes - Maxalt not giving her relief  8  HEAD INJURY: "Has there been any recent injury to the head?"       Yes  9  OTHER SYMPTOMS: "Do you have any other symptoms?" (fever, stiff neck, eye pain, sore throat, cold symptoms)        Swelling left shoulder muscle blade    10  PREGNANCY: "Is there any chance you are pregnant?" "When was your last menstrual period?"        No    Protocols used: HEADACHE-ADULT-

## 2023-01-25 NOTE — TELEPHONE ENCOUNTER
Patient will get a ride to [facility] now

## 2023-01-25 NOTE — TELEPHONE ENCOUNTER
I don't see that patient was seen at [facility] or ER  Please call to check on patient today    Thanks

## 2023-01-27 RX ORDER — RIZATRIPTAN BENZOATE 10 MG/1
TABLET ORAL
Qty: 9 TABLET | Refills: 6 | Status: SHIPPED | OUTPATIENT
Start: 2023-01-27

## 2023-01-27 NOTE — TELEPHONE ENCOUNTER
Called and advised pt of the below  She verbalized clear understanding  Pt states that she is feeling a little better  She didn't get a chance to go to [facility] or ER bec of school and work  States that she started augmentin for tooth infection prior to the episode  This is the only new meds  She takes rizatriptan prn for her migraines  Requesting script be sent to [facility] pharmacy on file  Rx entered   Pls review and sign off    thanks

## 2023-02-07 ENCOUNTER — TELEPHONE (OUTPATIENT)
Dept: NEUROLOGY | Facility: CLINIC | Age: 28
End: 2023-02-07

## 2023-02-07 NOTE — TELEPHONE ENCOUNTER
[address] [place] [address] and scheduled delivery with Beena for:    Botox-200 units  Qty:1  Del[facility] to [facility]  Scheduled for 2/09/2023  Via: Fed[employer]    Please advise if medication doesn't arrive

## 2023-02-09 NOTE — TELEPHONE ENCOUNTER
Botox number of units: 200  Botox quantity: 1  Arrived at what location: [place]  Botox at Correct Administering Location: yes  NDC number: [id number]  Lot number: [id number]  Expiration Date: 04/30/2025  Appt notes indicate correct medication: yes

## 2023-02-13 ENCOUNTER — PROCEDURE VISIT (OUTPATIENT)
Dept: NEUROLOGY | Facility: CLINIC | Age: 28
End: 2023-02-13

## 2023-02-13 VITALS — SYSTOLIC BLOOD PRESSURE: 119 MMHG | TEMPERATURE: 98.5 F | DIASTOLIC BLOOD PRESSURE: 64 MMHG | HEART RATE: 89 BPM

## 2023-02-13 DIAGNOSIS — G43.709 CHRONIC MIGRAINE WITHOUT AURA WITHOUT STATUS MIGRAINOSUS, NOT INTRACTABLE: Primary | ICD-10-CM

## 2023-02-13 NOTE — PROGRESS NOTES
Universal Protocol   Consent: Verbal consent obtained  Written consent obtained  Risks and benefits: risks, benefits and alternatives were discussed  Consent given by: patient  Time out: Immediately prior to procedure a "time out" was called to verify the correct patient, procedure, equipment, support staff and site/side marked as required  Patient understanding: patient states understanding of the procedure being performed  Patient consent: the patient's understanding of the procedure matches consent given  Procedure consent: procedure consent matches procedure scheduled  Relevant documents: relevant documents present and verified  Patient identity confirmed: verbally with patient        Chemodenervation     Date/Time 2/13/2023 1:50 PM     Performed by  Lino Hernandez PA-C     Authorized by Lino Hernandez PA-C        Pre-procedure details      Prepped With: Alcohol     Anesthesia  (see MAR for exact dosages):      Anesthesia method:  None   Procedure details     Position:  Upright   Botox     Botox Type:  Type A    Brand:  Botox    mL's of Botulinum Toxin:  200    Final Concentration per CC:  50 units    Needle Gauge:  30 G 2 5 inch   Procedures     Botox Procedures: chronic headache      Indications: migraines     Injection Location      Head / Face:  L superior cervical paraspinal, R superior cervical paraspinal, L , R , L frontalis, R frontalis, L medial occipitalis, R medial occipitalis, procerus, R temporalis, L temporalis, R superior trapezius and L superior trapezius    L  injection amount:  5 unit(s)    R  injection amount:  5 unit(s)    L lateral frontalis:  5 unit(s)    R lateral frontalis:  5 unit(s)    L medial frontalis:  5 unit(s)    R medial frontalis:  5 unit(s)    L temporalis injection amount:  20 unit(s)    R temporalis injection amount:  20 unit(s)    Procerus injection amount:  5 unit(s)    L medial occipitalis injection amount:  15 unit(s)    R medial occipitalis injection amount:  15 unit(s)    L superior cervical paraspinal injection amount:  10 unit(s)    R superior cervical paraspinal injection amount:  10 unit(s)    L superior trapezius injection amount:  15 unit(s)    R superior trapezius injection amount:  15 unit(s)   Total Units     Total units used:  200    Total units discarded:  0   Post-procedure details      Chemodenervation:  Chronic migraine    Facial Nerve Location[de-identified]  Bilateral facial nerve    Patient tolerance of procedure:   Tolerated well, no immediate complications   Comments      5 units SCM bilaterally  5 units splenius bilaterally  5 units left scalene  20 units trapezius  All medically necessary

## 2023-03-01 ENCOUNTER — TELEPHONE (OUTPATIENT)
Dept: NEUROLOGY | Facility: CLINIC | Age: 28
End: 2023-03-01

## 2023-03-01 DIAGNOSIS — M54.2 CERVICALGIA: Primary | ICD-10-CM

## 2023-03-01 RX ORDER — BACLOFEN 10 MG/1
10 TABLET ORAL 3 TIMES DAILY PRN
Qty: 30 TABLET | Refills: 0 | Status: SHIPPED | OUTPATIENT
Start: 2023-03-01

## 2023-03-01 RX ORDER — DEXAMETHASONE 2 MG/1
2 TABLET ORAL
Qty: 5 TABLET | Refills: 0 | Status: SHIPPED | OUTPATIENT
Start: 2023-03-01

## 2023-03-01 NOTE — TELEPHONE ENCOUNTER
Hi, this is [employer]  I was calling in regards to you because me and Rosalinda Trinh, my doctor, she told me to call if I ever wanted to try any different muscle relaxers i'm calling in regard to try a different muscle relaxer  I have had a migraine for the past 3 days  My neck was swollen  It went away a little bit yesterday, but today I woke up with extreme pain in the back of my eye  Please call me back at [phone]  I will be in class till 12 o'clock PM  So if you can call anywhere by 12 or after that would be amazing  So I can actually speak to someone  Thank you so much I hope you have a great day and I'll talk to you soon   Bye

## 2023-03-01 NOTE — TELEPHONE ENCOUNTER
Please call patient back after 12 per her request   In the meantime, I sent Baclofen 10 mg to use every 8 hours as needed and Decadron 2 mg for next 5 days to her pharmacy    If she requires anything more from me, please advise

## 2023-03-02 NOTE — TELEPHONE ENCOUNTER
Called [phone] and left a detailed message on pt's answering machine of the below and  for a call back if any issues or concerns or if she needs anything else from [facility]

## 2023-04-10 DIAGNOSIS — M54.2 CERVICALGIA: ICD-10-CM

## 2023-04-10 RX ORDER — CYCLOBENZAPRINE HCL 10 MG
10 TABLET ORAL
Qty: 90 TABLET | Refills: 3 | Status: SHIPPED | OUTPATIENT
Start: 2023-04-10 | End: 2023-06-28 | Stop reason: SDUPTHER

## 2023-04-24 ENCOUNTER — OFFICE VISIT (OUTPATIENT)
Dept: DERMATOLOGY | Facility: CLINIC | Age: 28
End: 2023-04-24

## 2023-04-24 VITALS — BODY MASS INDEX: 23.73 KG/M2 | WEIGHT: 147 LBS

## 2023-04-24 DIAGNOSIS — D23.5 BENIGN NEOPLASM OF SKIN OF TRUNK, EXCEPT SCROTUM: ICD-10-CM

## 2023-04-24 DIAGNOSIS — Z87.2 HISTORY OF DERMATITIS: Primary | ICD-10-CM

## 2023-04-24 DIAGNOSIS — Z87.2 HISTORY OF DERMATITIS: ICD-10-CM

## 2023-04-24 RX ORDER — OMEPRAZOLE 20 MG/1
CAPSULE, DELAYED RELEASE ORAL
COMMUNITY
Start: 2023-03-29

## 2023-04-24 RX ORDER — POLYETHYLENE GLYCOL 3350 17 G/17G
POWDER, FOR SOLUTION ORAL
COMMUNITY
Start: 2022-12-22

## 2023-04-24 RX ORDER — PRAMOXINE HYDROCHLORIDE 10 MG/ML
LOTION TOPICAL
Qty: 222 ML | Refills: 0 | Status: SHIPPED | OUTPATIENT
Start: 2023-04-24

## 2023-04-24 RX ORDER — CLOBETASOL PROPIONATE 0.5 MG/G
1 OINTMENT TOPICAL 2 TIMES DAILY
COMMUNITY
Start: 2023-01-16

## 2023-04-24 RX ORDER — SECUKINUMAB 150 MG/ML
INJECTION SUBCUTANEOUS
COMMUNITY
Start: 2023-03-17

## 2023-04-24 RX ORDER — LEVONORGESTREL 19.5 MG/1
INTRAUTERINE DEVICE INTRAUTERINE
COMMUNITY

## 2023-04-24 NOTE — PROGRESS NOTES
"[facility] Note     Patient Name: Linzy Leventhal  Encounter Date: 4/24/2023    Have you been cared for by a [place] [address] Dermatologist in the last 3 years and, if so, which description applies to you? NO  I am considered a \"new\" patient and must complete all patient intake questions  I am FEMALE/of child-bearing potential     REVIEW OF SYSTEMS:  Have you recently had or currently have any of the following? · Recent fever or chills? No  · Any non-healing wound? No  · Are you pregnant or planning to become pregnant? No  · Are you currently or planning to be nursing or breast feeding? No   PAST MEDICAL HISTORY:  Have you personally ever had or currently have any of the following? If \"YES,\" then please provide more detail  · Skin cancer (such as Melanoma, Basal Cell Carcinoma, Squamous Cell Carcinoma? No  · Tuberculosis, HIV/AIDS, Hepatitis B or C: No  · Systemic Immunosuppression such as Diabetes, Biologic or Immunotherapy, Chemotherapy, Organ Transplantation, Bone Marrow Transplantation No  · Radiation Treatment No   FAMILY HISTORY:  Any \"first degree relatives\" (parent, brother, sister, or child) with the following? • Skin Cancer, Pancreatic or Other Cancer? No   PATIENT EXPERIENCE:    • Do you want the Dermatologist to perform a COMPLETE skin exam today including a clinical examination under the \"bra and underwear\" areas? NO  • If necessary, do we have your permission to call and leave a detailed message on your Preferred Phone number that includes your specific medical information?   Yes      Allergies   Allergen Reactions   • Contrast [Iodinated Contrast Media] Shortness Of Breath   • Bee Pollen      Other reaction(s): Rhinitis (Runny Nose, Stuffy Nose, Sneezing)   • Pollen Extract      Other reaction(s): Rhinitis (Runny Nose, Stuffy Nose, Sneezing)      Current Outpatient Medications:   •  albuterol (PROVENTIL HFA,VENTOLIN HFA) 90 mcg/act inhaler, Inhale 1 puff every 4 (four) hours " as needed for shortness of breath, Disp: , Rfl:   •  BOTOX 200 units SOLR, , Disp: , Rfl:   •  Certolizumab Pegol (CIMZIA SC), Inject under the skin 2 syringes every 4 weeks, Disp: , Rfl:   •  CIMZIA PREFILLED 2 X 200 MG/ML, , Disp: , Rfl:   •  clonazePAM (KlonoPIN) 0 5 mg tablet, Take 0 5 mg by mouth 2 (two) times a day as needed, Disp: , Rfl:   •  CRYSELLE-28 0 3-30 MG-MCG per tablet, Take 1 tablet by mouth daily (Patient not taking: Reported on 10/27/2021), Disp: , Rfl:   •  cyclobenzaprine (FLEXERIL) 10 mg tablet, Take 1 tablet (10 mg total) by mouth daily at bedtime, Disp: 90 tablet, Rfl: 3  •  dexamethasone (DECADRON) 1 mg tablet, Take 1 tablet (1 mg total) by mouth daily as needed (for migraine on day 2) (Patient not taking: Reported on 10/27/2021), Disp: 5 tablet, Rfl: 0  •  dexamethasone (DECADRON) 2 mg tablet, Take 1 tablet (2 mg total) by mouth daily with breakfast, Disp: 5 tablet, Rfl: 0  •  doxepin (SINEquan) 10 mg capsule, Take 1 capsule (10 mg total) by mouth daily at bedtime (Patient not taking: Reported on 10/27/2021), Disp: 30 capsule, Rfl: 0  •  HYDROcodone-acetaminophen (NORCO) 5-325 mg per tablet, , Disp: , Rfl:   •  ibuprofen (MOTRIN) 800 mg tablet, Take 800 mg by mouth as needed for mild pain (less then 3 times a week) (Patient not taking: Reported on 10/27/2021), Disp: , Rfl:   •  indomethacin (INDOCIN) 25 mg capsule, Take 1 capsule (25 mg total) by mouth 3 (three) times a day with meals, Disp: 20 capsule, Rfl: 3  •  magnesium oxide (MAG-OX) 400 mg, Take 1 tablet (400 mg total) by mouth daily (Patient not taking: Reported on 1/7/2020), Disp: 90 tablet, Rfl: 3  •  magnesium oxide (MAG-OX) 400 mg, Take 1 tablet (400 mg total) by mouth 2 (two) times a day (Patient not taking: Reported on 3/12/2020), Disp: 60 tablet, Rfl: 6  •  methylPREDNISolone (MEDROL) 32 MG tablet, Take 1 tablet (32 mg total) by mouth daily Take 1 tab day before CT 1 tab 13 hours before and 1 tab 1 hour prior (Patient not taking: Reported on 9/9/2020), Disp: 3 tablet, Rfl: 0  •  Norgestrel-Ethinyl Estradiol (CRYSELLE-28 PO), Take 1 tablet by mouth daily  (Patient not taking: Reported on 10/27/2021), Disp: , Rfl:   •  oxyCODONE-acetaminophen (PERCOCET) 5-325 mg per tablet, Take 0 25 tablets by mouth as needed for moderate pain  (Patient not taking: Reported on 10/27/2021), Disp: , Rfl:   •  prochlorperazine (COMPAZINE) 10 mg tablet, Take 1 tablet (10 mg total) by mouth every 6 (six) hours as needed for nausea or vomiting (Patient not taking: Reported on 10/27/2021), Disp: 12 tablet, Rfl: 3  •  Riboflavin (VITAMIN B-2) 100 MG TABS, 2 in the morning and 2 at bedtime (Patient not taking: Reported on 9/9/2020), Disp: 120 tablet, Rfl: 6  •  rizatriptan (MAXALT) 10 MG tablet, Take 1 tablet (10 mg total) by mouth once as needed for migraine May repeat in 2 hours if needed  Max 2/24 hours, 9/month , Disp: 9 tablet, Rfl: 3  •  rizatriptan (MAXALT) 10 mg tablet, Take 1 tablet (10 mg total) by mouth once as needed for migraine May repeat in 2 hours if needed  Max 2/24 hours, 9/month , Disp: 9 tablet, Rfl: 6  •  tiZANidine (ZANAFLEX) 4 mg tablet, Take 1 tablet (4 mg total) by mouth daily at bedtime (Patient not taking: Reported on 10/27/2021), Disp: 30 tablet, Rfl: 1  •  TRINTELLIX 10 MG tablet, Take 10 mg by mouth daily, Disp: , Rfl:   •  zolpidem (Ambien) 10 mg tablet, Take 10 mg by mouth daily at bedtime as needed for sleep, Disp: , Rfl:           • Whom besides the patient is providing clinical information about today's encounter?   o NO ADDITIONAL HISTORIAN (patient alone provided history)    Physical Exam and Assessment/Plan by Diagnosis:    HISTORY OF RASH    Physical Exam:  • Anatomic Location Affected: Face  • Morphological Description: Normal skin appearance  • Pertinent Positives:  • Pertinent Negatives:     Additional History of Present Condition:  Red patches are presents since the beginning of  February 2023 on upper eyelids, and "right lower eyelid  PCP started her on steroids  She started to take the medrol dose pack mid February for 10 days  She feels the medrol dose pack did not help much (took the redness down)  She keeps moisturizering with Aquaphor twice a day, which helps  The patches are red, itchy, dry and peeling  Last  active 2 months ago  Assessment and Plan:  Based on a thorough discussion of this condition and the management approach to it (including a comprehensive discussion of the known risks, side effects and potential benefits of treatment), the patient (family) agrees to implement the following specific plan:  • Recommended allergy testing if this continues  • Stat topical cream           MELANOCYTIC NEVus (\"Mole\")    Physical Exam:  • Anatomic Location Affected:   Back   • Morphological Description:    • Pertinent Positives:  0 6 cm rubber papule  • Pertinent Negatives: Nontender upon palpation    Additional History of Present Condition:  Present since she was born, but for the last 6 years lesion on back has changed and it became raise and painful to the touch  Assessment and Plan:  Based on a thorough discussion of this condition and the management approach to it (including a comprehensive discussion of the known risks, side effects and potential benefits of treatment), the patient (family) agrees to implement the following specific plan:  • Consistent with a small congenital melanocytic nevus, nontender  • Discussed treatment option   • Shave excision/removal of lesion as a procedure short visit, at request of patient  •   •   •          Scribe Attestation    I,:  Suzanne Mccall am acting as a scribe while in the presence of the attending physician :       I,:  Farzana Yanes MD personally performed the services described in this documentation    as scribed in my presence  :           "

## 2023-05-08 ENCOUNTER — TELEPHONE (OUTPATIENT)
Dept: NEUROLOGY | Facility: CLINIC | Age: 28
End: 2023-05-08

## 2023-05-08 NOTE — TELEPHONE ENCOUNTER
[address] [place] [address] and scheduled delivery with Beena for:    Botox-200 units  Qty:1  [facility]  Scheduled for 5/10/2023  Via: [employer]    Please advise if medication doesn't arrive

## 2023-05-10 NOTE — TELEPHONE ENCOUNTER
Botox number of units: 200 units  Botox quantity: 1  Arrived at what location: [facility]  Botox at Correct Administering Location: Yes  [address] number: [id number]  Lot number: [id number]  Expiration Date: 12/01/2025  Appt notes indicate correct medication: Yes

## 2023-05-15 ENCOUNTER — PROCEDURE VISIT (OUTPATIENT)
Dept: NEUROLOGY | Facility: CLINIC | Age: 28
End: 2023-05-15

## 2023-05-15 VITALS — HEART RATE: 94 BPM | DIASTOLIC BLOOD PRESSURE: 64 MMHG | SYSTOLIC BLOOD PRESSURE: 118 MMHG | TEMPERATURE: 98.3 F

## 2023-05-15 DIAGNOSIS — G43.709 CHRONIC MIGRAINE WITHOUT AURA WITHOUT STATUS MIGRAINOSUS, NOT INTRACTABLE: Primary | ICD-10-CM

## 2023-05-15 NOTE — PROGRESS NOTES
"  Universal Protocol   Consent: Verbal consent obtained  Written consent obtained  Risks and benefits: risks, benefits and alternatives were discussed  Consent given by: patient  Time out: Immediately prior to procedure a \"time out\" was called to verify the correct patient, procedure, equipment, support staff and site/side marked as required  Patient understanding: patient states understanding of the procedure being performed  Patient consent: the patient's understanding of the procedure matches consent given  Procedure consent: procedure consent matches procedure scheduled  Relevant documents: relevant documents present and verified  Patient identity confirmed: verbally with patient        Chemodenervation     Date/Time 5/15/2023 2:30 PM     Performed by  Dustin Pat PA-C     Authorized by Dustin Pat PA-C        Pre-procedure details      Prepped With: Alcohol     Anesthesia  (see MAR for exact dosages):      Anesthesia method:  None   Procedure details     Position:  Upright   Botox     Botox Type:  Type A    Brand:  Botox    mL's of Botulinum Toxin:  200    Final Concentration per CC:  50 units    Needle Gauge:  30 G 2 5 inch   Procedures     Botox Procedures: chronic headache      Indications: migraines     Injection Location      Head / Face:  L superior cervical paraspinal, R superior cervical paraspinal, L , R , L frontalis, R frontalis, L medial occipitalis, R medial occipitalis, procerus, R temporalis, L temporalis, R superior trapezius and L superior trapezius    L  injection amount:  5 unit(s)    R  injection amount:  5 unit(s)    L lateral frontalis:  5 unit(s)    R lateral frontalis:  5 unit(s)    L medial frontalis:  5 unit(s)    R medial frontalis:  5 unit(s)    L temporalis injection amount:  20 unit(s)    R temporalis injection amount:  20 unit(s)    Procerus injection amount:  5 unit(s)    L medial occipitalis injection amount:  15 unit(s)    R medial " occipitalis injection amount:  15 unit(s)    L superior cervical paraspinal injection amount:  10 unit(s)    R superior cervical paraspinal injection amount:  10 unit(s)    L superior trapezius injection amount:  15 unit(s)    R superior trapezius injection amount:  15 unit(s)   Total Units     Total units used:  200    Total units discarded:  0   Post-procedure details      Chemodenervation:  Chronic migraine    Facial Nerve Location[de-identified]  Bilateral facial nerve    Patient tolerance of procedure:   Tolerated well, no immediate complications   Comments      5 units SCM bilaterally  5 units splenius bilaterally  5 units left scalene  20 units trapezius  All medically necessary

## 2023-06-28 ENCOUNTER — TELEMEDICINE (OUTPATIENT)
Dept: NEUROLOGY | Facility: CLINIC | Age: 28
End: 2023-06-28
Payer: MEDICARE

## 2023-06-28 DIAGNOSIS — F41.1 GAD (GENERALIZED ANXIETY DISORDER): ICD-10-CM

## 2023-06-28 DIAGNOSIS — G43.009 MIGRAINE WITHOUT AURA AND WITHOUT STATUS MIGRAINOSUS, NOT INTRACTABLE: Primary | ICD-10-CM

## 2023-06-28 DIAGNOSIS — M54.2 CERVICALGIA: ICD-10-CM

## 2023-06-28 DIAGNOSIS — F43.10 PTSD (POST-TRAUMATIC STRESS DISORDER): ICD-10-CM

## 2023-06-28 PROBLEM — S06.0XAA CONCUSSION: Status: RESOLVED | Noted: 2017-06-14 | Resolved: 2023-06-28

## 2023-06-28 PROCEDURE — 99214 OFFICE O/P EST MOD 30 MIN: CPT | Performed by: PHYSICIAN ASSISTANT

## 2023-06-28 RX ORDER — RIZATRIPTAN BENZOATE 10 MG/1
TABLET ORAL
Qty: 9 TABLET | Refills: 6 | Status: SHIPPED | OUTPATIENT
Start: 2023-06-28

## 2023-06-28 RX ORDER — CYCLOBENZAPRINE HCL 10 MG
TABLET ORAL
Qty: 120 TABLET | Refills: 3 | Status: SHIPPED | OUTPATIENT
Start: 2023-06-28

## 2023-06-28 RX ORDER — RIMEGEPANT SULFATE 75 MG/75MG
75 TABLET, ORALLY DISINTEGRATING ORAL AS NEEDED
Qty: 16 TABLET | Refills: 6 | Status: SHIPPED | OUTPATIENT
Start: 2023-06-28

## 2023-06-28 NOTE — PATIENT INSTRUCTIONS
"Preventative:  Continue medications per other providers  Cyclobenzaprine 10 mg at bedtime  Botox every 3 months    Abortive: At onset of migraine take Nurtec 75 mg  Limit of 1 in 24 hours  May use rizatriptan if this fails  May repeat in 2 hours if needed  Less than 3 doses a week or 9 a month  May use an extra dose of cyclobenzaprine 10 mg as needed but only 10 times in a month maximum    Medication overuse headaches:   - We discussed medication overuse headache [facility]) and how to avoid it in the future  It was explained that all analgesics have the potential to cause medication overuse headache [facility]) and analgesic overuse can negate the effectiveness of headache preventive measures  After successful [address] treatment, preventive medications for an underlying primary headache disorder have a greater chance for success  Avoid medications with narcotics, barbiturates, or caffeine in them as these can cause rebound headaches after very few doses and can interfere with other headache medicine efficacy  Taking any analgesics for more than 2-3 days a week can cause medication overuse headache  Reproductive age women: Should take folic acid daily when taking anti-seizure drugs especially Depakote  [place] Prescription Drug Monitoring Program report was reviewed and was appropriate      Headache management instructions  - When patient has a moderate to severe headache, they should seek rest, initiate relaxation and apply cold compresses to the head  - Maintain regular sleep schedule  Adults need at least 7-8 hours of uninterrupted a night  - Limit over the counter medications such as Tylenol, Ibuprofen, Aleve, Excedrin  (No more than 3 times a week)  - Maintain headache diary  We discussed an PRIYA for a smart phone is \"Migraine buddy\"  - Limit caffeine to 1-2 cups 8 to 16 oz a day or less  - Avoid dietary trigger  (aged cheese, peanuts, MSG, aspartame and nitrates)    - Patient is to have regular frequent " meals to prevent headache onset  - Please drink at least 64 ounces of water a day to help remain hydrated  Please call with any questions or concerns   Office number is [phone]
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
## 2023-06-28 NOTE — ASSESSMENT & PLAN NOTE
Preventative:  Continue medications per other providers  Cyclobenzaprine 10 mg at bedtime  Botox every 3 months    Abortive: At onset of migraine take Nurtec 75 mg  Limit of 1 in 24 hours  May use rizatriptan if this fails  May repeat in 2 hours if needed  Less than 3 doses a week or 9 a month    May use an extra dose of cyclobenzaprine 10 mg as needed but only 10 times in a month maximum

## 2023-07-14 ENCOUNTER — TELEPHONE (OUTPATIENT)
Dept: NEUROLOGY | Facility: CLINIC | Age: 28
End: 2023-07-14

## 2023-07-14 NOTE — TELEPHONE ENCOUNTER
Received call from GINO MCDANIEL [facility] with delivery for pt Botox 7/20/2023. Pt current auth on file for  expires 7/15/2023 as Botox delivery set up before that. Will submit new auth for pt. Botox 200 UNITS  Qty. [id number]   Via: Ups/Fedex    Please advise if Botox does not arrive. Thank you.

## 2023-07-20 ENCOUNTER — TELEPHONE (OUTPATIENT)
Dept: NEUROLOGY | Facility: CLINIC | Age: 28
End: 2023-07-20

## 2023-07-20 NOTE — TELEPHONE ENCOUNTER
Pt current auth on file has  7/15/2023. A new [place] Edda has been sent to pt plan [facility]. Will continue to follow up for approval/denial determination.

## 2023-07-20 NOTE — TELEPHONE ENCOUNTER
Botox number of units: 200  Botox quantity: 1  Arrived at what location: [facility]  Botox at Correct Administering Location: yes  NDC number: 2249-1649-02  Lot number: [id number]  Expiration Date: 02/01/2026  Appt notes indicate correct medication: yes

## 2023-07-21 NOTE — TELEPHONE ENCOUNTER
Received fax from [facility] with Approval details below, also scanned in chart the selected pharmacy used for pt by plan;[facility]. Approved   Botox 200 UNITS  Qty. 1  Auth# [id number]  Valid:7/20/2023-7/20/2024  Visits: 4    Please use [facility]

## 2023-08-14 ENCOUNTER — PROCEDURE VISIT (OUTPATIENT)
Dept: NEUROLOGY | Facility: CLINIC | Age: 28
End: 2023-08-14

## 2023-08-14 VITALS — SYSTOLIC BLOOD PRESSURE: 120 MMHG | DIASTOLIC BLOOD PRESSURE: 64 MMHG | HEART RATE: 80 BPM | TEMPERATURE: 98.9 F

## 2023-08-14 DIAGNOSIS — G43.709 CHRONIC MIGRAINE WITHOUT AURA WITHOUT STATUS MIGRAINOSUS, NOT INTRACTABLE: Primary | ICD-10-CM

## 2023-08-14 NOTE — PROGRESS NOTES
Universal Protocol   Consent: Verbal consent obtained. Written consent obtained. Risks and benefits: risks, benefits and alternatives were discussed  Consent given by: patient  Time out: Immediately prior to procedure a "time out" was called to verify the correct patient, procedure, equipment, support staff and site/side marked as required. Patient understanding: patient states understanding of the procedure being performed  Patient consent: the patient's understanding of the procedure matches consent given  Procedure consent: procedure consent matches procedure scheduled  Relevant documents: relevant documents present and verified  Patient identity confirmed: verbally with patient        Chemodenervation     Date/Time 8/14/2023 2:30 PM     Performed by  Prachi Hermosillo PA-C   Authorized by Prachi Hermosillo PA-C       Pre-procedure details      Prepped With: Alcohol     Anesthesia  (see MAR for exact dosages):      Anesthesia method:  None   Procedure details     Position:  Upright   Botox     Botox Type:  Type A    Brand:  Botox    mL's of Botulinum Toxin:  200    Final Concentration per CC:  50 units    Needle Gauge:  30 G 2.5 inch   Procedures     Botox Procedures: chronic headache      Indications: migraines     Injection Location      Head / Face:  L superior cervical paraspinal, R superior cervical paraspinal, L , R , L frontalis, R frontalis, L medial occipitalis, R medial occipitalis, procerus, R temporalis, L temporalis, R superior trapezius and L superior trapezius    L  injection amount:  5 unit(s)    R  injection amount:  5 unit(s)    L lateral frontalis:  5 unit(s)    R lateral frontalis:  5 unit(s)    L medial frontalis:  5 unit(s)    R medial frontalis:  5 unit(s)    L temporalis injection amount:  20 unit(s)    R temporalis injection amount:  20 unit(s)    Procerus injection amount:  5 unit(s)    L medial occipitalis injection amount:  15 unit(s)    R medial occipitalis injection amount:  15 unit(s)    L superior cervical paraspinal injection amount:  10 unit(s)    R superior cervical paraspinal injection amount:  10 unit(s)    L superior trapezius injection amount:  15 unit(s)    R superior trapezius injection amount:  15 unit(s)   Total Units     Total units used:  200    Total units discarded:  0   Post-procedure details      Chemodenervation:  Chronic migraine    Facial Nerve Location[de-identified]  Bilateral facial nerve    Patient tolerance of procedure:   Tolerated well, no immediate complications   Comments       5 units SCM bilaterally  5 units splenius bilaterally  5 units left scalene  20 units trapezius  All medically necessary

## 2023-09-20 ENCOUNTER — TELEPHONE (OUTPATIENT)
Dept: NEUROLOGY | Facility: CLINIC | Age: 28
End: 2023-09-20

## 2023-09-20 NOTE — TELEPHONE ENCOUNTER
Recd vm 9/19 taken off 9/20   I'm calling from the pharmacy. I am calling in regards to a mutual patient. Patient's name is Gracie Monaco, YOB: 1995. And I'm just calling to see when  patient is next due for  botox injections. So we can go ahead and schedule the next refill. If you can, please give me a call back. My number is [phone].

## 2023-09-29 NOTE — TELEPHONE ENCOUNTER
9/28/23 10:02 am  Hi, good morning. This is audio calling from [address], [address]. I am calling in regards to manan adams, date of birth is [id number]. And this message is in regards to her botox, injection. I believe someone had called this morning, but we got disconnected when the call is being transferred. So I'm just returning the call regarding this patient. If you can, please give me a call back. My number is [phone]. Thank you.  _________________________________________    Returning call re:  Botox

## 2023-11-13 ENCOUNTER — PROCEDURE VISIT (OUTPATIENT)
Dept: NEUROLOGY | Facility: CLINIC | Age: 28
End: 2023-11-13
Payer: MEDICARE

## 2023-11-13 VITALS — SYSTOLIC BLOOD PRESSURE: 126 MMHG | TEMPERATURE: 98.6 F | HEART RATE: 89 BPM | DIASTOLIC BLOOD PRESSURE: 74 MMHG

## 2023-11-13 DIAGNOSIS — R76.8 POSITIVE ANA (ANTINUCLEAR ANTIBODY): ICD-10-CM

## 2023-11-13 DIAGNOSIS — D89.89 AUTOIMMUNE DISORDER (HCC): ICD-10-CM

## 2023-11-13 DIAGNOSIS — G43.709 CHRONIC MIGRAINE WITHOUT AURA WITHOUT STATUS MIGRAINOSUS, NOT INTRACTABLE: Primary | ICD-10-CM

## 2023-11-13 PROCEDURE — 64615 CHEMODENERV MUSC MIGRAINE: CPT | Performed by: PHYSICIAN ASSISTANT

## 2023-11-13 NOTE — PROGRESS NOTES
Universal Protocol   Consent: Verbal consent obtained. Written consent obtained. Risks and benefits: risks, benefits and alternatives were discussed  Consent given by: patient  Time out: Immediately prior to procedure a "time out" was called to verify the correct patient, procedure, equipment, support staff and site/side marked as required. Patient understanding: patient states understanding of the procedure being performed  Patient consent: the patient's understanding of the procedure matches consent given  Procedure consent: procedure consent matches procedure scheduled  Relevant documents: relevant documents present and verified  Patient identity confirmed: verbally with patient      Chemodenervation     Date/Time  11/13/2023 2:30 PM     Performed by  Gita Mari PA-C   Authorized by  Gita Mari PA-C     Pre-procedure details      Prepped With: Alcohol     Anesthesia  (see MAR for exact dosages):      Anesthesia method:  None   Procedure details      Position:  Upright   Botox      Botox Type:  Type A    Brand:  Botox    mL's of Botulinum Toxin:  200    Final Concentration per CC:  50 units    Needle Gauge:  30 G 2.5 inch   Procedures      Botox Procedures: chronic headache      Indications: migraines     Injection Location      Head / Face:  L superior cervical paraspinal, R superior cervical paraspinal, L , R , L frontalis, R frontalis, L medial occipitalis, R medial occipitalis, procerus, R temporalis, L temporalis, R superior trapezius and L superior trapezius    L  injection amount:  5 unit(s)    R  injection amount:  5 unit(s)    L lateral frontalis:  5 unit(s)    R lateral frontalis:  5 unit(s)    L medial frontalis:  5 unit(s)    R medial frontalis:  5 unit(s)    L temporalis injection amount:  20 unit(s)    R temporalis injection amount:  20 unit(s)    Procerus injection amount:  5 unit(s)    L medial occipitalis injection amount:  15 unit(s)    R medial occipitalis injection amount:  15 unit(s)    L superior cervical paraspinal injection amount:  10 unit(s)    R superior cervical paraspinal injection amount:  10 unit(s)    L superior trapezius injection amount:  15 unit(s)    R superior trapezius injection amount:  15 unit(s)   Total Units      Total units used:  200    Total units discarded:  0   Post-procedure details      Chemodenervation:  Chronic migraine    Facial Nerve Location[de-identified]  Bilateral facial nerve    Patient tolerance of procedure:   Tolerated well, no immediate complications   Comments       5 units SCM bilaterally  5 units splenius bilaterally  5 units left scalene  20 units trapezius  All medically necessary

## 2023-11-17 ENCOUNTER — PATIENT MESSAGE (OUTPATIENT)
Dept: NEUROLOGY | Facility: CLINIC | Age: 28
End: 2023-11-17

## 2023-11-17 DIAGNOSIS — R76.8 POSITIVE ANA (ANTINUCLEAR ANTIBODY): Primary | ICD-10-CM

## 2023-11-17 DIAGNOSIS — D89.89 AUTOIMMUNE DISORDER (HCC): ICD-10-CM

## 2024-01-16 ENCOUNTER — TELEPHONE (OUTPATIENT)
Dept: NEUROLOGY | Facility: CLINIC | Age: 29
End: 2024-01-16

## 2024-01-16 NOTE — TELEPHONE ENCOUNTER
Received VM transcription from 1/12/24, 2:58 PM:     Hi, this is Aleksandra calling from [facility]. This message is regarding mutual pt Florencia Reece. YOB: 1995. I am calling to see when this patient is next scheduled for her botox so we can go ahead and schedule the next delivery. If you can, please give us a call back, [phone]. Thank you.

## 2024-01-18 NOTE — TELEPHONE ENCOUNTER
Contacted [facility], spoke to Nubia that advised she will be scheduling pt Botox for delivery 1/24/2024.        Botox 200 UNITS  Qty.1  Scheduled:1/24/2024  Location:[facility]   Via: [employer]    Please advise if Botox does not arrive.    Thank you.

## 2024-01-24 NOTE — TELEPHONE ENCOUNTER
1/23 at 10:59 am:    I'm calling from [facility]. I am calling in regards to patient Florencia Schilling, YOB: 1995. I know that I had scheduled her Botox to go out today for tomorrows delivery. Unfortunately, we did not have the medication in stock to send out today, so we're going to be sending it out tomorrow instead. So the delivery will be on Thursday, the 25th. If you have any questions, please give me a call back at [phone]. Thank you.

## 2024-01-25 NOTE — TELEPHONE ENCOUNTER
Botox number of units: 200  Botox quantity: 1  Arrived at what location: [place]  Botox at Correct Administering Location: yes  NDC number: [id number]   Lot number: [id number]  Expiration Date: 06/01/2026  Appt notes indicate correct medication: yes

## 2024-02-15 ENCOUNTER — PROCEDURE VISIT (OUTPATIENT)
Dept: NEUROLOGY | Facility: CLINIC | Age: 29
End: 2024-02-15
Payer: MEDICARE

## 2024-02-15 VITALS — SYSTOLIC BLOOD PRESSURE: 110 MMHG | DIASTOLIC BLOOD PRESSURE: 65 MMHG | HEART RATE: 88 BPM | TEMPERATURE: 98.8 F

## 2024-02-15 DIAGNOSIS — G43.709 CHRONIC MIGRAINE WITHOUT AURA WITHOUT STATUS MIGRAINOSUS, NOT INTRACTABLE: Primary | ICD-10-CM

## 2024-02-15 PROCEDURE — 64615 CHEMODENERV MUSC MIGRAINE: CPT | Performed by: PHYSICIAN ASSISTANT

## 2024-02-15 NOTE — PROGRESS NOTES
"Universal Protocol   Consent: Verbal consent obtained. Written consent obtained.  Risks and benefits: risks, benefits and alternatives were discussed  Consent given by: patient  Time out: Immediately prior to procedure a \"time out\" was called to verify the correct patient, procedure, equipment, support staff and site/side marked as required.  Patient understanding: patient states understanding of the procedure being performed  Patient consent: the patient's understanding of the procedure matches consent given  Procedure consent: procedure consent matches procedure scheduled  Relevant documents: relevant documents present and verified  Patient identity confirmed: verbally with patient      Chemodenervation     Date/Time  2/15/2024 12:30 PM     Performed by  Katy Voss PA-C   Authorized by  Katy Voss PA-C     Pre-procedure details      Prepped With: Alcohol     Anesthesia  (see MAR for exact dosages):     Anesthesia method:  None   Procedure details      Position:  Upright   Botox      Botox Type:  Type A    Brand:  Botox    mL's of Botulinum Toxin:  200    Final Concentration per CC:  50 units    Needle Gauge:  30 G 2.5 inch   Procedures      Botox Procedures: chronic headache      Indications: migraines     Injection Location      Head / Face:  L superior cervical paraspinal, R superior cervical paraspinal, L , R , L frontalis, R frontalis, L medial occipitalis, R medial occipitalis, procerus, R temporalis, L temporalis, R superior trapezius and L superior trapezius    L  injection amount:  5 unit(s)    R  injection amount:  5 unit(s)    L lateral frontalis:  5 unit(s)    R lateral frontalis:  5 unit(s)    L medial frontalis:  5 unit(s)    R medial frontalis:  5 unit(s)    L temporalis injection amount:  20 unit(s)    R temporalis injection amount:  20 unit(s)    Procerus injection amount:  5 unit(s)    L medial occipitalis injection amount:  15 unit(s)    R medial " occipitalis injection amount:  15 unit(s)    L superior cervical paraspinal injection amount:  10 unit(s)    R superior cervical paraspinal injection amount:  10 unit(s)    L superior trapezius injection amount:  15 unit(s)    R superior trapezius injection amount:  15 unit(s)   Total Units      Total units used:  200    Total units discarded:  0   Post-procedure details      Chemodenervation:  Chronic migraine    Facial Nerve Location::  Bilateral facial nerve    Patient tolerance of procedure:  Tolerated well, no immediate complications   Comments        5 units SCM bilaterally  5 units splenius bilaterally  5 units cervical paraspinalis  20 units trapezius  All medically necessary

## 2024-02-18 ENCOUNTER — OFFICE VISIT (OUTPATIENT)
Dept: URGENT CARE | Age: 29
End: 2024-02-18
Payer: MEDICARE

## 2024-02-18 ENCOUNTER — APPOINTMENT (OUTPATIENT)
Dept: RADIOLOGY | Age: 29
End: 2024-02-18
Payer: MEDICARE

## 2024-02-18 VITALS
SYSTOLIC BLOOD PRESSURE: 126 MMHG | HEART RATE: 93 BPM | TEMPERATURE: 98.8 F | OXYGEN SATURATION: 99 % | RESPIRATION RATE: 20 BRPM | DIASTOLIC BLOOD PRESSURE: 82 MMHG

## 2024-02-18 DIAGNOSIS — R50.9 FEVER, UNSPECIFIED FEVER CAUSE: ICD-10-CM

## 2024-02-18 DIAGNOSIS — B34.9 VIRAL SYNDROME: Primary | ICD-10-CM

## 2024-02-18 DIAGNOSIS — R06.09 DYSPNEA ON EXERTION: ICD-10-CM

## 2024-02-18 PROCEDURE — 99213 OFFICE O/P EST LOW 20 MIN: CPT

## 2024-02-18 PROCEDURE — 87636 SARSCOV2 & INF A&B AMP PRB: CPT

## 2024-02-18 PROCEDURE — 71046 X-RAY EXAM CHEST 2 VIEWS: CPT

## 2024-02-18 RX ORDER — ALBUTEROL SULFATE 90 UG/1
2 AEROSOL, METERED RESPIRATORY (INHALATION) EVERY 6 HOURS PRN
Qty: 8.5 G | Refills: 0 | Status: SHIPPED | OUTPATIENT
Start: 2024-02-18

## 2024-02-18 NOTE — PATIENT INSTRUCTIONS
Please allow 24 to 48 hours for COVID and flu test result.  If COVID test is positive, please quarantine for 5 days.  If flu test positive, please quarantine till you are fever free for 24 hours without fever reducing medication.  Please  alternate ibuprofen and Tylenol as needed for fever.  Please trial Albuterol every 6 hours as needed for chest tightness.   Continue with OTC cough and cold medication.   Drink plenty of fluids.   Follow up with PCP in 3-5 days.  Proceed to  ER if symptoms worsen.

## 2024-02-18 NOTE — LETTER
February 18, 2024     Patient: Florencia Schilling   YOB: 1995   Date of Visit: 2/18/2024       To Whom it May Concern:    Florencia Schilling was seen in my clinic on 2/18/2024. She may return to school on 2/20/2024 as long as she is fever free for 24 hours without fever reducing medication .    If you have any questions or concerns, please don't hesitate to call.         Sincerely,          SILVIA Reyes        CC: No Recipients

## 2024-02-18 NOTE — PROGRESS NOTES
[facility]        NAME: Florencia Schilling is a 29 y.o. female  : 1995    MRN: [id number]  DATE: 2024  TIME: 4:40 PM    Assessment and Plan   Viral syndrome [B34.9]  1. Viral syndrome        2. Dyspnea on exertion  XR chest pa & lateral    albuterol (ProAir HFA) 90 mcg/act inhaler      3. Fever, unspecified fever cause  Covid/Flu- Office Collect Normal    Covid/Flu- Office Collect Normal        Chest x-ray reviewed, no acute cardiopulmonary disease identified, pending final read per radiology.    Patient Instructions     Please allow 24 to 48 hours for COVID and flu test result.  If COVID test is positive, please quarantine for 5 days.  If flu test positive, please quarantine till you are fever free for 24 hours without fever reducing medication.  Please  alternate ibuprofen and Tylenol as needed for fever.  Please trial Albuterol every 6 hours as needed for chest tightness.   Continue with OTC cough and cold medication.   Drink plenty of fluids.   Follow up with PCP in 3-5 days.  Proceed to  ER if symptoms worsen.    Chief Complaint     Chief Complaint   Patient presents with    Cough    Fever     Patient states that on Wednesday she developed a dry cough with chest congestion. She notes that her chest feels heavy and she is shallow breathing. She feels SOB easily with ambulation. She developed a fever Thursday and has been having fatigue and body aches.          History of Present Illness       29-year-old female presenting for evaluation of 5.  The past 4 days she has been experiencing fever, heat, chills, congestion, denies nasal drip, cough, chest tightness, shortness of breath, body aches and diarrhea.  She states her Tmax was 102.3.Patient has been taking Mucinex with mild relief of her symptoms.  Patient states that a friend was recently ill with similar symptoms.    Cough  Associated symptoms include chills, a fever, myalgias, postnasal drip and shortness of breath. Pertinent  negatives include no chest pain or sore throat.   Fever  Associated symptoms include chills, congestion, coughing, fatigue, a fever and myalgias. Pertinent negatives include no chest pain, nausea, sore throat or vomiting.       Review of Systems   Review of Systems   Constitutional:  Positive for chills, fatigue and fever.   HENT:  Positive for congestion and postnasal drip. Negative for sore throat.    Respiratory:  Positive for cough, chest tightness and shortness of breath.    Cardiovascular:  Negative for chest pain.   Gastrointestinal:  Positive for diarrhea. Negative for nausea and vomiting.   Musculoskeletal:  Positive for myalgias.         Current Medications       Current Outpatient Medications:     albuterol (ProAir HFA) 90 mcg/act inhaler, Inhale 2 puffs every 6 (six) hours as needed for wheezing or shortness of breath, Disp: 8.5 g, Rfl: 0    albuterol (PROVENTIL HFA,VENTOLIN HFA) 90 mcg/act inhaler, Inhale 1 puff every 4 (four) hours as needed for shortness of breath, Disp: , Rfl:     ASHWAGANDHA PO, Take 1,300 mg by mouth, Disp: , Rfl:     BOTOX 200 units SOLR, , Disp: , Rfl:     CIMZIA PREFILLED 2 X 200 MG/ML, , Disp: , Rfl:     clobetasol (TEMOVATE) 0.05 % ointment, Apply 1 application. topically if needed To affected area, Disp: , Rfl:     clonazePAM (KlonoPIN) 0.5 mg tablet, Take 0.5 mg by mouth 2 (two) times a day as needed, Disp: , Rfl:     Cosentyx Sensoready, 300 MG, 150 MG/ML SOAJ injection, , Disp: , Rfl:     cyclobenzaprine (FLEXERIL) 10 mg tablet, Take 1 tab at bedtime nightly and may use a second dose as needed but less than 10 times a month, Disp: 120 tablet, Rfl: 3    ibuprofen (MOTRIN) 800 mg tablet, Take 800 mg by mouth as needed for mild pain (less then 3 times a week), Disp: , Rfl:     levonorgestrel (Kyleena) 19.5 MG intrauterine device, by Intrauterine route, Disp: , Rfl:     Olive Leaf Extract 150 MG CAPS, Take by mouth, Disp: , Rfl:     oxyCODONE-acetaminophen (PERCOCET) 5-325 mg  per tablet, Take 0.25 tablets by mouth as needed for moderate pain, Disp: , Rfl:     polyethylene glycol (MiraLax) 17 GM/SCOOP powder, Take by mouth, Disp: , Rfl:     rimegepant sulfate (Nurtec) 75 mg TBDP, Take 1 tablet (75 mg total) by mouth as needed (migraine) Limit of 1 in 24 hours, Disp: 16 tablet, Rfl: 6    rizatriptan (MAXALT) 10 mg tablet, Take 1 tablet (10 mg total) by mouth once as needed for migraine May repeat in 2 hours if needed. Max 2/24 hours, 9/month., Disp: 9 tablet, Rfl: 6    tacrolimus (PROTOPIC) 0.03 % ointment, Apply topically 2 (two) times a day For up to 6 wk., Disp: 30 g, Rfl: 0    TRINTELLIX 10 MG tablet, Take 10 mg by mouth daily, Disp: , Rfl:     zolpidem (AMBIEN) 10 mg tablet, Take 10 mg by mouth daily at bedtime as needed for sleep, Disp: , Rfl:     Certolizumab Pegol (CIMZIA SC), Inject under the skin 2 syringes every 4 weeks (Patient not taking: Reported on 6/28/2023), Disp: , Rfl:     HYDROcodone-acetaminophen (NORCO) 5-325 mg per tablet, , Disp: , Rfl:     omeprazole (PriLOSEC) 20 mg delayed release capsule, TAKE 1 CAPSULE BY MOUTH BEFORE BREAKFAST (Patient not taking: Reported on 6/28/2023), Disp: , Rfl:     tiZANidine (ZANAFLEX) 4 mg tablet, Take 1 tablet (4 mg total) by mouth daily at bedtime (Patient not taking: Reported on 6/28/2023), Disp: 30 tablet, Rfl: 1    Current Allergies     Allergies as of 02/18/2024 - Reviewed 02/18/2024   Allergen Reaction Noted    Contrast [iodinated contrast media] Shortness Of Breath 09/11/2020    Bee pollen  06/01/2015    Pollen extract  06/01/2015            The following portions of the patient's history were reviewed and updated as appropriate: allergies, current medications, past family history, past medical history, past social history, past surgical history and problem list.     Past Medical History:   Diagnosis Date    Anxiety     Concussion 6/14/2017    Depression     GERD (gastroesophageal reflux disease)     Head injury     Headache,  tension-type 9yrs old    Migraines    Irritable bowel syndrome     Multiple gastric ulcers     Psoriasis        Past Surgical History:   Procedure Laterality Date    COLONOSCOPY         Family History   Problem Relation Age of Onset    Bipolar disorder Father     Alcohol abuse Father     Multiple sclerosis Father     Bipolar disorder Brother     Migraines Maternal Grandmother         Migraines/virago    Neuropathy Maternal Grandmother     Stroke Maternal Grandmother     Aneurysm Paternal Grandfather          Medications have been verified.        Objective   /82   Pulse 93   Temp 98.8 °F (37.1 °C)   Resp 20   SpO2 99%        Physical Exam     Physical Exam  Vitals and nursing note reviewed.   Constitutional:       General: She is not in acute distress.     Appearance: Normal appearance. She is normal weight. She is not ill-appearing, toxic-appearing or diaphoretic.   HENT:      Head: Normocephalic and atraumatic.      Right Ear: Tympanic membrane normal.      Left Ear: Tympanic membrane normal.      Nose: Congestion present. No rhinorrhea.      Mouth/Throat:      Mouth: Mucous membranes are moist.   Eyes:      General:         Right eye: No discharge.         Left eye: No discharge.   Cardiovascular:      Rate and Rhythm: Normal rate and regular rhythm.      Pulses: Normal pulses.      Heart sounds: Normal heart sounds. No murmur heard.     No friction rub. No gallop.   Pulmonary:      Effort: Pulmonary effort is normal. No respiratory distress.      Breath sounds: Normal breath sounds. No stridor. No wheezing, rhonchi or rales.   Chest:      Chest wall: No tenderness.   Abdominal:      General: Bowel sounds are normal.      Palpations: Abdomen is soft.      Tenderness: There is no abdominal tenderness.   Skin:     General: Skin is warm and dry.   Neurological:      Mental Status: She is alert.   Psychiatric:         Mood and Affect: Mood normal.         Behavior: Behavior normal.

## 2024-02-19 LAB
FLUAV RNA RESP QL NAA+PROBE: NEGATIVE
FLUBV RNA RESP QL NAA+PROBE: NEGATIVE
SARS-COV-2 RNA RESP QL NAA+PROBE: NEGATIVE

## 2024-03-26 DIAGNOSIS — G43.009 MIGRAINE WITHOUT AURA AND WITHOUT STATUS MIGRAINOSUS, NOT INTRACTABLE: Primary | ICD-10-CM

## 2024-03-27 RX ORDER — ONABOTULINUMTOXINA 200 [USP'U]/1
INJECTION, POWDER, LYOPHILIZED, FOR SOLUTION INTRADERMAL; INTRAMUSCULAR
Qty: 1 EACH | Refills: 2 | Status: SHIPPED | OUTPATIENT
Start: 2024-03-27

## 2024-03-30 ENCOUNTER — TELEPHONE (OUTPATIENT)
Dept: NEUROLOGY | Facility: CLINIC | Age: 29
End: 2024-03-30

## 2024-03-30 NOTE — TELEPHONE ENCOUNTER
3/27 at 10:59 am:     I'm calling from [facility]. I am calling in regards to patient Florencia Schilling, YOB: 1995. Calling to set up the next delivery for this patient's, Botox injections. So if you can please give me a call back. My number is [phone]. Thank you.

## 2024-04-02 NOTE — TELEPHONE ENCOUNTER
Aleksandra from [facility] called an states they will call mid April to schedule botox delivery.

## 2024-04-11 NOTE — TELEPHONE ENCOUNTER
Addended by: OLIVE ROCHA on: 4/11/2024 12:44 PM     Modules accepted: Orders     Good Morning,    Left message for patient to contact office to reschedule her Dr Karlie Karimi appt from 3/24      Thank you,  Dmitri Dunaway

## 2024-04-23 ENCOUNTER — TELEPHONE (OUTPATIENT)
Dept: NEUROLOGY | Facility: CLINIC | Age: 29
End: 2024-04-23

## 2024-04-23 NOTE — TELEPHONE ENCOUNTER
Contacted [facility], spoke to [facility] that advised pt delivery ready to be set for 4/25/2024.    Botox 200 UNITS  Qty.1  Scheduled:4/25/2024  Location:[facility]   Via: [employer]    Please advise if Botox does not arrive.    Thank you.

## 2024-04-25 NOTE — TELEPHONE ENCOUNTER
Received VM 4-22-24   9:55 am  Taken off 4-25-24   8:17 am    Hi, good morning. This [facility] calling in regards to patient, Florencia Rivera. Just calling to set up the next delivery for the botox injections. So if you can please give me a call back. My number is [phone]. Thank you.    Already addressed.

## 2024-04-25 NOTE — TELEPHONE ENCOUNTER
Botox number of units: 200  Botox quantity: 1  Arrived at what location: [place]  Botox at Correct Administering Location: yes  NDC number: 0206-7245-31  Lot number: [id number]  Expiration Date: 6/30/26  Appt notes indicate correct medication: yes

## 2024-05-06 ENCOUNTER — TELEPHONE (OUTPATIENT)
Dept: NEUROLOGY | Facility: CLINIC | Age: 29
End: 2024-05-06

## 2024-05-06 NOTE — TELEPHONE ENCOUNTER
Patient wants to r/s  botox appointment with [facility] on 5/16 at 2:pm. She has work that day at 12:45. Please call patient to schedule.

## 2024-05-16 ENCOUNTER — PROCEDURE VISIT (OUTPATIENT)
Dept: NEUROLOGY | Facility: CLINIC | Age: 29
End: 2024-05-16
Payer: MEDICARE

## 2024-05-16 VITALS — DIASTOLIC BLOOD PRESSURE: 72 MMHG | HEART RATE: 86 BPM | SYSTOLIC BLOOD PRESSURE: 102 MMHG | TEMPERATURE: 98.7 F

## 2024-05-16 DIAGNOSIS — M54.2 CERVICALGIA: ICD-10-CM

## 2024-05-16 DIAGNOSIS — G43.709 CHRONIC MIGRAINE WITHOUT AURA WITHOUT STATUS MIGRAINOSUS, NOT INTRACTABLE: Primary | ICD-10-CM

## 2024-05-16 PROCEDURE — 64615 CHEMODENERV MUSC MIGRAINE: CPT | Performed by: PHYSICIAN ASSISTANT

## 2024-05-16 RX ORDER — BACLOFEN 10 MG/1
10 TABLET ORAL 3 TIMES DAILY
Qty: 90 TABLET | Refills: 2 | Status: SHIPPED | OUTPATIENT
Start: 2024-05-16

## 2024-05-16 NOTE — PROGRESS NOTES
"Universal Protocol   Consent: Verbal consent obtained. Written consent obtained.  Risks and benefits: risks, benefits and alternatives were discussed  Consent given by: patient  Time out: Immediately prior to procedure a \"time out\" was called to verify the correct patient, procedure, equipment, support staff and site/side marked as required.  Patient understanding: patient states understanding of the procedure being performed  Patient consent: the patient's understanding of the procedure matches consent given  Procedure consent: procedure consent matches procedure scheduled  Relevant documents: relevant documents present and verified  Patient identity confirmed: verbally with patient      Chemodenervation     Date/Time  5/16/2024 10:00 AM     Performed by  Katy Voss PA-C   Authorized by  Katy Voss PA-C     Pre-procedure details      Prepped With: Alcohol     Anesthesia  (see MAR for exact dosages):     Anesthesia method:  None   Procedure details      Position:  Upright   Botox      Botox Type:  Type A    Brand:  Botox    mL's of Botulinum Toxin:  200    Final Concentration per CC:  50 units    Needle Gauge:  30 G 2.5 inch   Procedures      Botox Procedures: chronic headache      Indications: migraines     Injection Location      Head / Face:  L superior cervical paraspinal, R superior cervical paraspinal, L , R , L frontalis, R frontalis, L medial occipitalis, R medial occipitalis, procerus, R temporalis, L temporalis, R superior trapezius and L superior trapezius    L  injection amount:  5 unit(s)    R  injection amount:  5 unit(s)    L lateral frontalis:  5 unit(s)    R lateral frontalis:  5 unit(s)    L medial frontalis:  5 unit(s)    R medial frontalis:  5 unit(s)    L temporalis injection amount:  20 unit(s)    R temporalis injection amount:  20 unit(s)    Procerus injection amount:  5 unit(s)    L orbicularis oculi injection amount:  5 unit(s)    L medial " occipitalis injection amount:  15 unit(s)    R medial occipitalis injection amount:  15 unit(s)    L superior cervical paraspinal injection amount:  10 unit(s)    R superior cervical paraspinal injection amount:  10 unit(s)    L superior trapezius injection amount:  15 unit(s)    R superior trapezius injection amount:  15 unit(s)   Total Units      Total units used:  200    Total units discarded:  0   Post-procedure details      Chemodenervation:  Chronic migraine    Facial Nerve Location::  Bilateral facial nerve    Patient tolerance of procedure:  Tolerated well, no immediate complications   Comments        5 units SCM bilaterally  5 units splenius bilaterally  5 units cervical paraspinalis  20 units trapezius  All medically necessary

## 2024-05-21 ENCOUNTER — TELEPHONE (OUTPATIENT)
Dept: NEUROLOGY | Facility: CLINIC | Age: 29
End: 2024-05-21

## 2024-05-21 NOTE — TELEPHONE ENCOUNTER
5/21/24, 1:08    Hi, good afternoon. This is Florencia Schilling, birthday january, 2nd 1995. I was calling in reference to after getting my botox shots. I did bring up to Katy that I'm having a extreme pain in my shoulder and in my back. She gave me the baclofen and I have been taking it 3 times a day on top of my other my muscle relaxer. It has not gone down, I am in extreme pain. She told me to call. Nothing is working. So I am calling now. Please call me back at [phone]. Thank you. Have a great a day.

## 2024-05-21 NOTE — TELEPHONE ENCOUNTER
Since the baclofen is not working and she sees rheumatology as [facility], she should contact either her PCP or rheumatology as she has tried cyclobenzaprine, baclofen and tizanidine.  If in extreme pain she could consider ER

## 2024-05-26 DIAGNOSIS — G43.009 MIGRAINE WITHOUT AURA AND WITHOUT STATUS MIGRAINOSUS, NOT INTRACTABLE: ICD-10-CM

## 2024-05-28 RX ORDER — RIZATRIPTAN BENZOATE 10 MG/1
TABLET ORAL
Qty: 9 TABLET | Refills: 6 | Status: SHIPPED | OUTPATIENT
Start: 2024-05-28

## 2024-06-09 DIAGNOSIS — M54.2 CERVICALGIA: ICD-10-CM

## 2024-06-10 RX ORDER — BACLOFEN 10 MG/1
10 TABLET ORAL 3 TIMES DAILY
Qty: 270 TABLET | Refills: 1 | Status: SHIPPED | OUTPATIENT
Start: 2024-06-10

## 2024-06-14 ENCOUNTER — TELEPHONE (OUTPATIENT)
Dept: NEUROLOGY | Facility: CLINIC | Age: 29
End: 2024-06-14

## 2024-06-14 ENCOUNTER — TELEMEDICINE (OUTPATIENT)
Dept: NEUROLOGY | Facility: CLINIC | Age: 29
End: 2024-06-14
Payer: MEDICARE

## 2024-06-14 DIAGNOSIS — G43.E09 CHRONIC MIGRAINE WITH AURA WITHOUT STATUS MIGRAINOSUS, NOT INTRACTABLE: ICD-10-CM

## 2024-06-14 DIAGNOSIS — G43.009 MIGRAINE WITHOUT AURA AND WITHOUT STATUS MIGRAINOSUS, NOT INTRACTABLE: Primary | ICD-10-CM

## 2024-06-14 PROCEDURE — 99213 OFFICE O/P EST LOW 20 MIN: CPT | Performed by: PHYSICIAN ASSISTANT

## 2024-06-14 RX ORDER — IBUPROFEN 800 MG/1
800 TABLET ORAL AS NEEDED
Qty: 30 TABLET | Refills: 1 | Status: SHIPPED | OUTPATIENT
Start: 2024-06-14

## 2024-06-14 NOTE — TELEPHONE ENCOUNTER
Called patient to get her 1 year f/u scheduled with [facility], patient did not answer phone, left  with a call back number.
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
## 2024-06-14 NOTE — PATIENT INSTRUCTIONS
"Preventative:  Continue medications per other providers  Cyclobenzaprine 10 mg at bedtime  Botox every 3 months    Abortive:  At onset of migraine take Nurtec 75 mg.  Limit of 1 in 24 hours along with ibuprofen 800 mg  May use rizatriptan if this fails.  May repeat in 2 hours if needed.  Less than 3 doses a week or 9 a month.  May use an extra dose of cyclobenzaprine 10 mg as needed but only 10 times in a month maximum    Medication overuse headaches:   - We discussed medication overuse headache (MOH) and how to avoid it in the future. It was explained that all analgesics have the potential to cause medication overuse headache (MOH) and analgesic overuse can negate the effectiveness of headache preventive measures. After successful MOH treatment, preventive medications for an underlying primary headache disorder have a greater chance for success. Avoid medications with narcotics, barbiturates, or caffeine in them as these can cause rebound headaches after very few doses and can interfere with other headache medicine efficacy. Taking any analgesics for more than 2-3 days a week can cause medication overuse headache.     Reproductive age women: Should take folic acid daily when taking anti-seizure drugs especially Depakote.     [place] Prescription Drug Monitoring Program report was reviewed and was appropriate      Headache management instructions  - When patient has a moderate to severe headache, they should seek rest, initiate relaxation and apply cold compresses to the head.   - Maintain regular sleep schedule. Adults need at least 7-8 hours of uninterrupted a night.   - Limit over the counter medications such as Tylenol, Ibuprofen, Aleve, Excedrin. (No more than 3 times a week).  - Maintain headache diary.  We discussed an PRIYA for a smart phone is \"Migraine buddy\"  - Limit caffeine to 1-2 cups 8 to 16 oz a day or less.  - Avoid dietary trigger. (aged cheese, peanuts, MSG, aspartame and nitrates).  - Patient " is to have regular frequent meals to prevent headache onset.    - Please drink at least 64 ounces of water a day to help remain hydrated.    Please call with any questions or concerns. Office number is [phone]

## 2024-06-14 NOTE — ASSESSMENT & PLAN NOTE
Preventative:  Continue medications per other providers  Cyclobenzaprine 10 mg at bedtime  Botox every 3 months     Abortive:  At onset of migraine take Nurtec 75 mg.  Limit of 1 in 24 hours along with ibuprofen 800 mg.  May use rizatriptan if this fails.  May repeat in 2 hours if needed.  Less than 3 doses a week or 9 a month.  May use an extra dose of cyclobenzaprine 10 mg as needed but only 10 times in a month maximum

## 2024-07-18 DIAGNOSIS — Z00.6 ENCOUNTER FOR EXAMINATION FOR NORMAL COMPARISON OR CONTROL IN CLINICAL RESEARCH PROGRAM: ICD-10-CM

## 2024-07-29 DIAGNOSIS — M54.2 CERVICALGIA: ICD-10-CM

## 2024-07-29 RX ORDER — CYCLOBENZAPRINE HCL 10 MG
TABLET ORAL
Qty: 40 TABLET | Refills: 11 | Status: SHIPPED | OUTPATIENT
Start: 2024-07-29

## 2024-08-15 ENCOUNTER — PROCEDURE VISIT (OUTPATIENT)
Dept: NEUROLOGY | Facility: CLINIC | Age: 29
End: 2024-08-15
Payer: MEDICARE

## 2024-08-15 ENCOUNTER — TELEPHONE (OUTPATIENT)
Dept: NEUROLOGY | Facility: CLINIC | Age: 29
End: 2024-08-15

## 2024-08-15 VITALS — SYSTOLIC BLOOD PRESSURE: 126 MMHG | HEART RATE: 79 BPM | DIASTOLIC BLOOD PRESSURE: 65 MMHG | TEMPERATURE: 98.9 F

## 2024-08-15 DIAGNOSIS — G43.709 CHRONIC MIGRAINE WITHOUT AURA WITHOUT STATUS MIGRAINOSUS, NOT INTRACTABLE: Primary | ICD-10-CM

## 2024-08-15 PROCEDURE — 64615 CHEMODENERV MUSC MIGRAINE: CPT | Performed by: PHYSICIAN ASSISTANT

## 2024-08-15 NOTE — PROGRESS NOTES
"Universal Protocol   procedure performed by consultantConsent: Verbal consent obtained. Written consent obtained.  Risks and benefits: risks, benefits and alternatives were discussed  Consent given by: patient  Time out: Immediately prior to procedure a \"time out\" was called to verify the correct patient, procedure, equipment, support staff and site/side marked as required.  Patient understanding: patient states understanding of the procedure being performed  Patient consent: the patient's understanding of the procedure matches consent given  Procedure consent: procedure consent matches procedure scheduled  Relevant documents: relevant documents present and verified  Patient identity confirmed: verbally with patient      Chemodenervation     Date/Time  8/15/2024 2:30 PM     Performed by  Katy Voss PA-C   Authorized by  Katy Voss PA-C     Pre-procedure details      Prepped With: Alcohol     Anesthesia  (see MAR for exact dosages):     Anesthesia method:  None   Procedure details      Position:  Upright   Botox      Botox Type:  Type A    Brand:  Botox    mL's of Botulinum Toxin:  200    Final Concentration per CC:  50 units    Needle Gauge:  30 G 2.5 inch   Procedures      Botox Procedures: chronic headache      Indications: migraines     Injection Location      Head / Face:  L superior cervical paraspinal, R superior cervical paraspinal, L , R , L frontalis, R frontalis, L medial occipitalis, R medial occipitalis, procerus, R temporalis, L temporalis, R superior trapezius and L superior trapezius    L  injection amount:  5 unit(s)    R  injection amount:  5 unit(s)    L lateral frontalis:  5 unit(s)    R lateral frontalis:  5 unit(s)    L medial frontalis:  5 unit(s)    R medial frontalis:  5 unit(s)    L temporalis injection amount:  20 unit(s)    R temporalis injection amount:  20 unit(s)    Procerus injection amount:  5 unit(s)    L orbicularis oculi injection " amount:  5 unit(s)    L medial occipitalis injection amount:  15 unit(s)    R medial occipitalis injection amount:  15 unit(s)    L superior cervical paraspinal injection amount:  10 unit(s)    R superior cervical paraspinal injection amount:  10 unit(s)    L superior trapezius injection amount:  15 unit(s)    R superior trapezius injection amount:  15 unit(s)   Total Units      Total units used:  200    Total units discarded:  0   Post-procedure details      Chemodenervation:  Chronic migraine    Facial Nerve Location::  Bilateral facial nerve    Patient tolerance of procedure:  Tolerated well, no immediate complications   Comments        5 units SCM bilaterally  5 units splenius bilaterally  15 units cervical paraspinalis  10 units trapezius  All medically necessary

## 2024-08-15 NOTE — TELEPHONE ENCOUNTER
Botox number of units: 200 units  Botox quantity: 1  Arrived at what location: [place]  Botox at Correct Administering Location: Yes  NDC number: 0341-1912-93  Lot number: [id number]  Expiration Date: 11/01/2026  Appt notes indicate correct medication: Yes

## 2024-09-16 ENCOUNTER — APPOINTMENT (OUTPATIENT)
Dept: LAB | Facility: CLINIC | Age: 29
End: 2024-09-16

## 2024-09-16 DIAGNOSIS — Z00.6 ENCOUNTER FOR EXAMINATION FOR NORMAL COMPARISON OR CONTROL IN CLINICAL RESEARCH PROGRAM: ICD-10-CM

## 2024-09-16 PROCEDURE — 36415 COLL VENOUS BLD VENIPUNCTURE: CPT

## 2024-09-28 LAB
APOB+LDLR+PCSK9 GENE MUT ANL BLD/T: NOT DETECTED
BRCA1+BRCA2 DEL+DUP + FULL MUT ANL BLD/T: NOT DETECTED
MLH1+MSH2+MSH6+PMS2 GN DEL+DUP+FUL M: NOT DETECTED

## 2024-11-18 ENCOUNTER — PROCEDURE VISIT (OUTPATIENT)
Dept: NEUROLOGY | Facility: CLINIC | Age: 29
End: 2024-11-18
Payer: MEDICARE

## 2024-11-18 VITALS — TEMPERATURE: 98.3 F | SYSTOLIC BLOOD PRESSURE: 104 MMHG | HEART RATE: 92 BPM | DIASTOLIC BLOOD PRESSURE: 63 MMHG

## 2024-11-18 DIAGNOSIS — G43.709 CHRONIC MIGRAINE WITHOUT AURA WITHOUT STATUS MIGRAINOSUS, NOT INTRACTABLE: Primary | ICD-10-CM

## 2024-11-18 PROCEDURE — 64615 CHEMODENERV MUSC MIGRAINE: CPT | Performed by: PHYSICIAN ASSISTANT

## 2024-11-18 NOTE — PROGRESS NOTES
"Universal Protocol   procedure performed by consultantConsent: Verbal consent obtained. Written consent obtained.  Risks and benefits: risks, benefits and alternatives were discussed  Consent given by: patient  Time out: Immediately prior to procedure a \"time out\" was called to verify the correct patient, procedure, equipment, support staff and site/side marked as required.  Patient understanding: patient states understanding of the procedure being performed  Patient consent: the patient's understanding of the procedure matches consent given  Procedure consent: procedure consent matches procedure scheduled  Relevant documents: relevant documents present and verified  Patient identity confirmed: verbally with patient      Chemodenervation     Date/Time  11/18/2024 3:00 PM     Performed by  Katy Voss PA-C   Authorized by  Katy Voss PA-C     Pre-procedure details      Prepped With: Alcohol     Anesthesia  (see MAR for exact dosages):     Anesthesia method:  None   Procedure details      Position:  Upright   Botox      Botox Type:  Type A    Brand:  Botox    mL's of Botulinum Toxin:  200    Final Concentration per CC:  50 units    Needle Gauge:  30 G 2.5 inch   Procedures      Botox Procedures: chronic headache      Indications: migraines     Injection Location      Head / Face:  L superior cervical paraspinal, R superior cervical paraspinal, L , R , L frontalis, R frontalis, L medial occipitalis, R medial occipitalis, procerus, R temporalis, L temporalis, R superior trapezius and L superior trapezius    L  injection amount:  5 unit(s)    R  injection amount:  5 unit(s)    L lateral frontalis:  5 unit(s)    R lateral frontalis:  5 unit(s)    L medial frontalis:  5 unit(s)    R medial frontalis:  5 unit(s)    L temporalis injection amount:  20 unit(s)    R temporalis injection amount:  20 unit(s)    Procerus injection amount:  5 unit(s)    L medial occipitalis injection " amount:  15 unit(s)    R medial occipitalis injection amount:  15 unit(s)    L superior cervical paraspinal injection amount:  10 unit(s)    R superior cervical paraspinal injection amount:  10 unit(s)    L superior trapezius injection amount:  15 unit(s)    R superior trapezius injection amount:  15 unit(s)   Total Units      Total units used:  200    Total units discarded:  0   Post-procedure details      Chemodenervation:  Chronic migraine    Facial Nerve Location::  Bilateral facial nerve    Patient tolerance of procedure:  Tolerated well, no immediate complications   Comments       10 units SCM bilaterally  25 units cervical paraspinal  All medically necessary

## 2024-12-26 ENCOUNTER — NURSE TRIAGE (OUTPATIENT)
Age: 29
End: 2024-12-26

## 2024-12-26 NOTE — TELEPHONE ENCOUNTER
"Reason for Disposition  • Patient wants to be seen    Answer Assessment - Initial Assessment Questions  1. DESCRIPTION: \"Please describe your heart rate or heartbeat that you are having\" (e.g., fast/slow, regular/irregular, skipped or extra beats, \"palpitations\")      Palpation/irregular- episodes was last night   Not feeling it until she breathing/panic attack  Heart pounding,   2. ONSET: \"When did it start?\" (e.g., minutes, hours, days)       Started April   3. DURATION: \"How long does it last\" (e.g., seconds, minutes, hours)      Few seconds  4. PATTERN \"Does it come and go, or has it been constant since it started?\"  \"Does it get worse with exertion?\"   \"Are you feeling it now?\"      Comes and goes, no pattern  Mostly laying down, or sitting     5. TAP: \"Using your hand, can you tap out what you are feeling on a chair or table in front of you, so that I can hear?\" Note: Not all patients can do this.        Unable   6. HEART RATE: \"Can you tell me your heart rate?\" \"How many beats in 15 seconds?\"  Note: Not all patients can do this.        Unreal   7. RECURRENT SYMPTOM: \"Have you ever had this before?\" If Yes, ask: \"When was the last time?\" and \"What happened that time?\"       denies  8. CAUSE: \"What do you think is causing the palpitations?\"      unsure  9. CARDIAC HISTORY: \"Do you have any history of heart disease?\" (e.g., heart attack, angina, bypass surgery, angioplasty, arrhythmia)         10. OTHER SYMPTOMS: \"Do you have any other symptoms?\" (e.g., dizziness, chest pain, sweating, difficulty breathing)        Denies   Gets blurry vision when episodes occurred    Advised appt has been scheduled, and advised if able to check BP/HR when she having an episode would be great, if not recommend ED for evaluation   In the meantime I would also recommend following up with her PCP to discuss if they want her to be seen sooner or do testing. Pt verbally understood    Protocols used: Heart Rate and Heartbeat " Questions-Adult-OH

## 2024-12-29 ENCOUNTER — HOSPITAL ENCOUNTER (EMERGENCY)
Facility: HOSPITAL | Age: 29
Discharge: HOME | End: 2024-12-29
Attending: EMERGENCY MEDICINE
Payer: MEDICARE

## 2024-12-29 ENCOUNTER — TELEPHONE (OUTPATIENT)
Dept: OTHER | Facility: OTHER | Age: 29
End: 2024-12-29

## 2024-12-29 ENCOUNTER — APPOINTMENT (EMERGENCY)
Dept: CT IMAGING | Facility: HOSPITAL | Age: 29
End: 2024-12-29
Payer: MEDICARE

## 2024-12-29 VITALS
HEART RATE: 82 BPM | OXYGEN SATURATION: 98 % | TEMPERATURE: 98 F | RESPIRATION RATE: 18 BRPM | SYSTOLIC BLOOD PRESSURE: 115 MMHG | DIASTOLIC BLOOD PRESSURE: 76 MMHG

## 2024-12-29 DIAGNOSIS — R20.2 PARESTHESIAS: Primary | ICD-10-CM

## 2024-12-29 DIAGNOSIS — R00.2 PALPITATIONS: ICD-10-CM

## 2024-12-29 LAB
ATRIAL RATE: 79 BPM
MAGNESIUM SERPL-MCNC: 2.1 MG/DL (ref 1.9–2.7)
P AXIS: 75 DEGREES
PR INTERVAL: 156 MS
QRS AXIS: 92 DEGREES
QRSD INTERVAL: 88 MS
QT INTERVAL: 376 MS
QTC INTERVAL: 431 MS
T WAVE AXIS: 72 DEGREES
TSH SERPL DL<=0.05 MIU/L-ACNC: 2.17 UIU/ML (ref 0.45–4.5)
VENTRICULAR RATE: 79 BPM

## 2024-12-29 PROCEDURE — 70450 CT HEAD/BRAIN W/O DYE: CPT

## 2024-12-29 PROCEDURE — 93005 ELECTROCARDIOGRAM TRACING: CPT

## 2024-12-29 PROCEDURE — 84443 ASSAY THYROID STIM HORMONE: CPT

## 2024-12-29 PROCEDURE — 83735 ASSAY OF MAGNESIUM: CPT

## 2024-12-29 PROCEDURE — 99285 EMERGENCY DEPT VISIT HI MDM: CPT | Performed by: EMERGENCY MEDICINE

## 2024-12-29 PROCEDURE — 36415 COLL VENOUS BLD VENIPUNCTURE: CPT

## 2024-12-29 PROCEDURE — 99285 EMERGENCY DEPT VISIT HI MDM: CPT

## 2024-12-29 NOTE — ED ATTENDING ATTESTATION
12/29/2024  I, Jere Foster MD, saw and evaluated the patient. I have discussed the patient with the resident/non-physician practitioner and agree with the resident's/non-physician practitioner's findings, Plan of Care, and MDM as documented in the resident's/non-physician practitioner's note, except where noted. All available labs and Radiology studies were reviewed.  I was present for key portions of any procedure(s) performed by the resident/non-physician practitioner and I was immediately available to provide assistance.       At this point I agree with the current assessment done in the Emergency Department.  I have conducted an independent evaluation of this patient a history and physical is as follows: Patient is a 29 year old female with chest pain and palpitations, numbness, brain fog, mild headache. No fever. No N/V. No travel. Was last seen at [facility] ED on 12/28/24 for chest pain and had EKG, labs, CXR which were unremarkable. Had negative HCG, normal troponins and negative d-dimer. [facility] website checked on this patient and last Rx filled was on 12/22/24 for clonazepam for 30 day supply. Patient worried about TIA/CVA since grandmother had TIAs at age 50. NCAT. PERRL. No scleral icterus. Moist mucous membranes. Lungs clear. Heart regular without murmur. Abdomen soft and nontender. Good bowel sounds. No edema. No rash noted. No focal deficits. DDx including but not limited to: Doubt metabolic abnormality, cardiac arrhythmia, ACS, MI;  thyroid disease, doubt PE since negative D-dimer at [facility] ED; anxiety, adverse reaction. Doubt CVA or TIA or other intracranial process. Will check labs and CT Head and EKG.     ED Course         Critical Care Time  Procedures

## 2024-12-29 NOTE — DISCHARGE INSTRUCTIONS
Please follow-up with your primary care for today's visit.  Please return to the emergency department if you develop any new or worsening symptoms that concern you.

## 2024-12-29 NOTE — TELEPHONE ENCOUNTER
New appointment on 02/20/25. Seen in ED on 12/28 for Palpitation. Calling to see if any sooner appointments are available or to be placed on the wait list for any cancellation. Callback requested.

## 2024-12-29 NOTE — ED PROVIDER NOTES
"Time reflects when diagnosis was documented in both MDM as applicable and the Disposition within this note       Time User Action Codes Description Comment    12/29/2024  6:47 AM Cara Schwab [R20.2] Paresthesias     12/29/2024  6:47 AM Cara Schwab [R00.2] Palpitations           ED Disposition       ED Disposition   Discharge    Condition   Stable    Date/Time   Sun Dec 29, 2024  6:47 AM    Comment   Florencia Schilling discharge to home/self care.                   Assessment & Plan       Medical Decision Making  Amount and/or Complexity of Data Reviewed  Labs: ordered.  Radiology: ordered.      Florencia Schilling is a female past medical history of anxiety presenting to the ED for generalized weakness and palpitations.  Differential includes electrolyte abnormality, hypothyroidism and acute intracranial abnormality.    CBC from [facility] showed no anemia or leukocytosis.  CMP was unremarkable for a low potassium, sodium.  Magnesium and TSH within normal limits in ED.    Low concern for stroke considering neurological exam does not follow any expected pattern.  Head CT with contrast was still obtained which showed no acute abnormalities.    Instructed patient to follow-up with primary care doctor.  Patient could also see her neurologist for further workup.    Dispo: discharge to home  Prescriptions: None    Discussed results and plan with patient. Patient was agreeable and expressed understanding. Discussed return precautions.             Medications - No data to display    ED Risk Strat Scores          History of Present Illness       Chief Complaint   Patient presents with    Numbness     Pt seen at [facility] approx 2 hrs ago for same issues. States was at a hockey game and began with B/L arm weakness / numbness, fatigue, and \"out of it\". No neuro deficits in triage at this time. Pt requesting \"MRI scan of the brain\".       Past Medical History:   Diagnosis Date    Anxiety     Concussion 6/14/2017    Depression  "    GERD (gastroesophageal reflux disease)     Head injury     Headache, tension-type 9yrs old    Migraines    Irritable bowel syndrome     Multiple gastric ulcers     Psoriasis       Past Surgical History:   Procedure Laterality Date    COLONOSCOPY        Family History   Problem Relation Age of Onset    Bipolar disorder Father     Alcohol abuse Father     Multiple sclerosis Father     Bipolar disorder Brother     Migraines Maternal Grandmother         Migraines/virago    Neuropathy Maternal Grandmother     Stroke Maternal Grandmother     Aneurysm Paternal Grandfather       Social History     Tobacco Use    Smoking status: Every Day     Current packs/day: 0.00     Types: Cigarettes     Last attempt to quit: 2018     Years since quittin.1    Smokeless tobacco: Never    Tobacco comments:     Quit smoking cigarettes in 2018, started vaping since then.   Vaping Use    Vaping status: Every Day    Start date: 2019    Substances: Nicotine, Flavoring   Substance Use Topics    Alcohol use: Yes     Comment: Drink socially    Drug use: Yes     Types: Marijuana      E-Cigarette/Vaping    E-Cigarette Use Current Every Day User     Start Date 19     Cartridges/Day 1 week        E-Cigarette/Vaping Substances    Nicotine Yes     THC No     CBD No     Flavoring Yes     Other No     Unknown No       I have reviewed and agree with the history as documented.     PIERCE Schilling is a female past medical history of anxiety presenting to the ED for generalized weakness and palpitations.  Patient states that around 8 PM today while at a hockey game, patient began to have racing heart rate followed by generalized weakness, numbness over whole face, left arm, right abdomen and bilateral legs, mental fog and sensation of warmth throughout whole body.  He states that she has had these episodes for the last 2 years.  Normally, she has these episodes once a month, but over the last 2 months she has had 6 episodes.   Episodes normally last 30 minutes, but episode today lasted 2 hours.  Initially, she went [facility] for evaluation.  Basic labs were done there without any abnormalities and she was sent home from discharge.  Patient felt frustrated and came to [place] ED for reevaluation.  Currently during evaluation, patient still complaining of numbness throughout the body and generalized weakness, but no palpitations.  Patient denies any headache, chest pain, shortness of breath, abdominal pain, recent fevers or illness.    Review of Systems   Constitutional:  Positive for fatigue. Negative for chills and fever.   HENT:  Negative for congestion and rhinorrhea.    Eyes:  Negative for photophobia and visual disturbance.   Respiratory:  Negative for cough and shortness of breath.    Cardiovascular:  Negative for chest pain and palpitations.   Gastrointestinal:  Negative for abdominal pain, nausea and vomiting.   Genitourinary:  Negative for dysuria and hematuria.   Musculoskeletal:  Negative for back pain and neck pain.   Neurological:  Positive for weakness and numbness. Negative for dizziness, light-headedness and headaches.           Objective       ED Triage Vitals [12/29/24 0307]   Temperature Pulse Blood Pressure Respirations SpO2 Patient Position - Orthostatic VS   98 °F (36.7 °C) 92 109/63 18 98 % Lying      Temp Source Heart Rate Source BP Location FiO2 (%) Pain Score    Oral Monitor Right arm -- --      Vitals      Date and Time Temp Pulse SpO2 Resp BP Pain Score FACES Pain Rating User   12/29/24 0445 -- 82 98 % 18 115/76 -- -- JA   12/29/24 0307 98 °F (36.7 °C) 92 98 % 18 109/63 -- -- AW            Physical Exam  Constitutional:       General: She is not in acute distress.     Appearance: Normal appearance. She is normal weight.   HENT:      Head: Normocephalic and atraumatic.      Mouth/Throat:      Mouth: Mucous membranes are moist.      Pharynx: Oropharynx is clear.   Eyes:      Extraocular Movements: Extraocular  movements intact.      Conjunctiva/sclera: Conjunctivae normal.      Pupils: Pupils are equal, round, and reactive to light.   Cardiovascular:      Rate and Rhythm: Normal rate and regular rhythm.      Heart sounds: Normal heart sounds.   Pulmonary:      Effort: Pulmonary effort is normal.      Breath sounds: Normal breath sounds.   Abdominal:      Palpations: Abdomen is soft.      Tenderness: There is no abdominal tenderness.   Musculoskeletal:         General: No swelling or deformity. Normal range of motion.      Cervical back: Normal range of motion. No rigidity.      Right lower leg: No edema.      Left lower leg: No edema.   Skin:     General: Skin is warm and dry.   Neurological:      Mental Status: She is alert and oriented to person, place, and time.      Gait: Gait normal.      Comments: Patient with upper and lower extremity weakness.  Patient able to ambulate without support.  Patient states she has dullness over left side of face, left upper extremity, right chest and abdomen, left and right lower extremity.  Patient with normal coordination.  Patient with normal deep tendon reflexes.         Results Reviewed       Procedure Component Value Units Date/Time    TSH, 3rd generation with Free T4 reflex [[id number]]  (Normal) Collected: 12/29/24 0439    Lab Status: Final result Specimen: Blood from Arm, Left Updated: 12/29/24 0538     TSH 3RD GENERATON 2.168 uIU/mL     Magnesium [[id number]]  (Normal) Collected: 12/29/24 0439    Lab Status: Final result Specimen: Blood from Arm, Left Updated: 12/29/24 0520     Magnesium 2.1 mg/dL             CT head without contrast   ED Interpretation by Cara Schwab MD (12/29 0646)   FINDINGS:     PARENCHYMA:No intracranial mass, mass effect or midline shift. No CT signs of acute infarction.  No acute parenchymal hemorrhage. 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     VENTRICLES AND EXTRA-AXIAL SPACES:  Normal for the  patient's age.     VISUALIZED ORBITS:  Normal visualized orbits.     PARANASAL SINUSES:  Normal visualized paranasal sinuses.     CALVARIUM AND EXTRACRANIAL SOFT TISSUES:  Normal.     IMPRESSION:     Normal head CT. No change from priors.                 Workstation performed: [id number]        Final Interpretation by Davie Lancaster MD (12/29 0624)      Normal head CT. No change from priors.                  Workstation performed: [id number]             ECG 12 Lead Documentation Only    Date/Time: 12/29/2024 4:50 AM    Performed by: Cara Schwab MD  Authorized by: Cara Schwab MD    Indications / Diagnosis:  Weakness  ECG reviewed by me, the ED Provider: yes    Patient location:  ED  Previous ECG:     Previous ECG:  Compared to current    Similarity:  No change    Comparison to cardiac monitor: Yes    Interpretation:     Interpretation: normal    Rate:     ECG rate:  79  Rhythm:     Rhythm: sinus rhythm    QRS:     QRS axis:  Normal    QRS intervals:  Normal  Conduction:     Conduction: normal    ST segments:     ST segments:  Normal  T waves:     T waves: normal        ED Medication and Procedure Management   Prior to Admission Medications   Prescriptions Last Dose Informant Patient Reported? Taking?   ASHWAGANDHA PO   Yes No   Sig: Take 1,300 mg by mouth   Botox 200 units SOLR   No No   Sig: INJECT UP  UNITS INTRAMUSCULARLY INTO VARIOUS SITES OF THE HEAD AND NECK ONCE EVERY THREE MONTHS   CIMZIA PREFILLED 2 X 200 MG/ML  Self Yes No   Patient not taking: Reported on 6/14/2024   Certolizumab Pegol (CIMZIA SC)  Self Yes No   Sig: Inject under the skin 2 syringes every 4 weeks   Patient not taking: Reported on 6/14/2024   Cosentyx Sensoready, 300 MG, 150 MG/ML SOAJ injection   Yes No   Sig: Inject 300 mg under the skin every 30 (thirty) days   HYDROcodone-acetaminophen (NORCO) 5-325 mg per tablet  Self Yes No   Patient not taking: Reported on 6/14/2024   Magnesium Glycinate (Magnesium Glycinate Advanced) 120  MG CAPS   No No   Sig: Take 480 mg by mouth daily after breakfast for 14 days, THEN 600 mg daily after breakfast for 14 days, THEN 720 mg daily after breakfast for 14 days, THEN 840 mg daily after breakfast.   Olive Leaf Extract 150 MG CAPS   Yes No   Sig: Take 150 mg by mouth in the morning   TRINTELLIX 10 MG tablet  Self Yes No   Sig: Take 10 mg by mouth daily   albuterol (PROVENTIL HFA,VENTOLIN HFA) 90 mcg/act inhaler  Self Yes No   Sig: Inhale 1 puff every 4 (four) hours as needed for shortness of breath   Patient not taking: Reported on 6/14/2024   albuterol (ProAir HFA) 90 mcg/act inhaler   No No   Sig: Inhale 2 puffs every 6 (six) hours as needed for wheezing or shortness of breath   baclofen 10 mg tablet   No No   Sig: TAKE 1 TABLET BY MOUTH THREE TIMES A DAY   clobetasol (TEMOVATE) 0.05 % ointment   Yes No   Sig: Apply 1 application. topically if needed To affected area   clonazePAM (KlonoPIN) 0.5 mg tablet  Self Yes No   Sig: Take 0.5 mg by mouth 2 (two) times a day as needed   cyclobenzaprine (FLEXERIL) 10 mg tablet   No No   Sig: TAKE 1 TAB AT BEDTIME NIGHTLY AND MAY USE A SECOND DOSE AS NEEDED BUT LESS THAN 10 TIMES A MONTH   ibuprofen (MOTRIN) 800 mg tablet   No No   Sig: Take 1 tablet (800 mg total) by mouth as needed for mild pain (less then 3 times a week)   levonorgestrel (Kyleena) 19.5 MG intrauterine device   Yes No   Sig: by Intrauterine route   omeprazole (PriLOSEC) 20 mg delayed release capsule   Yes No   Sig: TAKE 1 CAPSULE BY MOUTH BEFORE BREAKFAST   Patient not taking: Reported on 6/28/2023   oxyCODONE-acetaminophen (PERCOCET) 5-325 mg per tablet  Self Yes No   Sig: Take 0.25 tablets by mouth as needed for moderate pain   Patient not taking: Reported on 6/14/2024   polyethylene glycol (MiraLax) 17 GM/SCOOP powder   Yes No   Sig: Take 17 g by mouth if needed   rimegepant sulfate (Nurtec) 75 mg TBDP   No No   Sig: Take 1 tablet (75 mg total) by mouth as needed (migraine) Limit of 1 in 24  hours   rizatriptan (MAXALT) 10 mg tablet   No No   Sig: TAKE 1 TABLET (10 MG TOTAL) BY MOUTH ONCE AS NEEDED FOR MIGRAINE MAY REPEAT IN 2 HOURS IF NEEDED. MAX 2/24 HOURS, 9/MONTH.   tacrolimus (PROTOPIC) 0.03 % ointment   No No   Sig: Apply topically 2 (two) times a day For up to 6 wk.   Patient not taking: Reported on 6/14/2024   tiZANidine (ZANAFLEX) 4 mg tablet  Self No No   Sig: Take 1 tablet (4 mg total) by mouth daily at bedtime   Patient not taking: Reported on 6/28/2023   zolpidem (AMBIEN) 10 mg tablet  Self Yes No   Sig: Take 10 mg by mouth daily at bedtime as needed for sleep   Patient not taking: Reported on 6/14/2024      Facility-Administered Medications: None     Discharge Medication List as of 12/29/2024  6:48 AM        CONTINUE these medications which have NOT CHANGED    Details   !! albuterol (ProAir HFA) 90 mcg/act inhaler Inhale 2 puffs every 6 (six) hours as needed for wheezing or shortness of breath, Starting Sun 2/18/2024, Normal      !! albuterol (PROVENTIL HFA,VENTOLIN HFA) 90 mcg/act inhaler Inhale 1 puff every 4 (four) hours as needed for shortness of breath, Starting Sun 8/18/2019, Historical Med      ASHWAGANDHA PO Take 1,300 mg by mouth, Historical Med      baclofen 10 mg tablet TAKE 1 TABLET BY MOUTH THREE TIMES A DAY, Starting Mon 6/10/2024, Normal      Botox 200 units SOLR INJECT UP  UNITS INTRAMUSCULARLY INTO VARIOUS SITES OF THE HEAD AND NECK ONCE EVERY THREE MONTHS, Normal      Certolizumab Pegol (CIMZIA SC) Inject under the skin 2 syringes every 4 weeks, Historical Med      CIMZIA PREFILLED 2 X 200 MG/ML Starting Sat 2/22/2020, Historical Med      clobetasol (TEMOVATE) 0.05 % ointment Apply 1 application. topically if needed To affected area, Starting Mon 1/16/2023, Historical Med      clonazePAM (KlonoPIN) 0.5 mg tablet Take 0.5 mg by mouth 2 (two) times a day as needed, Historical Med      Cosentyx Sensoready, 300 MG, 150 MG/ML SOAJ injection Inject 300 mg under the skin  every 30 (thirty) days, Starting Fri 3/17/2023, Historical Med      cyclobenzaprine (FLEXERIL) 10 mg tablet TAKE 1 TAB AT BEDTIME NIGHTLY AND MAY USE A SECOND DOSE AS NEEDED BUT LESS THAN 10 TIMES A MONTH, Normal      HYDROcodone-acetaminophen (NORCO) 5-325 mg per tablet Starting Sat 5/9/2020, Historical Med      ibuprofen (MOTRIN) 800 mg tablet Take 1 tablet (800 mg total) by mouth as needed for mild pain (less then 3 times a week), Starting Fri 6/14/2024, Normal      levonorgestrel (Kyleena) 19.5 MG intrauterine device by Intrauterine route, Historical Med      Magnesium Glycinate (Magnesium Glycinate Advanced) 120 MG CAPS Multiple Dosages:Starting Mon 11/18/2024, Until Sun 12/1/2024 at 2359, THEN Starting Mon 12/2/2024, Until Sun 12/15/2024 at 2359, THEN Starting Mon 12/16/2024, Until Sun 12/29/2024 at 2359, THEN Starting Mon 12/30/2024, Until Mon 12/29/2025 at 2359T sharri 480 mg by mouth daily after breakfast for 14 days, THEN 600 mg daily after breakfast for 14 days, THEN 720 mg daily after breakfast for 14 days, THEN 840 mg daily after breakfast., Normal      Olive Leaf Extract 150 MG CAPS Take 150 mg by mouth in the morning, Historical Med      omeprazole (PriLOSEC) 20 mg delayed release capsule TAKE 1 CAPSULE BY MOUTH BEFORE BREAKFAST, Historical Med      oxyCODONE-acetaminophen (PERCOCET) 5-325 mg per tablet Take 0.25 tablets by mouth as needed for moderate pain, Historical Med      polyethylene glycol (MiraLax) 17 GM/SCOOP powder Take 17 g by mouth if needed, Starting Thu 12/22/2022, Historical Med      rimegepant sulfate (Nurtec) 75 mg TBDP Take 1 tablet (75 mg total) by mouth as needed (migraine) Limit of 1 in 24 hours, Starting Wed 6/28/2023, Normal      rizatriptan (MAXALT) 10 mg tablet TAKE 1 TABLET (10 MG TOTAL) BY MOUTH ONCE AS NEEDED FOR MIGRAINE MAY REPEAT IN 2 HOURS IF NEEDED. MAX 2/24 HOURS, 9/MONTH., Normal      tacrolimus (PROTOPIC) 0.03 % ointment Apply topically 2 (two) times a day For up to 6  wk., Starting Tue 5/16/2023, Normal      tiZANidine (ZANAFLEX) 4 mg tablet Take 1 tablet (4 mg total) by mouth daily at bedtime, Starting Wed 4/22/2020, Normal      TRINTELLIX 10 MG tablet Take 10 mg by mouth daily, Starting Tue 2/18/2020, Historical Med      zolpidem (AMBIEN) 10 mg tablet Take 10 mg by mouth daily at bedtime as needed for sleep, Historical Med       !! - Potential duplicate medications found. Please discuss with provider.        No discharge procedures on file.  ED SEPSIS DOCUMENTATION   Time reflects when diagnosis was documented in both MDM as applicable and the Disposition within this note       Time User Action Codes Description Comment    12/29/2024  6:47 AM Cara Schwab [R20.2] Paresthesias     12/29/2024  6:47 AM Cara Schwab [R00.2] Palpitations                  Cara Schwab MD  12/29/24 0857

## 2024-12-30 ENCOUNTER — TELEPHONE (OUTPATIENT)
Age: 29
End: 2024-12-30

## 2024-12-30 DIAGNOSIS — R56.9 SEIZURE-LIKE ACTIVITY (HCC): Primary | ICD-10-CM

## 2024-12-30 NOTE — TELEPHONE ENCOUNTER
"Pt called to advise she has been experiencing \"seizures\" recently . She was seen in ED this weekend but not by neurology. She confirmed she was never given any seizure medication and  a CT scan was done. She stated she was told by ED staff everything was normal but pt is still worried and is requesting an urgent appt . Pt refused first available appt in February and stated she would like to speak with provider urgently if early appt cannot be accommodated.  "

## 2024-12-30 NOTE — TELEPHONE ENCOUNTER
"Not sure why this was not sent to nursing to triage during the call?    Nursing:  Please call patient to ask about her symptoms and what is going on.  I read her 2 ER notes from [facility] and [facility] with tachycardia, hypokalemia with numbness weakness and sense of being \"out of it.\"  CT scan was stable.  No mention of seizures in either ER note.  What is patient's main concern at this time?  Seizures or stroke-like symptoms?  Or other concerns?  She sees cardiology tomorrow, 12/31/2024.  "

## 2024-12-30 NOTE — TELEPHONE ENCOUNTER
Called pt.  Made her aware of below.    She was at a hockey game for about an hour and then her whole chest got hot and started tightening up.  Heart pounding out of her chest, Slurring speech, vision started getting blurry almost black.  Numbness/tingling/weakness on her left side of her body from her belly button up, Numbness and weakness to the right side of her body from the belly button up. Trouble breathing as well. Loses cognitive skills, wasn't making any sense and loses motor skills as well.    Then EMS was called and she was sent to [facility] ER.      When this occurs she feels her body tense up, everything constricts.     States that this occurred once last year and again on Christmas night and then again on Saturday 12/28.     She went to [facility] ER as she did not feel listened to, she wasn't treated or stabilized by [facility] so she had her friend pick her up form [facility] and take her to  ER.     She Feels like she is having seizure or stroke like symptoms.  Yesterday she was really tired, right side of her head had a headache, generalized weakness, but feels like it is more on her right now.    She still feels the headache, weakness, tired, feels like her speech is delayed.      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  She states that she was never told about this.      She is asking if an MRI can be ordered    She is scheduled to get a holter monitor put on tomorrow, he cardiology appt is not scheduled until 2/20    Please advise  [phone] to leave detailed message

## 2024-12-30 NOTE — TELEPHONE ENCOUNTER
"If she still feels \"She still feels the headache, weakness, tired, feels like her speech is delayed. \" She should return to the ER for an exam and work up.      Not sure why she wasn't told about the cavernoma at the time the MRI was done.  She was seeing Dr Lloyd at that time.  However, this was not visible on CT scan done 12/29/24, but CT only looks for bleeds, large masses or mass effect.  There was no evidence of bleeding on CT    Ordering an MRI would require an office visit, cannot be done with just telephone encounter and would need to be a 60 minute appointment since a new neurologic issue/symptoms.  And, I do not have any availability until 1/23 for visit and then would likely be another 3 weeks before could get MRI as we need that amount of time to get outpatient scans authorized.  So, that would no be until middle to end of February.      I ordered a routine EEG which she can schedule.  This will not eliminate a diagnosis of seizure if negative as it is only a single point in time, similar to an EKG    If she is concerned about stroke then she needs to go back to ER for an urgent workup  "

## 2024-12-31 NOTE — TELEPHONE ENCOUNTER
Called and advised pt of the below. She verbalized understanding. But hesitant to go back to the ED bec [facility]. Her bp was high, oxygen level was low. She was convulsing as she was being transferred from the stretcher to bed.  ed did nothing for her per pt. She was then discharged as they did not see any signs of stroke or seizures. Pt states that since she was still having symptoms, she was dropped off at [facility]. CT was done but she feels that nothing else was done for her.   Pt states that she is still having HA, weakness, tired and motor and speech is delayed. She was having more frequent convulsions. Pt states that she does not really want to go back to the ER. She feels that she is not being heard and not getting the proper care. After convincing the pt and discussed Katy's recommendation below, pt finally agreed. She will go to  ed in [facility]. She will request to see a neurology in the ER. Also advised pt not to drive. Pt verbalized understanding.    She will call  at [phone] to schedule EEG    fyi

## 2025-01-02 NOTE — TELEPHONE ENCOUNTER
Patient called in to notify Provider that she was seen in ED again at [facility] on 12/31/24 for the same ongoing symptoms. She thinks it may be seizure related, but not positive as her body stiffens up for about 5-10 min, she is fully conscious during that time. Pt informed she seen a neurologist at that visit, but no information was provided to her. She was wearing a Holter monitor during that time, that was placed that day. But ED took it off and never put it back on (she is contact cardiology dept at end of our call to see if they can arrange to have it put back on).     Patient informed she just wanted to give provider this update. She informed she did not schedule her EEG yet, but will call to payton the EEG when she hangs up our call. Pt just wants answers, aware once EEG is completed and our provider reveiws those results, she will get the update at that time with the results.    Pt informed she just woke up today and does not have those symptoms at this time. However, she is aware if her symptoms continue or worsen she should call our office or go to the nearest ED.     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 I did advise pt at this time I would advise not to drive until she hears back from our office provider with advisement. Pt understood and will wait for a call back.    Routed to provider to advise as pt requesting a call back at , may leave a detailed msg.

## 2025-01-02 NOTE — TELEPHONE ENCOUNTER
First seizure was in jan 2024 and pt thought it was a panic attack at first. Then they continued to occur. Pt stated that in Dec 2024 she has had 7 of these episodes and the episodes last approx. 5-10 minutes.     Pt does not faint or go unconscious. Pt states that her body restricts and tenses up and does not have the ability to move.     Pt stated that her warning signs are heart pounding chest tightness and a influct of heavy breathing and tingling on right side of head. And after then she has a headache with confussion trouble speaking and disorientation.     The only life style changes she has made are taking fish oil and magnesium.     Warm transfer to [facility]

## 2025-01-03 NOTE — TELEPHONE ENCOUNTER
Since she is stating these are seizures, she may not drive until further evaluation.  A form will be submitted to [employer] for [employer] to assess her ability to drive.  IN addition she should have someone record the events for her and she does need to go to the ER if these continue or last longer or more symptoms

## 2025-01-03 NOTE — TELEPHONE ENCOUNTER
She shouldn't drive until she has her EEG.  As we discussed this is just a 1 time moment in time and if concern for seziure we will need to do additional testing

## 2025-01-03 NOTE — TELEPHONE ENCOUNTER
Called pt and made her aware of below.   She states that she had another   one again last night, states that it was short only a few minutes and recovered quickly.      EEG is scheduled for 1/16.

## 2025-01-06 NOTE — TELEPHONE ENCOUNTER
Called and left a detailed message on pt's answering machine  of all of the below and for a call back if any questions.    MA, pls fill out [employer] form    thanks

## 2025-01-06 NOTE — TELEPHONE ENCOUNTER
Spoke w/pt. Advised her of note below. Answered pt's questions re: [employer] process, EEG process.     Pt currently scheduled for EMG on 1/16/24. States she recd a call from CS w/ability to schedule a sooner appt. Called CS w/pt on the line. Spoke w/Tracey. Pt's appt was moved to Tuesday, 1/7/25 @ 12:30pm.   Will need to obtain urgent auth from [employer] team.     Advised pt we will let her know once EEG is approved.     [phone] - okay to leave detailed msg.     Pre-[employer] Team - please obtain urgent auth for EEG. Thank you!

## 2025-01-07 ENCOUNTER — HOSPITAL ENCOUNTER (OUTPATIENT)
Dept: NEUROLOGY | Facility: CLINIC | Age: 30
Discharge: HOME | End: 2025-01-07
Payer: MEDICARE

## 2025-01-07 DIAGNOSIS — R56.9 SEIZURE-LIKE ACTIVITY (HCC): ICD-10-CM

## 2025-01-07 PROCEDURE — 95816 EEG AWAKE AND DROWSY: CPT

## 2025-01-07 PROCEDURE — 95816 EEG AWAKE AND DROWSY: CPT | Performed by: PSYCHIATRY & NEUROLOGY

## 2025-01-08 ENCOUNTER — RESULTS FOLLOW-UP (OUTPATIENT)
Dept: NEUROLOGY | Facility: CLINIC | Age: 30
End: 2025-01-08

## 2025-01-09 NOTE — TELEPHONE ENCOUNTER
Called and advised pt of all of the below. She verbalized understanding. Pt prefers EMU admission.

## 2025-01-09 NOTE — TELEPHONE ENCOUNTER
Katy Voss PA-C to Neurology Concussion & Migraine Team 5       1/8/25  3:26 PM  Result Note  Please call patient to let her know that her routine EEG did not demonstrate any epileptic activity.  However, as we discussed, this was only recording for 27 minutes so only documented during that time.  If she feels that she is having events and is still concerned of seizures, we can either do a 3-day outpatient EEG or we can do an admission to EMU.  Please see which she would prefer  Katy Voss PA-C to Florencia Schilling         1/8/25  3:26 PM  Result is normal.  The EEG does not show any epileptic events.  However, as we discussed that this was only recorded for 27 minutes, which is a very short period of time.  If you are still concerned, we can proceed with either a 3-day outpatient EEG or we can do an admission to the epilepsy monitoring unit where we can have continuous EEG along with video to determine if these are seizures

## 2025-01-13 ENCOUNTER — TELEPHONE (OUTPATIENT)
Dept: NEUROLOGY | Facility: CLINIC | Age: 30
End: 2025-01-13

## 2025-01-13 DIAGNOSIS — Q28.3 CEREBRAL CAVERNOMA: Primary | ICD-10-CM

## 2025-01-13 NOTE — TELEPHONE ENCOUNTER
Result Note  Please call patient to let her know that her routine EEG did not demonstrate any epileptic activity.  However, as we discussed, this was only recording for 27 minutes so only documented during that time.  If she feels that she is having events and is still concerned of seizures, we can either do a 3-day outpatient EEG or we can do an admission to EMU.  Please see which she would prefer

## 2025-01-14 NOTE — TELEPHONE ENCOUNTER
I do not have the images of the more recent MRI done at [facility] so I can only go off of their report.  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  I can refer her to neurosurgery for an evaluation if she would like but I do not feel there is any further imaging that needs to be done.  And, since it has been consistently the similar size in 4 years it is likely not the cause of her new changes.  However she can defer at this time admission to EMU if she would like

## 2025-01-15 NOTE — TELEPHONE ENCOUNTER
Called and left a detailed message on pt's answering machine of the below (ok per consent) and for a call back

## 2025-01-15 NOTE — TELEPHONE ENCOUNTER
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

## 2025-01-16 NOTE — TELEPHONE ENCOUNTER
Placed referral for neurosurgery.  Have had my team request the images from [facility] but has not been uploaded into PACs as of yet

## 2025-01-17 ENCOUNTER — TELEPHONE (OUTPATIENT)
Age: 30
End: 2025-01-17

## 2025-01-17 NOTE — TELEPHONE ENCOUNTER
01/17/25    Patient called office today informing that she received a letter from [employer] in regard of her License being suspended and she haves to turn it in by 01/21/25.    Patient stated that she UNDERSTANDS the reason why, but what she can't Understand is why the decision was made without her being formally diagnosed with seizures and no evaluation to confirm.    Patient also stated that with letter there is a form that needs to be completed by Neurology.     I advised Patient to have form dropped off or attached to a My-Chart Message for the Viewing of our Clinical Team, and advice if she needs to schedule an appt to complete form or not.     Patient UNDERSTOOD and AGREED.      Any questions, please contact Patient and Please be on the look-out for form.    Thank You.  My Apologies for the Trouble.

## 2025-01-17 NOTE — TELEPHONE ENCOUNTER
Patient dropped off Seizure reporting form, and loss of consciousness and/or awareness form.  Forms in the [place] office, forms scanned into the patients chart..

## 2025-01-17 NOTE — TELEPHONE ENCOUNTER
Explained to patient the reason Seizure Reporting Forms cannot be completed at this time and informed her I would have the clinical team reach out to her, again, regarding next steps. She understood.    Please reach out patient.    Thank you

## 2025-01-21 NOTE — TELEPHONE ENCOUNTER
Patient called in originally requesting to speak to a Manager as the [employer] told her yesterday if she was never diagnosed with seizures, there is a clinical error, per the suspension of her license. She was informed if pt does not have seizures and testing is normal, provider can submit forms back prior to 6 months (this was advised from [employer] per staff that works on processing the seizure forms/license per pt).    Pt informed she had an EEG that was normal. She is willing to do the EMU but wants to speak to provider to discuss as she never was seen in ED for seizures and she is not sure if the way she explained her symptoms were the right way. She requested an appt previously with provider to discuss her symptoms at that time, but was never made an appt. Pt feels she is going back and forth without speaking to provider and getting her recommendation directly .    I did apologize for pts frustration and due to this situation and pt wants to see provider I offered to [facility] pt an appt if she was agreeable if I have something sooner than her upcoming appt in February (botox). Pt was appreciative and agreeable to see Provider .    I scheduled pt for first available 30 min appt on 1/23/25 (Thursday), to discuss with provider plan, recommendations and to advise if she needs to have the EMU, can she have DMV forms filled out sooner, as our office advised her she must wait 6 months, or is that still the case depending on plan if more testing is needed.    Pt agreeable that she no longer needs to speak to a manager, as she will discuss with provider at upcoming appt . Pt was appreciative for being listened to, being [facility] an appt to have this needed discussion.     Routed to provider per above, due to scheduled appt, as well as if pt still needs EMU to be scheduled, provider can update Nurse Navigator at that time. Pt aware if she does not get a call back to [facility] EMU and she was advised that an EMU is needed, pt [facility]

## 2025-01-21 NOTE — TELEPHONE ENCOUNTER
As stated before, this is a new neurologic condition which she has never been seen in the office for so this should be a 60 minute appointment to review, discuss and order additional testing etc.  I have a 60 min opening on 2/5/2025.  30 minutes will not be long enough on Thursday to provide adequate discussion etc.

## 2025-01-22 ENCOUNTER — TELEPHONE (OUTPATIENT)
Dept: NEUROLOGY | Facility: CLINIC | Age: 30
End: 2025-01-22

## 2025-01-22 NOTE — TELEPHONE ENCOUNTER
"Pt called back. Made her aware of Luda N[place]'s message and offered the appointments that were mentioned in her note. She declined the appointments. Pt stated that she was extremely upset and feels like she is being \"thrown around\". Pt does not think that she needs a 60 minute appointment. People are telling her all different things and she is about to get a different neurologist because of how things are being handled. This nurse did apologize for her frustration.  "

## 2025-01-22 NOTE — TELEPHONE ENCOUNTER
Called patient and I left a message to call back to reschedule her 01/23/25 appointment due to needing 60 minutes. If the patient calls back please offer her 02/05/25 at 07:30am or 02/06/25 at 10:00am. If you can not schedule please forward to a member of staff that can,

## 2025-01-24 ENCOUNTER — TELEPHONE (OUTPATIENT)
Dept: NEUROLOGY | Facility: CLINIC | Age: 30
End: 2025-01-24

## 2025-01-24 NOTE — TELEPHONE ENCOUNTER
1/24/25 - Spoke with Dr. Grissom & Katy Voss - It was agreed pt should f/u with Epilepsy Team - See previous encounters      Called pt to offer appt. With Dr. Aden - Sched for   1/31/25 at 1230 in [place] - No answer LMOM    If pt calls back, please contact me, so I can speak with her - Thank you     Pt called back to confirm appt.

## 2025-01-29 LAB
ATRIAL RATE: 79 BPM
P AXIS: 75 DEGREES
PR INTERVAL: 156 MS
QRS AXIS: 92 DEGREES
QRSD INTERVAL: 88 MS
QT INTERVAL: 376 MS
QTC INTERVAL: 431 MS
T WAVE AXIS: 72 DEGREES
VENTRICULAR RATE: 79 BPM

## 2025-01-31 ENCOUNTER — OFFICE VISIT (OUTPATIENT)
Dept: NEUROLOGY | Facility: CLINIC | Age: 30
End: 2025-01-31
Payer: MEDICARE

## 2025-01-31 VITALS
OXYGEN SATURATION: 98 % | HEIGHT: 66 IN | DIASTOLIC BLOOD PRESSURE: 70 MMHG | HEART RATE: 70 BPM | BODY MASS INDEX: 23.63 KG/M2 | SYSTOLIC BLOOD PRESSURE: 110 MMHG | WEIGHT: 147 LBS

## 2025-01-31 DIAGNOSIS — F43.10 PTSD (POST-TRAUMATIC STRESS DISORDER): ICD-10-CM

## 2025-01-31 DIAGNOSIS — R56.9 SEIZURE-LIKE ACTIVITY (HCC): Primary | ICD-10-CM

## 2025-01-31 PROCEDURE — 99417 PROLNG OP E/M EACH 15 MIN: CPT | Performed by: PSYCHIATRY & NEUROLOGY

## 2025-01-31 PROCEDURE — 99215 OFFICE O/P EST HI 40 MIN: CPT | Performed by: PSYCHIATRY & NEUROLOGY

## 2025-01-31 NOTE — PROGRESS NOTES
Review of Systems   Constitutional:  Negative for appetite change, fatigue and fever.   HENT: Negative.  Negative for hearing loss, tinnitus, trouble swallowing and voice change.    Eyes: Negative.  Negative for photophobia, pain and visual disturbance.   Respiratory: Negative.  Negative for shortness of breath.    Cardiovascular: Negative.  Negative for palpitations.   Gastrointestinal: Negative.  Negative for nausea and vomiting.   Endocrine: Negative.  Negative for cold intolerance.   Genitourinary: Negative.  Negative for dysuria, frequency and urgency.   Musculoskeletal:  Negative for back pain, gait problem, myalgias, neck pain and neck stiffness.   Skin: Negative.  Negative for rash.   Allergic/Immunologic: Negative.    Neurological:  Negative for dizziness, tremors, seizures, syncope, facial asymmetry, speech difficulty, weakness, light-headedness, numbness and headaches.   Hematological: Negative.  Does not bruise/bleed easily.   Psychiatric/Behavioral: Negative.  Negative for confusion, hallucinations and sleep disturbance.    All other systems reviewed and are negative.
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
## 2025-01-31 NOTE — PATIENT INSTRUCTIONS
"Talk to your therapist and psychiatry about the probable diagnosis of psychogenic nonepileptic spells (functional neurological disorder).   Consider therapy modalities that directly address trauma (e.g. EMDR).   Do not drive for now. This can be re-assessed at next visit in about 3 months.   Another possible cause of some of your symptoms is \"presyncope\". Cardiology evaluates for heart causes of syncope/presyncope.   Consider Epilepsy Monitoring Unit admission if events worsen or do not improve with time.   Return in about 3 months.   Address headaches with Katy Voss PA-C. Headaches may be one trigger of your events.   "

## 2025-02-11 ENCOUNTER — TELEPHONE (OUTPATIENT)
Dept: NEUROLOGY | Facility: CLINIC | Age: 30
End: 2025-02-11

## 2025-02-11 NOTE — TELEPHONE ENCOUNTER
Left message to see if she can come in at 345 with LIVIA instead of Katy on 2/17 for Botox. Send teams if she is ok with that.

## 2025-02-17 ENCOUNTER — PROCEDURE VISIT (OUTPATIENT)
Dept: NEUROLOGY | Facility: CLINIC | Age: 30
End: 2025-02-17
Payer: MEDICARE

## 2025-02-17 VITALS — DIASTOLIC BLOOD PRESSURE: 71 MMHG | HEART RATE: 95 BPM | TEMPERATURE: 98.9 F | SYSTOLIC BLOOD PRESSURE: 115 MMHG

## 2025-02-17 DIAGNOSIS — G43.709 CHRONIC MIGRAINE WITHOUT AURA WITHOUT STATUS MIGRAINOSUS, NOT INTRACTABLE: Primary | ICD-10-CM

## 2025-02-17 PROCEDURE — 64615 CHEMODENERV MUSC MIGRAINE: CPT | Performed by: PHYSICIAN ASSISTANT

## 2025-02-17 NOTE — PROGRESS NOTES
Universal Protocol   Consent: Verbal consent obtained. Written consent obtained.  Risks and benefits: risks, benefits and alternatives were discussed  Consent given by: patient  Patient understanding: patient states understanding of the procedure being performed  Patient consent: the patient's understanding of the procedure matches consent given  Procedure consent: procedure consent matches procedure scheduled      Chemodenervation     Date/Time  2/17/2025 12:00 PM     Performed by  Saida Spencer PA-C   Authorized by  Saida Spencer PA-C     Pre-procedure details      Prepped With: Alcohol     Procedure details      Position:  Upright   Botox      Botox Type:  Type A    Brand:  Botox    mL's of Botulinum Toxin:  200    Final Concentration per CC:  100 units    Needle Gauge:  30 G 2.5 inch   Procedures      Botox Procedures: chronic headache      Indications: migraines     Injection Location      Head / Face:  L superior trapezius, R superior trapezius, L superior cervical paraspinal, R superior cervical paraspinal, L , R , procerus, L temporalis, R temporalis, R frontalis, L frontalis, R medial occipitalis and L medial occipitalis    L  injection amount:  5 unit(s)    R  injection amount:  5 unit(s)    L lateral frontalis:  5 unit(s)    R lateral frontalis:  5 unit(s)    L medial frontalis:  5 unit(s)    R medial frontalis:  5 unit(s)    L temporalis injection amount:  20 unit(s)    R temporalis injection amount:  20 unit(s)    Procerus injection amount:  5 unit(s)    L medial occipitalis injection amount:  15 unit(s)    R medial occipitalis injection amount:  15 unit(s)    L superior cervical paraspinal injection amount:  10 unit(s)    R superior cervical paraspinal injection amount:  10 unit(s)    L superior trapezius injection amount:  15 unit(s)    R superior trapezius injection amount:  15 unit(s)   Total Units      Total units used:  200    Total units discarded:  0    Post-procedure details      Chemodenervation:  Chronic migraine    Facial Nerve Location::  Bilateral facial nerve    Patient tolerance of procedure:  Tolerated well, no immediate complications   Comments       Extra u all medically necessary-- 10 units SCM bilaterally (20 total), 25 units cervical paraspinals b/l       Blood pressure 115/71, pulse 95, temperature 98.9 °F (37.2 °C), temperature source Temporal.

## 2025-03-18 ENCOUNTER — TELEPHONE (OUTPATIENT)
Dept: NEUROLOGY | Facility: CLINIC | Age: 30
End: 2025-03-18

## 2025-03-18 NOTE — TELEPHONE ENCOUNTER
Botox number of units: 200  Botox quantity: 1  Arrived at what location: [facility]  Botox at Correct Administering Location: yes  NDC number: 8376-7985-90  Lot number: [id number]  Expiration Date: 04/2027  Appt notes indicate correct medication: yes
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
## 2025-03-24 ENCOUNTER — OFFICE VISIT (OUTPATIENT)
Dept: CARDIOLOGY CLINIC | Facility: CLINIC | Age: 30
End: 2025-03-24
Payer: MEDICARE

## 2025-03-24 VITALS
DIASTOLIC BLOOD PRESSURE: 70 MMHG | SYSTOLIC BLOOD PRESSURE: 118 MMHG | OXYGEN SATURATION: 100 % | HEIGHT: 66 IN | HEART RATE: 86 BPM | WEIGHT: 149.2 LBS | BODY MASS INDEX: 23.98 KG/M2

## 2025-03-24 DIAGNOSIS — R07.89 ATYPICAL CHEST PAIN: ICD-10-CM

## 2025-03-24 DIAGNOSIS — I95.1 ORTHOSTATIC HYPOTENSION: Primary | ICD-10-CM

## 2025-03-24 DIAGNOSIS — R00.2 PALPITATIONS: ICD-10-CM

## 2025-03-24 PROCEDURE — 99244 OFF/OP CNSLTJ NEW/EST MOD 40: CPT | Performed by: INTERNAL MEDICINE

## 2025-03-24 NOTE — ASSESSMENT & PLAN NOTE
-  Palpitations with a sudden onset and gradual offset  -  Alberto patch to look for symptom-rhythm correlation  -  Structurally normal heart by echo, 2024

## 2025-03-24 NOTE — ASSESSMENT & PLAN NOTE
-  Prior symptoms consistent with orthostatic hypotension  -  Orthostatic vital signs negative in the office today  -  Heart rate did not accelerate nor did tilt table test suggest POTS  -  Repeat tilt table requested

## 2025-03-27 NOTE — ASSESSMENT & PLAN NOTE
Ms. Schilling is a 30 y.o. female with history that includes migraine, PTSD and mood disorder presenting regarding paroxysmal events occurring since 2023 that have worsened recently. Event semiology does not support epileptic seizure. There are risk factors for nonepileptic spells. An event captured on routine EEG involving some of the typical event features (body tensing, abnormal breathing, fogginess) had not EEG correlate. Her neurological exam is normal. There are no seizure risk factors other than stable the small right frontal cavernoma. Her events have some features in common with her prior panic attacks. Headaches may provoke some events.     We discussed the pathophysiology of epilepsy/seizure and nonepileptic spells. We discussed safety/precautions. We discussed driving in detail. She does not lose awareness, but with some events she is not able to move. I recommend driving cessation for at least 3 months. This can be reassessed in follow up. Nicola CASPER will make the final decision regarding driving and might require a longer period of event freedom. Will consider EMU to characterize if events worsen or do not improve with time. We discussed the probable diagnosis of psychogenic nonepileptic spells and the recommended treatment. Therapy modalities that address her past history of trauma may be beneficial. Some events include symptoms consistent with presyncope and additional evaluation with cardiology may be warranted.

## 2025-04-17 NOTE — TELEPHONE ENCOUNTER
Recd vm 9/26 taken off 9/28    This is pharmacy calling in regards to [facility], [address] 1995; And I'm just calling to see when this patient is next due for their botox injections. If you can, please give me a call back. My number is [phone]. Thank you. father

## 2025-04-21 ENCOUNTER — HOSPITAL ENCOUNTER (OUTPATIENT)
Dept: NON INVASIVE DIAGNOSTICS | Facility: CLINIC | Age: 30
Discharge: HOME | End: 2025-04-21
Payer: MEDICARE

## 2025-04-21 VITALS
OXYGEN SATURATION: 99 % | DIASTOLIC BLOOD PRESSURE: 72 MMHG | WEIGHT: 149 LBS | HEIGHT: 66 IN | SYSTOLIC BLOOD PRESSURE: 110 MMHG | HEART RATE: 85 BPM | BODY MASS INDEX: 23.95 KG/M2

## 2025-04-21 DIAGNOSIS — R07.89 ATYPICAL CHEST PAIN: ICD-10-CM

## 2025-04-21 LAB
CHEST PAIN STATEMENT: NORMAL
MAX DIASTOLIC BP: 84 MMHG
MAX HR PERCENT: 85 %
MAX HR: 164 BPM
MAX PREDICTED HEART RATE: 191 BPM
PROTOCOL NAME: NORMAL
RATE PRESSURE PRODUCT: NORMAL
REASON FOR TERMINATION: NORMAL
SL CV STRESS RECOVERY BP: NORMAL MMHG
SL CV STRESS RECOVERY HR: 96 BPM
SL CV STRESS RECOVERY O2 SAT: 99 %
SL CV STRESS STAGE REACHED: 4
STRESS BASELINE BP: NORMAL MMHG
STRESS BASELINE HR: 85 BPM
STRESS O2 SAT REST: 99 %
STRESS PEAK HR: 164 BPM
STRESS POST ESTIMATED WORKLOAD: 13.4 METS
STRESS POST EXERCISE DUR MIN: 10 MIN
STRESS POST EXERCISE DUR MIN: 10 MIN
STRESS POST EXERCISE DUR SEC: 30 SEC
STRESS POST EXERCISE DUR SEC: 30 SEC
STRESS POST O2 SAT PEAK: 99 %
STRESS POST PEAK BP: 150 MMHG
STRESS POST PEAK HR: 164 BPM
STRESS POST PEAK SYSTOLIC BP: 150 MMHG
TARGET HR FORMULA: NORMAL
TEST INDICATION: NORMAL

## 2025-04-21 PROCEDURE — 93017 CV STRESS TEST TRACING ONLY: CPT

## 2025-04-21 PROCEDURE — 93018 CV STRESS TEST I&R ONLY: CPT | Performed by: INTERNAL MEDICINE

## 2025-04-21 PROCEDURE — 93016 CV STRESS TEST SUPVJ ONLY: CPT | Performed by: INTERNAL MEDICINE

## 2025-04-28 ENCOUNTER — RESULTS FOLLOW-UP (OUTPATIENT)
Dept: CARDIOLOGY CLINIC | Facility: CLINIC | Age: 30
End: 2025-04-28

## 2025-05-19 ENCOUNTER — PROCEDURE VISIT (OUTPATIENT)
Dept: NEUROLOGY | Facility: CLINIC | Age: 30
End: 2025-05-19
Payer: MEDICARE

## 2025-05-19 VITALS — TEMPERATURE: 97.7 F | DIASTOLIC BLOOD PRESSURE: 62 MMHG | SYSTOLIC BLOOD PRESSURE: 100 MMHG | HEART RATE: 80 BPM

## 2025-05-19 DIAGNOSIS — G43.709 CHRONIC MIGRAINE WITHOUT AURA WITHOUT STATUS MIGRAINOSUS, NOT INTRACTABLE: Primary | ICD-10-CM

## 2025-05-19 PROCEDURE — 64615 CHEMODENERV MUSC MIGRAINE: CPT | Performed by: PHYSICIAN ASSISTANT

## 2025-05-19 NOTE — PROGRESS NOTES
"Universal Protocol   procedure performed by consultantConsent: Verbal consent obtained. Written consent obtained  Risks and benefits: risks, benefits and alternatives were discussed  Consent given by: patient  Time out: Immediately prior to procedure a \"time out\" was called to verify the correct patient, procedure, equipment, support staff and site/side marked as required.  Patient understanding: patient states understanding of the procedure being performed  Patient consent: the patient's understanding of the procedure matches consent given  Procedure consent: procedure consent matches procedure scheduled  Relevant documents: relevant documents present and verified  Patient identity confirmed: verbally with patient      Chemodenervation     Date/Time  5/19/2025 3:30 PM     Performed by  Katy Voss PA-C   Authorized by  Katy Voss PA-C     Pre-procedure details      Prepped With: Alcohol     Anesthesia  (see MAR for exact dosages):     Anesthesia method:  None   Procedure details      Position:  Upright   Botox      Botox Type:  Type A    Brand:  Botox    mL's of Botulinum Toxin:  200    Final Concentration per CC:  50 units    Needle Gauge:  30 G 2.5 inch   Procedures      Botox Procedures: chronic headache      Indications: migraines     Injection Location      Head / Face:  L superior cervical paraspinal, R superior cervical paraspinal, L , R , L frontalis, R frontalis, L medial occipitalis, R medial occipitalis, procerus, R temporalis and L temporalis    L  injection amount:  5 unit(s)    R  injection amount:  5 unit(s)    L lateral frontalis:  5 unit(s)    R lateral frontalis:  5 unit(s)    L medial frontalis:  5 unit(s)    R medial frontalis:  5 unit(s)    L temporalis injection amount:  20 unit(s)    R temporalis injection amount:  20 unit(s)    Procerus injection amount:  5 unit(s)    L medial occipitalis injection amount:  15 unit(s)    R medial occipitalis " injection amount:  15 unit(s)    L superior cervical paraspinal injection amount:  10 unit(s)    R superior cervical paraspinal injection amount:  10 unit(s)   Total Units      Total units used:  200    Total units discarded:  0   Post-procedure details      Chemodenervation:  Chronic migraine    Facial Nerve Location::  Bilateral facial nerve    Patient tolerance of procedure:  Tolerated well, no immediate complications   Comments       30 units frontalis and temporalis  45 units occipitalis and suboccipitalis  All medically necessary

## 2025-06-16 ENCOUNTER — TELEPHONE (OUTPATIENT)
Age: 30
End: 2025-06-16

## 2025-06-16 NOTE — TELEPHONE ENCOUNTER
Patient called in to reschedule her botox re certification visit on 6/18/2025 with Katy. Patient states she has a class and will not be able to make the Virtual Visit.    Appointment was rescheduled to first available on 1/30/2026 at 2:30 pm.  However, Patient does have an appointment for BOTOX INJECTION on 8/25/25.    Please advise Patient if other arrangements need to be made in order for her to continue with her Botox treatment.    Thank you.

## 2025-06-17 ENCOUNTER — TELEMEDICINE (OUTPATIENT)
Dept: NEUROLOGY | Facility: CLINIC | Age: 30
End: 2025-06-17
Payer: MEDICARE

## 2025-06-17 ENCOUNTER — TELEPHONE (OUTPATIENT)
Dept: NEUROLOGY | Facility: CLINIC | Age: 30
End: 2025-06-17

## 2025-06-17 DIAGNOSIS — F32.A DEPRESSION: ICD-10-CM

## 2025-06-17 DIAGNOSIS — G43.709 CHRONIC MIGRAINE WITHOUT AURA WITHOUT STATUS MIGRAINOSUS, NOT INTRACTABLE: ICD-10-CM

## 2025-06-17 DIAGNOSIS — G43.E09 CHRONIC MIGRAINE WITH AURA WITHOUT STATUS MIGRAINOSUS, NOT INTRACTABLE: Primary | ICD-10-CM

## 2025-06-17 DIAGNOSIS — R42 VERTIGO: ICD-10-CM

## 2025-06-17 DIAGNOSIS — G43.009 MIGRAINE WITHOUT AURA AND WITHOUT STATUS MIGRAINOSUS, NOT INTRACTABLE: ICD-10-CM

## 2025-06-17 PROCEDURE — 99213 OFFICE O/P EST LOW 20 MIN: CPT | Performed by: PHYSICIAN ASSISTANT

## 2025-06-17 RX ORDER — RIMEGEPANT SULFATE 75 MG/75MG
75 TABLET, ORALLY DISINTEGRATING ORAL AS NEEDED
Qty: 16 TABLET | Refills: 6 | Status: SHIPPED | OUTPATIENT
Start: 2025-06-17

## 2025-06-17 RX ORDER — METRONIDAZOLE TOPICAL 7.5 MG/G
GEL TOPICAL
COMMUNITY
Start: 2025-03-01

## 2025-06-17 RX ORDER — CHOLECALCIFEROL (VITAMIN D3) 50 MCG
2000 TABLET ORAL
COMMUNITY
Start: 2024-07-17

## 2025-06-17 RX ORDER — DEXTROAMPHETAMINE SACCHARATE, AMPHETAMINE ASPARTATE, DEXTROAMPHETAMINE SULFATE AND AMPHETAMINE SULFATE 2.5; 2.5; 2.5; 2.5 MG/1; MG/1; MG/1; MG/1
10 TABLET ORAL DAILY
COMMUNITY
Start: 2024-11-07

## 2025-06-17 RX ORDER — METRONIDAZOLE 7.5 MG/G
GEL VAGINAL
COMMUNITY

## 2025-06-17 RX ORDER — CALCIUM CARBONATE/VITAMIN D3 600 MG-10
1200 TABLET ORAL DAILY
COMMUNITY
Start: 2025-01-31

## 2025-06-17 RX ORDER — DEXTROAMPHETAMINE SACCHARATE, AMPHETAMINE ASPARTATE, DEXTROAMPHETAMINE SULFATE AND AMPHETAMINE SULFATE 1.25; 1.25; 1.25; 1.25 MG/1; MG/1; MG/1; MG/1
5 TABLET ORAL DAILY
COMMUNITY
Start: 2024-08-01

## 2025-06-17 NOTE — ASSESSMENT & PLAN NOTE
Discussed using Benadryl 25 to 50 mg with her rescue medicines.  She may also use without the headache medicine if the vertigo continues if this fails she will call for additional options
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
## 2025-06-17 NOTE — TELEPHONE ENCOUNTER
[facility] for patient to scheduled her one year follow-up with Katy Voss PA-C.

## 2025-06-17 NOTE — ASSESSMENT & PLAN NOTE
Depression Screening Follow-up Plan: Patient's depression screening was positive with a PHQ-2 score of 6. Their PHQ-9 score was 20. Continue regular follow-up with their psychologist/therapist/psychiatrist who is managing their mental health condition(s).

## 2025-06-17 NOTE — ASSESSMENT & PLAN NOTE
Preventative:  Continue medications per other providers  Cyclobenzaprine 10 mg at bedtime  Botox every 12 weeks     Abortive:  At onset of migraine take Nurtec 75 mg.  Limit of 1 in 24 hours along with ibuprofen 800 mg.  May use rizatriptan if this fails.  May repeat in 2 hours if needed.  Less than 3 doses a week or 9 a month.  May use an extra dose of cyclobenzaprine 10 mg as needed but only 10 times in a month maximum
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
## 2025-06-19 PROBLEM — R07.89 ATYPICAL CHEST PAIN: Status: ACTIVE | Noted: 2025-06-19

## 2025-06-23 ENCOUNTER — TELEPHONE (OUTPATIENT)
Dept: OTHER | Facility: OTHER | Age: 30
End: 2025-06-23

## 2025-06-23 NOTE — TELEPHONE ENCOUNTER
Patient is calling regarding cancelling an appointment.    Date/Time: 6/23/2025 / 7:00 am     Patient was rescheduled: YES [] NO [x]    Patient requesting call back to reschedule: YES [x] NO []

## 2025-06-24 ENCOUNTER — TELEPHONE (OUTPATIENT)
Dept: NEUROLOGY | Facility: CLINIC | Age: 30
End: 2025-06-24

## 2025-06-24 ENCOUNTER — TELEPHONE (OUTPATIENT)
Dept: CARDIOLOGY CLINIC | Facility: CLINIC | Age: 30
End: 2025-06-24

## 2025-06-24 NOTE — TELEPHONE ENCOUNTER
Called patient to reschedule appointment with Elizabeth Castillo cancelled via [employer]. Patient prefers to schedule with Dr. Sharma. Appointment has been scheduled 2/4/26, waitlisted for sooner appointment.

## 2025-06-24 NOTE — TELEPHONE ENCOUNTER
Reason for call:   [x] Prior Auth  [] Other:     Caller:  [] Patient  [x] Pharmacy  Name:   Address:   Callback Number:       Ordering Provider:   [] PCP/Provider -   [x] Speciality/Provider - [facility]     Has the patient tried other medications and failed? If failed, which medications did they fail?    [] No   [] Yes -     Is the patient's insurance updated in EPIC?   [] Yes   [] No     Is a copy of the patient's insurance scanned in EPIC?   [] Yes   [] No

## 2025-06-25 NOTE — TELEPHONE ENCOUNTER
Prior auth for [facility] started through [employer], [employer] [id number]. Awaiting next steps/clinical questions.

## 2025-06-27 NOTE — TELEPHONE ENCOUNTER
Nurtec is approved through 6/26/2026.Called [facility] and left a message making the pharmacy aware of the approval. Sent pt a message through [employer].

## 2025-06-30 ENCOUNTER — TELEPHONE (OUTPATIENT)
Age: 30
End: 2025-06-30

## 2025-06-30 NOTE — TELEPHONE ENCOUNTER
Verito called from [employer] to get clarification of where did the pts Botox come from,  Stock or delivered?    Service Date 5/19/25    Reference # [id number]       Verito CB # , direct line and secure VM, so may leave a msg with pt info with reference number above and update per above.

## 2025-07-07 ENCOUNTER — TELEPHONE (OUTPATIENT)
Age: 30
End: 2025-07-07

## 2025-07-07 NOTE — TELEPHONE ENCOUNTER
Pharmacy called to advise pt's botox PA  in  and they would like a fax confirmation from our office once PA has been approved.       fax#[phone]

## 2025-07-08 ENCOUNTER — NURSE TRIAGE (OUTPATIENT)
Age: 30
End: 2025-07-08

## 2025-07-08 NOTE — TELEPHONE ENCOUNTER
Regarding: New/Worsening Symptoms  ----- Message from Hollie POLLOCK sent at 7/8/2025  4:53 PM EDT -----  Patient called in today to report newer symptoms and to report she has autonomic dysfunction diagnosed by another doctor/    Symptoms are:  Numbness and Tingling in face arms and legs in left side. , constriction in legs specifically back of calf, confusion, loss of words. pounding and tighness in chest, and blood pressure dropping, pain in stomach, and urgency in urination    Established with Katy Voss and Dr Aden    asks to reach out in about an hour because she is going in for MRI at the moment, but if needed to reach out tomorrow it would be ok after 2pm after class    Thank you

## 2025-07-08 NOTE — TELEPHONE ENCOUNTER
REASON FOR CONVERSATION: CVA/TIA-like Symptoms    SYMPTOMS: Numbness and Tingling in face arms and legs in left side, constriction in legs specifically back of calf, confusion, loss of words. pounding and tightness in chest, and blood pressure dropping, pain in stomach, and urgency in urination  (from earlier message below.)    OTHER HEALTH INFORMATION:   First attempt: Called and left Voicemail at 4:57 pm  Second Attempt: Called and left Voicemail at 5:13 pm    PROTOCOL DISPOSITION: No Contact Call    CARE ADVICE PROVIDED: Called and left two messages along with a MyChart message advising patient to go to the ED immediately for evaluation.    PRACTICE FOLLOW-UP: Katy, patient asked we call back after her MRI or tomorrow if she cannot be reached today.     Reason for Disposition   No answer.  First attempt to contact caller.  Follow-up call scheduled within 15 minutes.   Second attempt to contact caller AND no contact made. Phone number verified.    Protocols used: No Contact or Duplicate Contact Call-Adult-[place]

## 2025-07-10 NOTE — TELEPHONE ENCOUNTER
Verito calling back  from [employer] to get clarification of where did the pts Botox come from,  Stock or delivered?     Service Date 5/19/25     Reference # [id number]         Verito  # [phone], direct line and secure VM, so may leave a msg with pt info with reference number above and update per above.

## 2025-07-11 NOTE — TELEPHONE ENCOUNTER
Received call back from Verito. I notified her that patient's PA approval is for buy and bill.     Chloe Kulkarni/taty  07/11/25  1:06 PM

## 2025-07-11 NOTE — TELEPHONE ENCOUNTER
Left a voicemail for Verito with my direct teams phone number and requested that she calls me back when she has time.     Chloe Kulkarni/taty  07/11/25  11:25 AM

## 2025-07-29 DIAGNOSIS — G43.009 MIGRAINE WITHOUT AURA AND WITHOUT STATUS MIGRAINOSUS, NOT INTRACTABLE: Primary | ICD-10-CM

## 2025-07-29 DIAGNOSIS — G43.709 CHRONIC MIGRAINE WITHOUT AURA WITHOUT STATUS MIGRAINOSUS, NOT INTRACTABLE: ICD-10-CM

## 2025-07-29 RX ORDER — ONABOTULINUMTOXINA 200 [USP'U]/1
200 INJECTION, POWDER, LYOPHILIZED, FOR SOLUTION INTRADERMAL; INTRAMUSCULAR
Qty: 1 EACH | Refills: 3 | Status: SHIPPED | OUTPATIENT
Start: 2025-07-29

## 2025-08-19 ENCOUNTER — TELEPHONE (OUTPATIENT)
Dept: NEUROLOGY | Facility: CLINIC | Age: 30
End: 2025-08-19